# Patient Record
Sex: FEMALE | Race: WHITE | NOT HISPANIC OR LATINO | Employment: OTHER | URBAN - METROPOLITAN AREA
[De-identification: names, ages, dates, MRNs, and addresses within clinical notes are randomized per-mention and may not be internally consistent; named-entity substitution may affect disease eponyms.]

---

## 2019-04-02 ENCOUNTER — ANESTHESIA EVENT (OUTPATIENT)
Dept: SURGERY | Facility: CLINIC | Age: 54
End: 2019-04-02
Payer: COMMERCIAL

## 2019-04-03 RX ORDER — LEFLUNOMIDE 20 MG/1
20 TABLET ORAL EVERY EVENING
COMMUNITY

## 2019-04-03 RX ORDER — CHLORAL HYDRATE 500 MG
1 CAPSULE ORAL DAILY
Status: ON HOLD | COMMUNITY
End: 2021-04-13

## 2019-04-03 RX ORDER — MODAFINIL 200 MG/1
200 TABLET ORAL DAILY PRN
COMMUNITY
End: 2022-11-18

## 2019-04-03 RX ORDER — BUPROPION HYDROCHLORIDE 150 MG/1
150 TABLET ORAL EVERY MORNING
Status: ON HOLD | COMMUNITY
End: 2021-04-13

## 2019-04-03 RX ORDER — DULOXETIN HYDROCHLORIDE 60 MG/1
120 CAPSULE, DELAYED RELEASE ORAL EVERY EVENING
Status: ON HOLD | COMMUNITY
End: 2021-04-13

## 2019-04-03 RX ORDER — METFORMIN HCL 500 MG
500 TABLET, EXTENDED RELEASE 24 HR ORAL
COMMUNITY
End: 2022-11-18

## 2019-04-03 RX ORDER — ALBUTEROL SULFATE 90 UG/1
2 AEROSOL, METERED RESPIRATORY (INHALATION) EVERY 4 HOURS PRN
COMMUNITY

## 2019-04-03 RX ORDER — BUSPIRONE HYDROCHLORIDE 5 MG/1
5 TABLET ORAL DAILY PRN
Status: ON HOLD | COMMUNITY
End: 2021-04-13

## 2019-04-03 RX ORDER — ERGOCALCIFEROL 1.25 MG/1
50000 CAPSULE, LIQUID FILLED ORAL
COMMUNITY

## 2019-04-03 RX ORDER — NYSTATIN 100000 U/G
CREAM TOPICAL 2 TIMES DAILY PRN
Status: ON HOLD | COMMUNITY
End: 2022-11-30

## 2019-04-03 RX ORDER — TOPIRAMATE 50 MG/1
50 TABLET, FILM COATED ORAL 2 TIMES DAILY
COMMUNITY
End: 2021-06-25

## 2019-04-03 RX ORDER — MORPHINE SULFATE 30 MG/1
5 TABLET ORAL 4 TIMES DAILY PRN
Status: ON HOLD | COMMUNITY
End: 2019-05-09

## 2019-04-03 RX ORDER — HYDROXYZINE PAMOATE 25 MG/1
25 CAPSULE ORAL DAILY
Status: ON HOLD | COMMUNITY
End: 2019-04-04

## 2019-04-03 RX ORDER — DILTIAZEM HCL 60 MG
60 TABLET ORAL 3 TIMES DAILY
COMMUNITY

## 2019-04-03 RX ORDER — PHENTERMINE HYDROCHLORIDE 37.5 MG/1
18.75 TABLET ORAL DAILY
COMMUNITY
End: 2021-05-13

## 2019-04-03 RX ORDER — LIDOCAINE 50 MG/G
PATCH TOPICAL EVERY 12 HOURS
Status: ON HOLD | COMMUNITY
End: 2019-04-04

## 2019-04-03 RX ORDER — NYSTATIN 100000 [USP'U]/G
POWDER TOPICAL 4 TIMES DAILY PRN
COMMUNITY

## 2019-04-03 RX ORDER — METHYLPREDNISOLONE 4 MG/1
8 TABLET ORAL DAILY
COMMUNITY
End: 2022-11-18

## 2019-04-03 RX ORDER — GABAPENTIN 300 MG/1
900 CAPSULE ORAL 3 TIMES DAILY
COMMUNITY

## 2019-04-03 RX ORDER — TRAZODONE HYDROCHLORIDE 100 MG/1
100 TABLET ORAL AT BEDTIME
COMMUNITY
End: 2022-11-18

## 2019-04-03 RX ORDER — OXYCODONE HYDROCHLORIDE 15 MG/1
15 TABLET ORAL 3 TIMES DAILY PRN
Status: ON HOLD | COMMUNITY
End: 2019-04-04

## 2019-04-03 RX ORDER — BUPROPION HYDROCHLORIDE 300 MG/1
300 TABLET ORAL EVERY MORNING
COMMUNITY

## 2019-04-03 RX ORDER — VARENICLINE TARTRATE 1 MG/1
1 TABLET, FILM COATED ORAL 2 TIMES DAILY
COMMUNITY
End: 2022-11-18

## 2019-04-03 RX ORDER — CYCLOSPORINE 0.5 MG/ML
1 EMULSION OPHTHALMIC 2 TIMES DAILY
COMMUNITY

## 2019-04-03 RX ORDER — MULTIVITAMIN,THERAPEUTIC
1 TABLET ORAL DAILY
COMMUNITY

## 2019-04-03 RX ORDER — LEVOTHYROXINE SODIUM 75 UG/1
75 TABLET ORAL DAILY
COMMUNITY
End: 2022-11-18

## 2019-04-03 RX ORDER — FLUTICASONE PROPIONATE 50 MCG
2 SPRAY, SUSPENSION (ML) NASAL DAILY
COMMUNITY

## 2019-04-03 RX ORDER — MIRABEGRON 25 MG/1
25 TABLET, FILM COATED, EXTENDED RELEASE ORAL EVERY EVENING
Status: ON HOLD | COMMUNITY
End: 2021-04-13

## 2019-04-03 RX ORDER — CYCLOBENZAPRINE HCL 10 MG
10 TABLET ORAL 3 TIMES DAILY
Status: ON HOLD | COMMUNITY
End: 2021-04-13

## 2019-04-03 RX ORDER — LIDOCAINE 40 MG/G
CREAM TOPICAL DAILY PRN
Status: ON HOLD | COMMUNITY
End: 2021-04-13

## 2019-04-03 SDOH — HEALTH STABILITY: MENTAL HEALTH: HOW OFTEN DO YOU HAVE A DRINK CONTAINING ALCOHOL?: NEVER

## 2019-04-03 NOTE — OR NURSING
An undated MRSA positive culture noted on H&P on 4/3/19. Infectious disease called. ID to notify SD pre-op if negative results found otherwise ID to enter MRSA under infection section and SD pre-op to follow MRSA precautions for patient's surgery on 4/4/19.

## 2019-04-04 ENCOUNTER — ANESTHESIA (OUTPATIENT)
Dept: SURGERY | Facility: CLINIC | Age: 54
End: 2019-04-04
Payer: COMMERCIAL

## 2019-04-04 ENCOUNTER — APPOINTMENT (OUTPATIENT)
Dept: GENERAL RADIOLOGY | Facility: CLINIC | Age: 54
End: 2019-04-04
Attending: ANESTHESIOLOGY
Payer: COMMERCIAL

## 2019-04-04 ENCOUNTER — HOSPITAL ENCOUNTER (OUTPATIENT)
Facility: CLINIC | Age: 54
Discharge: HOME | End: 2019-04-04
Attending: ANESTHESIOLOGY | Admitting: ANESTHESIOLOGY
Payer: COMMERCIAL

## 2019-04-04 VITALS
OXYGEN SATURATION: 94 % | TEMPERATURE: 98.1 F | HEART RATE: 79 BPM | WEIGHT: 287.5 LBS | SYSTOLIC BLOOD PRESSURE: 121 MMHG | DIASTOLIC BLOOD PRESSURE: 81 MMHG | HEIGHT: 63 IN | BODY MASS INDEX: 50.94 KG/M2 | RESPIRATION RATE: 12 BRPM

## 2019-04-04 LAB — GLUCOSE BLDC GLUCOMTR-MCNC: 99 MG/DL (ref 70–99)

## 2019-04-04 PROCEDURE — 71000027 ZZH RECOVERY PHASE 2 EACH 15 MINS: Performed by: ANESTHESIOLOGY

## 2019-04-04 PROCEDURE — C1897 LEAD, NEUROSTIM TEST KIT: HCPCS | Performed by: ANESTHESIOLOGY

## 2019-04-04 PROCEDURE — 71000012 ZZH RECOVERY PHASE 1 LEVEL 1 FIRST HR: Performed by: ANESTHESIOLOGY

## 2019-04-04 PROCEDURE — 37000009 ZZH ANESTHESIA TECHNICAL FEE, EACH ADDTL 15 MIN: Performed by: ANESTHESIOLOGY

## 2019-04-04 PROCEDURE — 37000008 ZZH ANESTHESIA TECHNICAL FEE, 1ST 30 MIN: Performed by: ANESTHESIOLOGY

## 2019-04-04 PROCEDURE — 25000128 H RX IP 250 OP 636: Performed by: ANESTHESIOLOGY

## 2019-04-04 PROCEDURE — 82962 GLUCOSE BLOOD TEST: CPT

## 2019-04-04 PROCEDURE — 27210794 ZZH OR GENERAL SUPPLY STERILE: Performed by: ANESTHESIOLOGY

## 2019-04-04 PROCEDURE — 25800030 ZZH RX IP 258 OP 636: Performed by: NURSE ANESTHETIST, CERTIFIED REGISTERED

## 2019-04-04 PROCEDURE — 27211024 ZZHC OR SUPPLY OTHER OPNP: Performed by: ANESTHESIOLOGY

## 2019-04-04 PROCEDURE — 25000125 ZZHC RX 250: Performed by: NURSE ANESTHETIST, CERTIFIED REGISTERED

## 2019-04-04 PROCEDURE — 25000125 ZZHC RX 250: Performed by: ANESTHESIOLOGY

## 2019-04-04 PROCEDURE — 71000013 ZZH RECOVERY PHASE 1 LEVEL 1 EA ADDTL HR: Performed by: ANESTHESIOLOGY

## 2019-04-04 PROCEDURE — 36000063 ZZH SURGERY LEVEL 4 EA 15 ADDTL MIN: Performed by: ANESTHESIOLOGY

## 2019-04-04 PROCEDURE — 40000277 XR SURGERY CARM FLUORO LESS THAN 5 MIN W STILLS

## 2019-04-04 PROCEDURE — 25000128 H RX IP 250 OP 636: Performed by: NURSE ANESTHETIST, CERTIFIED REGISTERED

## 2019-04-04 PROCEDURE — 40000169 ZZH STATISTIC PRE-PROCEDURE ASSESSMENT I: Performed by: ANESTHESIOLOGY

## 2019-04-04 PROCEDURE — 36000065 ZZH SURGERY LEVEL 4 W FLUORO 1ST 30 MIN: Performed by: ANESTHESIOLOGY

## 2019-04-04 DEVICE — LEAD KIT VECTRIS TRIAL COMPACT SURESCAN 1X8MMX90CM 977D260: Type: IMPLANTABLE DEVICE | Site: SPINE LUMBAR | Status: FUNCTIONAL

## 2019-04-04 RX ORDER — ONDANSETRON 2 MG/ML
4 INJECTION INTRAMUSCULAR; INTRAVENOUS EVERY 30 MIN PRN
Status: DISCONTINUED | OUTPATIENT
Start: 2019-04-04 | End: 2019-04-04 | Stop reason: HOSPADM

## 2019-04-04 RX ORDER — BUPIVACAINE HYDROCHLORIDE 2.5 MG/ML
INJECTION, SOLUTION EPIDURAL; INFILTRATION; INTRACAUDAL
Status: DISCONTINUED
Start: 2019-04-04 | End: 2019-04-04 | Stop reason: HOSPADM

## 2019-04-04 RX ORDER — ONDANSETRON 2 MG/ML
INJECTION INTRAMUSCULAR; INTRAVENOUS PRN
Status: DISCONTINUED | OUTPATIENT
Start: 2019-04-04 | End: 2019-04-04

## 2019-04-04 RX ORDER — FENTANYL CITRATE 50 UG/ML
25-50 INJECTION, SOLUTION INTRAMUSCULAR; INTRAVENOUS
Status: DISCONTINUED | OUTPATIENT
Start: 2019-04-04 | End: 2019-04-04 | Stop reason: HOSPADM

## 2019-04-04 RX ORDER — LABETALOL HYDROCHLORIDE 5 MG/ML
10 INJECTION, SOLUTION INTRAVENOUS
Status: DISCONTINUED | OUTPATIENT
Start: 2019-04-04 | End: 2019-04-04 | Stop reason: HOSPADM

## 2019-04-04 RX ORDER — CLINDAMYCIN PHOSPHATE 900 MG/50ML
900 INJECTION, SOLUTION INTRAVENOUS
Status: COMPLETED | OUTPATIENT
Start: 2019-04-04 | End: 2019-04-04

## 2019-04-04 RX ORDER — DEXTROSE MONOHYDRATE 25 G/50ML
25-50 INJECTION, SOLUTION INTRAVENOUS
Status: DISCONTINUED | OUTPATIENT
Start: 2019-04-04 | End: 2019-04-04 | Stop reason: HOSPADM

## 2019-04-04 RX ORDER — HYDRALAZINE HYDROCHLORIDE 20 MG/ML
2.5-5 INJECTION INTRAMUSCULAR; INTRAVENOUS EVERY 10 MIN PRN
Status: DISCONTINUED | OUTPATIENT
Start: 2019-04-04 | End: 2019-04-04 | Stop reason: HOSPADM

## 2019-04-04 RX ORDER — DEXAMETHASONE SODIUM PHOSPHATE 4 MG/ML
4 INJECTION, SOLUTION INTRA-ARTICULAR; INTRALESIONAL; INTRAMUSCULAR; INTRAVENOUS; SOFT TISSUE EVERY 10 MIN PRN
Status: DISCONTINUED | OUTPATIENT
Start: 2019-04-04 | End: 2019-04-04 | Stop reason: HOSPADM

## 2019-04-04 RX ORDER — FENTANYL CITRATE 50 UG/ML
INJECTION, SOLUTION INTRAMUSCULAR; INTRAVENOUS PRN
Status: DISCONTINUED | OUTPATIENT
Start: 2019-04-04 | End: 2019-04-04

## 2019-04-04 RX ORDER — ONDANSETRON 4 MG/1
4 TABLET, ORALLY DISINTEGRATING ORAL EVERY 30 MIN PRN
Status: DISCONTINUED | OUTPATIENT
Start: 2019-04-04 | End: 2019-04-04 | Stop reason: HOSPADM

## 2019-04-04 RX ORDER — OXYCODONE HYDROCHLORIDE 15 MG/1
15 TABLET, FILM COATED, EXTENDED RELEASE ORAL 3 TIMES DAILY
COMMUNITY
End: 2021-05-13

## 2019-04-04 RX ORDER — KETOROLAC TROMETHAMINE 30 MG/ML
30 INJECTION, SOLUTION INTRAMUSCULAR; INTRAVENOUS EVERY 6 HOURS PRN
Status: DISCONTINUED | OUTPATIENT
Start: 2019-04-04 | End: 2019-04-04 | Stop reason: HOSPADM

## 2019-04-04 RX ORDER — BUPIVACAINE HYDROCHLORIDE 2.5 MG/ML
INJECTION, SOLUTION INFILTRATION; PERINEURAL PRN
Status: DISCONTINUED | OUTPATIENT
Start: 2019-04-04 | End: 2019-04-04 | Stop reason: HOSPADM

## 2019-04-04 RX ORDER — HYDROMORPHONE HYDROCHLORIDE 1 MG/ML
.3-.5 INJECTION, SOLUTION INTRAMUSCULAR; INTRAVENOUS; SUBCUTANEOUS EVERY 10 MIN PRN
Status: DISCONTINUED | OUTPATIENT
Start: 2019-04-04 | End: 2019-04-04 | Stop reason: HOSPADM

## 2019-04-04 RX ORDER — SODIUM CHLORIDE, SODIUM LACTATE, POTASSIUM CHLORIDE, CALCIUM CHLORIDE 600; 310; 30; 20 MG/100ML; MG/100ML; MG/100ML; MG/100ML
INJECTION, SOLUTION INTRAVENOUS CONTINUOUS PRN
Status: DISCONTINUED | OUTPATIENT
Start: 2019-04-04 | End: 2019-04-04

## 2019-04-04 RX ORDER — PROPOFOL 10 MG/ML
INJECTION, EMULSION INTRAVENOUS CONTINUOUS PRN
Status: DISCONTINUED | OUTPATIENT
Start: 2019-04-04 | End: 2019-04-04

## 2019-04-04 RX ORDER — LIDOCAINE 40 MG/G
CREAM TOPICAL
Status: DISCONTINUED | OUTPATIENT
Start: 2019-04-04 | End: 2019-04-04 | Stop reason: HOSPADM

## 2019-04-04 RX ORDER — NALOXONE HYDROCHLORIDE 0.4 MG/ML
.1-.4 INJECTION, SOLUTION INTRAMUSCULAR; INTRAVENOUS; SUBCUTANEOUS
Status: DISCONTINUED | OUTPATIENT
Start: 2019-04-04 | End: 2019-04-04 | Stop reason: HOSPADM

## 2019-04-04 RX ORDER — NICOTINE POLACRILEX 4 MG
15-30 LOZENGE BUCCAL
Status: DISCONTINUED | OUTPATIENT
Start: 2019-04-04 | End: 2019-04-04 | Stop reason: HOSPADM

## 2019-04-04 RX ORDER — DEXAMETHASONE SODIUM PHOSPHATE 4 MG/ML
INJECTION, SOLUTION INTRA-ARTICULAR; INTRALESIONAL; INTRAMUSCULAR; INTRAVENOUS; SOFT TISSUE PRN
Status: DISCONTINUED | OUTPATIENT
Start: 2019-04-04 | End: 2019-04-04

## 2019-04-04 RX ORDER — SODIUM CHLORIDE, SODIUM LACTATE, POTASSIUM CHLORIDE, CALCIUM CHLORIDE 600; 310; 30; 20 MG/100ML; MG/100ML; MG/100ML; MG/100ML
INJECTION, SOLUTION INTRAVENOUS CONTINUOUS
Status: DISCONTINUED | OUTPATIENT
Start: 2019-04-04 | End: 2019-04-04 | Stop reason: HOSPADM

## 2019-04-04 RX ORDER — LIDOCAINE HYDROCHLORIDE 20 MG/ML
INJECTION, SOLUTION INFILTRATION; PERINEURAL PRN
Status: DISCONTINUED | OUTPATIENT
Start: 2019-04-04 | End: 2019-04-04

## 2019-04-04 RX ORDER — MAGNESIUM HYDROXIDE 1200 MG/15ML
LIQUID ORAL PRN
Status: DISCONTINUED | OUTPATIENT
Start: 2019-04-04 | End: 2019-04-04 | Stop reason: HOSPADM

## 2019-04-04 RX ORDER — MEPERIDINE HYDROCHLORIDE 25 MG/ML
12.5 INJECTION INTRAMUSCULAR; INTRAVENOUS; SUBCUTANEOUS
Status: DISCONTINUED | OUTPATIENT
Start: 2019-04-04 | End: 2019-04-04 | Stop reason: HOSPADM

## 2019-04-04 RX ADMIN — DEXMEDETOMIDINE HYDROCHLORIDE 8 MCG: 100 INJECTION, SOLUTION INTRAVENOUS at 07:47

## 2019-04-04 RX ADMIN — FENTANYL CITRATE 50 MCG: 50 INJECTION, SOLUTION INTRAMUSCULAR; INTRAVENOUS at 07:44

## 2019-04-04 RX ADMIN — MIDAZOLAM 2 MG: 1 INJECTION INTRAMUSCULAR; INTRAVENOUS at 07:37

## 2019-04-04 RX ADMIN — SODIUM CHLORIDE, POTASSIUM CHLORIDE, SODIUM LACTATE AND CALCIUM CHLORIDE: 600; 310; 30; 20 INJECTION, SOLUTION INTRAVENOUS at 07:37

## 2019-04-04 RX ADMIN — ONDANSETRON 4 MG: 2 INJECTION INTRAMUSCULAR; INTRAVENOUS at 08:20

## 2019-04-04 RX ADMIN — DEXAMETHASONE SODIUM PHOSPHATE 4 MG: 4 INJECTION, SOLUTION INTRA-ARTICULAR; INTRALESIONAL; INTRAMUSCULAR; INTRAVENOUS; SOFT TISSUE at 07:44

## 2019-04-04 RX ADMIN — PROPOFOL 100 MCG/KG/MIN: 10 INJECTION, EMULSION INTRAVENOUS at 07:44

## 2019-04-04 RX ADMIN — CLINDAMYCIN PHOSPHATE 900 MG: 18 INJECTION, SOLUTION INTRAVENOUS at 07:44

## 2019-04-04 RX ADMIN — LIDOCAINE HYDROCHLORIDE 40 MG: 20 INJECTION, SOLUTION INFILTRATION; PERINEURAL at 07:44

## 2019-04-04 ASSESSMENT — COPD QUESTIONNAIRES: COPD: 1

## 2019-04-04 ASSESSMENT — MIFFLIN-ST. JEOR: SCORE: 1878.22

## 2019-04-04 NOTE — PROGRESS NOTES
Admission medication history interview status for the 4/4/2019  admission is complete. See EPIC admission navigator for prior to admission medications     Medication history source reliability:Good    Medication history interview source(s):Patient    Medication history resources (including written lists, pill bottles, clinic record):None    Primary pharmacy.[facility]    Additional medication history information not noted on PTA med list :None    Time spent in this activity: 45 minutes    Prior to Admission medications    Medication Sig Last Dose Taking? Auth Provider   albuterol (PROAIR HFA/PROVENTIL HFA/VENTOLIN HFA) 108 (90 Base) MCG/ACT inhaler Inhale 2-4 puffs into the lungs every 4 hours as needed for shortness of breath / dyspnea or wheezing more than a week at prn Yes Reported, Patient   Artificial Tear Solution (SOOTHE HYDRATION OP) Apply 1 drop to eye daily 4/3/2019 at Unknown time Yes Reported, Patient   buPROPion (WELLBUTRIN XL) 150 MG 24 hr tablet Take 150 mg by mouth every morning (Takes with 300mg tablet for total dose of 450mg) 4/3/2019 at am Yes Reported, Patient   buPROPion (WELLBUTRIN XL) 300 MG 24 hr tablet Take 300 mg by mouth every morning (Take with 150 mg for a total of 450 mg daily) 4/3/2019 at am Yes Reported, Patient   busPIRone (BUSPAR) 5 MG tablet Take 5 mg by mouth 3 times daily as needed  more than a week at prn Yes Reported, Patient   cyclobenzaprine (FLEXERIL) 10 MG tablet Take 10 mg by mouth 3 times daily  4/4/2019 at 0500 Yes Reported, Patient   cycloSPORINE (RESTASIS) 0.05 % ophthalmic emulsion Place 1 drop into both eyes 2 times daily 4/4/2019 at 0500 Yes Reported, Patient   diltiazem (CARDIZEM) 60 MG tablet Take 60 mg by mouth 3 times daily 4/4/2019 at 0500 Yes Reported, Patient   DULoxetine (CYMBALTA) 60 MG capsule Take 120 mg by mouth every evening (takes 2 x 60mg = 120mg daily) 4/3/2019 at pm Yes Reported, Patient   fish oil-omega-3 fatty acids 1000  MG capsule Take 1 g by mouth daily 3/21/2019 Yes Reported, Patient   fluticasone (FLONASE) 50 MCG/ACT nasal spray Spray 2 sprays into both nostrils daily 4/3/2019 at Unknown time Yes Reported, Patient   Fluticasone-Salmeterol (AIRDUO RESPICLICK 113/14 IN) Inhale 1 puff into the lungs daily  4/3/2019 at Unknown time Yes Reported, Patient   gabapentin (NEURONTIN) 300 MG capsule Take 900 mg by mouth 3 times daily  4/3/2019 at pm Yes Reported, Patient   leflunomide (ARAVA) 20 MG tablet Take 20 mg by mouth daily 4/3/2019 at am Yes Reported, Patient   levothyroxine (SYNTHROID/LEVOTHROID) 75 MCG tablet Take 75 mcg by mouth daily 4/3/2019 at am Yes Reported, Patient   lidocaine (LMX4) 4 % external cream Apply topically daily as needed for mild pain  4/3/2019 at am Yes Reported, Patient   metFORMIN (GLUCOPHAGE-XR) 500 MG 24 hr tablet Take 1,000 mg by mouth daily 4/3/2019 at am Yes Reported, Patient   methylPREDNISolone (MEDROL) 4 MG tablet Take 4 mg by mouth daily 4/3/2019 at am Yes Reported, Patient   mirabegron (MYRBETRIQ) 25 MG 24 hr tablet Take 25 mg by mouth every evening  4/3/2019 at pm Yes Reported, Patient   modafinil (PROVIGIL) 200 MG tablet Take 200 mg by mouth every morning 4/3/2019 at am Yes Reported, Patient   morphine 5 MG solu-tab Take 5 mg by mouth 4 times daily as needed  4/4/2019 at 0500 Yes Reported, Patient   multivitamin, therapeutic (THERA-VIT) TABS tablet Take 1 tablet by mouth daily 4/3/2019 at am Yes Reported, Patient   naloxone (NARCAN) 4 MG/0.1ML nasal spray Spray 4 mg into one nostril alternating nostrils once as needed for opioid reversal every 2-3 minutes until assistance arrives unknown at prn Yes Reported, Patient   nystatin (MYCOSTATIN) 109171 UNIT/GM external cream Apply topically 3 times daily as needed for dry skin more than a week at prn Yes Reported, Patient   nystatin (MYCOSTATIN) 671328 UNIT/GM external powder Apply topically daily as needed Under abdominal wall  more than a week at  prn Yes Reported, Patient   omeprazole (PRILOSEC) 20 MG DR capsule Take 20 mg by mouth 2 times daily  4/3/2019 at pm Yes Reported, Patient   oxyCODONE (OXYCONTIN) 15 MG 12 hr tablet Take 15 mg by mouth 3 times daily 4/4/2019 at 0500 Yes Reported, Patient   phentermine (ADIPEX-P) 37.5 MG tablet Take 0.5 tablets by mouth daily 4/3/2019 at am Yes Reported, Patient   secukinumab (COSENTYX) 150 MG/ML Sensoready pen Inject 300 mg Subcutaneous every 28 days March 2019 Yes Reported, Patient   topiramate (TOPAMAX) 50 MG tablet Take 50 mg by mouth 2 times daily 4/3/2019 at pm Yes Reported, Patient   traZODone (DESYREL) 100 MG tablet Take 100 mg by mouth At Bedtime  4/2/2019 Yes Reported, Patient   varenicline (CHANTIX) 1 MG tablet Take 1 mg by mouth 2 times daily 4/3/2019 at pm Yes Reported, Patient   vitamin D2 (ERGOCALCIFEROL) 34007 units (1250 mcg) capsule Take 50,000 Units by mouth four times a week  4/3/2019 at Unknown time Yes Reported, Patient

## 2019-04-04 NOTE — ANESTHESIA CARE TRANSFER NOTE
Patient: Swathi Gibbons    Procedure(s):  SPINAL CORD STIMULATOR  TRIAL    Diagnosis: POST LAMINECTOMY SYNDROME  Diagnosis Additional Information: No value filed.    Anesthesia Type:   MAC     Note:  Airway :Nasal Cannula  Patient transferred to:PACU  Comments: At end of procedure, spontaneous respirations, patient alert to voice, able to follow commands. Oxygen via nasal cannula at 2 liters per minute to PACU. Oxygen tubing connected to wall O2 in PACU, SpO2, NiBP, and EKG monitors and alarms on and functioning, Hiwot Hugger warmer connected to patient gown, report on patient's clinical status given to PACU RN, RN questions answered.Handoff Report: Identifed the Patient, Identified the Reponsible Provider, Reviewed the pertinent medical history, Discussed the surgical course, Reviewed Intra-OP anesthesia mangement and issues during anesthesia, Set expectations for post-procedure period and Allowed opportunity for questions and acknowledgement of understanding      Vitals: (Last set prior to Anesthesia Care Transfer)    CRNA VITALS  4/4/2019 0802 - 4/4/2019 0836      4/4/2019             Resp Rate (set):  10                Electronically Signed By: JIMMIE Edwards CRNA  April 4, 2019  8:36 AM

## 2019-04-04 NOTE — OR NURSING
p's 02 sats decreased, Dr Fallon aware, pt using incentive spirometer well, pt will use cpap at home. Okay to move to stage 2.

## 2019-04-04 NOTE — DISCHARGE INSTRUCTIONS
Same Day Surgery Discharge Instructions for  Sedation and General Anesthesia       It's not unusual to feel dizzy, light-headed or faint for up to 24 hours after surgery or while taking pain medication.  If you have these symptoms: sit for a few minutes before standing and have someone assist you when you get up to walk or use the bathroom.      You should rest and relax for the next 24 hours. We recommend you make arrangements to have an adult stay with you for at least 24 hours after your discharge.  Avoid hazardous and strenuous activity.      DO NOT DRIVE any vehicle or operate mechanical equipment for 24 hours following the end of your surgery.  Even though you may feel normal, your reactions may be affected by the medication you have received.      Do not drink alcoholic beverages for 24 hours following surgery.       Slowly progress to your regular diet as you feel able. It's not unusual to feel nauseated and/or vomit after receiving anesthesia.  If you develop these symptoms, drink clear liquids (apple juice, ginger ale, broth, 7-up, etc. ) until you feel better.  If your nausea and vomiting persists for 24 hours, please notify your surgeon.        All narcotic pain medications, along with inactivity and anesthesia, can cause constipation. Drinking plenty of liquids and increasing fiber intake will help.      For any questions of a medical nature, call your surgeon.      Do not make important decisions for 24 hours.      If you had general anesthesia, you may have a sore throat for a couple of days related to the breathing tube used during surgery.  You may use Cepacol lozenges to help with this discomfort.  If it worsens or if you develop a fever, contact your surgeon.       If you feel your pain is not well managed with the pain medications prescribed by your surgeon, please contact your surgeon's office to let them know so they can address your concerns.       Spinal Cord Stimulator Trial Discharge  Instructions    After your trial procedure:      Resume light activity as tolerated    Restrict bending, lifting and twisting to reduce the chance of your leads moving.      Resume your regular pain medication regimen    Begin taking your oral antibiotic the day after your trial procedure.  It is important you finish the entire course of medication regimen    No driving with the stimulator on (Nevro brand--exempt)    A small amount of blood visible in the bandage is keily;, if any leaks out of the bandage contact [facility].    Site Care:      DO NOT remove any bandages from the trial.  A RN or your provider will assess your dressing at your mid-trial appointment and will remove the dressing at your lead pull appointment.     NO showering, baths, pools or whirlpools during the trial    Bandage needs to stay dry and intact.    After the bandage is removed, check your insertion sites daily.  Keep the area clean and dry.    Call [facility] during business hours or the on-call provider if you develop any of the following:      Signs of infection such as: fevers, chills, increased pain, drainage (as mentioned above), redness and swelling from your incision sites.    Headaches persisting for more than 48 hours.      Unusual changes in or stopping of sensation in your legs or back.      Uncontrolled pain.    Signs or symptoms of a deep vein thrombosis (DVT): swelling in one or both legs, pain or tenderness in one or both legs, warm skin on your leg, and/or red or discolored skin on your leg.    [facility]: [phone]     After hours provider: [phone]    Emergency Situations go to the Emergency Department or Call 911:      Sudden sever back pain.    Sudden onset of leg weakness and spasms.    Loss of bladder control and/or bowel function.    Signs or symptoms of a pulmonary embolism(PE): sudden coughing--which may bring up blood sharp chest pain, rapid breathing or shortness of  breath, and/or severe lightheadedness.        **Because you had anesthesia today and your history of sleep apnea, it is extremely important that you use your CPAP machine for the next 24 hours while napping or sleeping.**

## 2019-04-04 NOTE — ANESTHESIA POSTPROCEDURE EVALUATION
Patient: Swathi Gibbons    Procedure(s):  SPINAL CORD STIMULATOR  TRIAL    Diagnosis:POST LAMINECTOMY SYNDROME  Diagnosis Additional Information: No value filed.    Anesthesia Type:  MAC    Note:  Anesthesia Post Evaluation    Patient location during evaluation: PACU  Patient participation: Able to fully participate in evaluation  Level of consciousness: awake and alert  Pain management: adequate  Airway patency: patent  Cardiovascular status: acceptable  Respiratory status: acceptable and unassisted  Hydration status: acceptable  PONV: none             Last vitals:  Vitals:    04/04/19 0915 04/04/19 0930 04/04/19 1016   BP: 103/83  121/81   Pulse: 79     Resp: 12  12   Temp: 36.5  C (97.7  F)  36.7  C (98.1  F)   SpO2: 93% 94% 94%         Electronically Signed By: Gabbie Fallon MD  April 4, 2019  10:25 AM

## 2019-04-12 NOTE — OP NOTE
"  Name:    Swathi Gibbons   :    1965  Location:   [facility]  Procedure date: 2019 11:25 AM  Procedure:    [employer] Spinal Cord Stimulator Trial  Indication:  Postlaminectomy syndrome, not elsewhere classified M96.1      Allergies:  Ingredient Reaction (Severity) Medication Name Comment   MOXIFLOXACIN HCL  Avelox    SULFAMETHOXAZOLE  Septra    TRIMETHOPRIM  Septra      Lead lot numbers: [id number] and [id number].    This procedure was performed using MAC sedation due to the patient's anxiety.    Description of Procedure: IV access was obtained via a registered nurse and 1 g ANCEF mixed in 50ml of normal saline was administered through the IV over 10 minutes. During this time, a [employer] representative met with the patient to discuss where the patient's pain was located in relation to where they hoped to receive spinal cord stimulation.     The patient was brought into the procedure room and positioned prone on the fluoroscopy table with four pillows under their abdomen and prepped with Duraprep surgical solution and draped in the usual sterile fashion. The registered nurse initiated a verbal timeout or \"pause for the cause\" stating the patient's name, date of birth, and procedure to be performed. Reginald Pandey MD  then repeated back the procedure and the patient's name in confirmation.     MAC sedation was administered in increments throughout the procedure which resulted in satisfactory relaxation and sedation. Normal Saline ran through the IV over the course of the procedure. The patient was monitored throughout the procedure by a CRNA. Physiologic parameters were observed utilizing pulse oximetery and blood pressure checks every five minutes during the procedure. Monitoring revealed normal oxygen saturation and pulse rate.    The patient's skin was anesthetized at the appropriate level and a 14 gauge Tuohy needle was introduced through the skin on the left side. Fluoroscopy " was used to identify correct placement. The neurostimulator kit manufactured by [employer] was used. The needle was advanced up to the T11-T12 lamina then advanced further through the T11-T12 interlaminar space. The epidural space was penetrated as a loss of resistance was felt with an air filled syringe. The stylet was then removed and a spinal cord stimulator lead with 8 contacts was introduced. The lead was advanced without difficulty under fluoroscopy until the top of the lead was at the top T8 vertebral level with three contacts at T8. This lead was left of midline. A lateral fluoroscopy view confirmed posterior epidural placement.     A second 14 gauge Tuohy needle was introduced through the skin on the right side. Fluoroscopy was used to identify correct placement. The neurostimulator kit manufactured by [employer] was used. The needle was advanced up to the T11-T12 lamina and then advanced further through the T11-T12 interlaminar space. The epidural space was penetrated as a loss of resistance was felt with an air filled syringe. The stylet was then removed and a spinal cord stimulator lead with 8 contacts was introduced. The lead was advanced without difficulty under fluoroscopy until the top of the lead was at the top T8 vertebral level with three contacts at T8. This lead was right of midline. A lateral fluoroscopy view confirmed posterior epidural placement.     The needles were removed and the leads were secured to the skin with steri strips. The insertion sites were then covered with a Tegaderm dressing. The leads were connected to the external pulse generator.     The patient was brought back to the recovery area where they were monitored by a registered nurse. Physiologic parameters were observed utilizing pulse oximetery and blood pressure checks every ten minutes in recovery. Monitoring revealed normal oxygen saturation and pulse rate. The patient tolerated the procedure well and no immediate  complications were encountered. Further reprogramming was performed in the recovery area. The patient was shown how to use the neurostimulator controls and will follow up in our office in two days for re-evaluation. An antibiotic was prescribed as a prophylaxis and the patient will take this for the next 10 days.       Reginald Pandey MD   04/05/2019 11:25 AM

## 2019-05-08 RX ORDER — NICOTINE POLACRILEX 4 MG
15-30 LOZENGE BUCCAL
Status: CANCELLED | OUTPATIENT
Start: 2019-05-08

## 2019-05-08 RX ORDER — DEXTROSE MONOHYDRATE 25 G/50ML
25-50 INJECTION, SOLUTION INTRAVENOUS
Status: CANCELLED | OUTPATIENT
Start: 2019-05-08

## 2019-05-08 RX ORDER — CLINDAMYCIN PHOSPHATE 900 MG/50ML
900 INJECTION, SOLUTION INTRAVENOUS
Status: CANCELLED | OUTPATIENT
Start: 2019-05-08

## 2019-05-08 RX ORDER — LIDOCAINE 40 MG/G
CREAM TOPICAL
Status: CANCELLED | OUTPATIENT
Start: 2019-05-08

## 2019-05-09 ENCOUNTER — ANESTHESIA EVENT (OUTPATIENT)
Dept: SURGERY | Facility: CLINIC | Age: 54
End: 2019-05-09
Payer: COMMERCIAL

## 2019-05-09 ENCOUNTER — ANESTHESIA (OUTPATIENT)
Dept: SURGERY | Facility: CLINIC | Age: 54
End: 2019-05-09
Payer: COMMERCIAL

## 2019-05-09 ENCOUNTER — APPOINTMENT (OUTPATIENT)
Dept: GENERAL RADIOLOGY | Facility: CLINIC | Age: 54
End: 2019-05-09
Attending: ANESTHESIOLOGY
Payer: COMMERCIAL

## 2019-05-09 ENCOUNTER — HOSPITAL ENCOUNTER (OUTPATIENT)
Facility: CLINIC | Age: 54
Discharge: HOME | End: 2019-05-09
Attending: ANESTHESIOLOGY | Admitting: ANESTHESIOLOGY
Payer: COMMERCIAL

## 2019-05-09 VITALS
DIASTOLIC BLOOD PRESSURE: 82 MMHG | RESPIRATION RATE: 16 BRPM | HEART RATE: 84 BPM | TEMPERATURE: 97.5 F | WEIGHT: 278.6 LBS | SYSTOLIC BLOOD PRESSURE: 127 MMHG | BODY MASS INDEX: 49.36 KG/M2 | HEIGHT: 63 IN | OXYGEN SATURATION: 95 %

## 2019-05-09 PROCEDURE — 71000014 ZZH RECOVERY PHASE 1 LEVEL 2 FIRST HR: Performed by: ANESTHESIOLOGY

## 2019-05-09 PROCEDURE — C1787 PATIENT PROGR, NEUROSTIM: HCPCS | Performed by: ANESTHESIOLOGY

## 2019-05-09 PROCEDURE — 25800030 ZZH RX IP 258 OP 636: Performed by: NURSE ANESTHETIST, CERTIFIED REGISTERED

## 2019-05-09 PROCEDURE — 25000128 H RX IP 250 OP 636: Performed by: NURSE ANESTHETIST, CERTIFIED REGISTERED

## 2019-05-09 PROCEDURE — 36000065 ZZH SURGERY LEVEL 4 W FLUORO 1ST 30 MIN: Performed by: ANESTHESIOLOGY

## 2019-05-09 PROCEDURE — C1778 LEAD, NEUROSTIMULATOR: HCPCS | Performed by: ANESTHESIOLOGY

## 2019-05-09 PROCEDURE — 40000279 XR SURGERY CARM FLUORO GREATER THAN 5 MIN W STILLS

## 2019-05-09 PROCEDURE — 71000015 ZZH RECOVERY PHASE 1 LEVEL 2 EA ADDTL HR: Performed by: ANESTHESIOLOGY

## 2019-05-09 PROCEDURE — 36000063 ZZH SURGERY LEVEL 4 EA 15 ADDTL MIN: Performed by: ANESTHESIOLOGY

## 2019-05-09 PROCEDURE — 71000027 ZZH RECOVERY PHASE 2 EACH 15 MINS: Performed by: ANESTHESIOLOGY

## 2019-05-09 PROCEDURE — 25800030 ZZH RX IP 258 OP 636: Performed by: ANESTHESIOLOGY

## 2019-05-09 PROCEDURE — 25000132 ZZH RX MED GY IP 250 OP 250 PS 637: Performed by: ANESTHESIOLOGY

## 2019-05-09 PROCEDURE — 40000170 ZZH STATISTIC PRE-PROCEDURE ASSESSMENT II: Performed by: ANESTHESIOLOGY

## 2019-05-09 PROCEDURE — 37000008 ZZH ANESTHESIA TECHNICAL FEE, 1ST 30 MIN: Performed by: ANESTHESIOLOGY

## 2019-05-09 PROCEDURE — C1820 GENERATOR NEURO RECHG BAT SY: HCPCS | Performed by: ANESTHESIOLOGY

## 2019-05-09 PROCEDURE — 27210794 ZZH OR GENERAL SUPPLY STERILE: Performed by: ANESTHESIOLOGY

## 2019-05-09 PROCEDURE — 37000009 ZZH ANESTHESIA TECHNICAL FEE, EACH ADDTL 15 MIN: Performed by: ANESTHESIOLOGY

## 2019-05-09 PROCEDURE — 25000125 ZZHC RX 250: Performed by: NURSE ANESTHETIST, CERTIFIED REGISTERED

## 2019-05-09 PROCEDURE — 25000125 ZZHC RX 250: Performed by: ANESTHESIOLOGY

## 2019-05-09 DEVICE — IMP LEAD KIT VECTRIS SCS COMPACT SURESCAN 1X8MMX60CM 977A260: Type: IMPLANTABLE DEVICE | Site: SPINE THORACIC | Status: FUNCTIONAL

## 2019-05-09 DEVICE — GENERATOR NEUROSTIM INTELLIS MRI SURESCAN 97715: Type: IMPLANTABLE DEVICE | Site: SPINE THORACIC | Status: FUNCTIONAL

## 2019-05-09 RX ORDER — KETOROLAC TROMETHAMINE 30 MG/ML
30 INJECTION, SOLUTION INTRAMUSCULAR; INTRAVENOUS EVERY 6 HOURS PRN
Status: DISCONTINUED | OUTPATIENT
Start: 2019-05-09 | End: 2019-05-09 | Stop reason: HOSPADM

## 2019-05-09 RX ORDER — PROPOFOL 10 MG/ML
INJECTION, EMULSION INTRAVENOUS CONTINUOUS PRN
Status: DISCONTINUED | OUTPATIENT
Start: 2019-05-09 | End: 2019-05-09

## 2019-05-09 RX ORDER — LIDOCAINE HYDROCHLORIDE 20 MG/ML
INJECTION, SOLUTION INFILTRATION; PERINEURAL PRN
Status: DISCONTINUED | OUTPATIENT
Start: 2019-05-09 | End: 2019-05-09

## 2019-05-09 RX ORDER — MORPHINE SULFATE 15 MG/1
15-30 TABLET ORAL 2 TIMES DAILY PRN
COMMUNITY
End: 2021-06-25

## 2019-05-09 RX ORDER — ONDANSETRON 2 MG/ML
INJECTION INTRAMUSCULAR; INTRAVENOUS PRN
Status: DISCONTINUED | OUTPATIENT
Start: 2019-05-09 | End: 2019-05-09

## 2019-05-09 RX ORDER — MAGNESIUM HYDROXIDE 1200 MG/15ML
LIQUID ORAL PRN
Status: DISCONTINUED | OUTPATIENT
Start: 2019-05-09 | End: 2019-05-09 | Stop reason: HOSPADM

## 2019-05-09 RX ORDER — DEXAMETHASONE SODIUM PHOSPHATE 4 MG/ML
4 INJECTION, SOLUTION INTRA-ARTICULAR; INTRALESIONAL; INTRAMUSCULAR; INTRAVENOUS; SOFT TISSUE EVERY 10 MIN PRN
Status: DISCONTINUED | OUTPATIENT
Start: 2019-05-09 | End: 2019-05-09 | Stop reason: HOSPADM

## 2019-05-09 RX ORDER — HYDRALAZINE HYDROCHLORIDE 20 MG/ML
2.5-5 INJECTION INTRAMUSCULAR; INTRAVENOUS EVERY 10 MIN PRN
Status: DISCONTINUED | OUTPATIENT
Start: 2019-05-09 | End: 2019-05-09 | Stop reason: HOSPADM

## 2019-05-09 RX ORDER — FENTANYL CITRATE 50 UG/ML
INJECTION, SOLUTION INTRAMUSCULAR; INTRAVENOUS PRN
Status: DISCONTINUED | OUTPATIENT
Start: 2019-05-09 | End: 2019-05-09

## 2019-05-09 RX ORDER — MEPERIDINE HYDROCHLORIDE 25 MG/ML
12.5 INJECTION INTRAMUSCULAR; INTRAVENOUS; SUBCUTANEOUS
Status: DISCONTINUED | OUTPATIENT
Start: 2019-05-09 | End: 2019-05-09 | Stop reason: HOSPADM

## 2019-05-09 RX ORDER — HYDROMORPHONE HYDROCHLORIDE 1 MG/ML
.3-.5 INJECTION, SOLUTION INTRAMUSCULAR; INTRAVENOUS; SUBCUTANEOUS EVERY 10 MIN PRN
Status: DISCONTINUED | OUTPATIENT
Start: 2019-05-09 | End: 2019-05-09 | Stop reason: HOSPADM

## 2019-05-09 RX ORDER — KETAMINE HYDROCHLORIDE 10 MG/ML
INJECTION, SOLUTION INTRAMUSCULAR; INTRAVENOUS PRN
Status: DISCONTINUED | OUTPATIENT
Start: 2019-05-09 | End: 2019-05-09

## 2019-05-09 RX ORDER — SODIUM CHLORIDE, SODIUM LACTATE, POTASSIUM CHLORIDE, CALCIUM CHLORIDE 600; 310; 30; 20 MG/100ML; MG/100ML; MG/100ML; MG/100ML
INJECTION, SOLUTION INTRAVENOUS CONTINUOUS
Status: DISCONTINUED | OUTPATIENT
Start: 2019-05-09 | End: 2019-05-09 | Stop reason: HOSPADM

## 2019-05-09 RX ORDER — CLINDAMYCIN PHOSPHATE 900 MG/50ML
INJECTION, SOLUTION INTRAVENOUS PRN
Status: DISCONTINUED | OUTPATIENT
Start: 2019-05-09 | End: 2019-05-09

## 2019-05-09 RX ORDER — NALOXONE HYDROCHLORIDE 0.4 MG/ML
.1-.4 INJECTION, SOLUTION INTRAMUSCULAR; INTRAVENOUS; SUBCUTANEOUS
Status: DISCONTINUED | OUTPATIENT
Start: 2019-05-09 | End: 2019-05-09 | Stop reason: HOSPADM

## 2019-05-09 RX ORDER — ONDANSETRON 2 MG/ML
4 INJECTION INTRAMUSCULAR; INTRAVENOUS EVERY 30 MIN PRN
Status: DISCONTINUED | OUTPATIENT
Start: 2019-05-09 | End: 2019-05-09 | Stop reason: HOSPADM

## 2019-05-09 RX ORDER — LIDOCAINE 4 G/G
2 PATCH TOPICAL EVERY 12 HOURS PRN
COMMUNITY
End: 2022-11-18

## 2019-05-09 RX ORDER — MORPHINE SULFATE 15 MG/1
15 TABLET ORAL ONCE
Status: COMPLETED | OUTPATIENT
Start: 2019-05-09 | End: 2019-05-09

## 2019-05-09 RX ORDER — LABETALOL 20 MG/4 ML (5 MG/ML) INTRAVENOUS SYRINGE
10
Status: DISCONTINUED | OUTPATIENT
Start: 2019-05-09 | End: 2019-05-09 | Stop reason: HOSPADM

## 2019-05-09 RX ORDER — ONDANSETRON 4 MG/1
4 TABLET, ORALLY DISINTEGRATING ORAL EVERY 30 MIN PRN
Status: DISCONTINUED | OUTPATIENT
Start: 2019-05-09 | End: 2019-05-09 | Stop reason: HOSPADM

## 2019-05-09 RX ORDER — FENTANYL CITRATE 50 UG/ML
25-50 INJECTION, SOLUTION INTRAMUSCULAR; INTRAVENOUS
Status: DISCONTINUED | OUTPATIENT
Start: 2019-05-09 | End: 2019-05-09 | Stop reason: HOSPADM

## 2019-05-09 RX ADMIN — DEXMEDETOMIDINE HYDROCHLORIDE 4 MCG: 100 INJECTION, SOLUTION INTRAVENOUS at 10:03

## 2019-05-09 RX ADMIN — SODIUM CHLORIDE, POTASSIUM CHLORIDE, SODIUM LACTATE AND CALCIUM CHLORIDE: 600; 310; 30; 20 INJECTION, SOLUTION INTRAVENOUS at 09:16

## 2019-05-09 RX ADMIN — Medication 10 MG: at 09:51

## 2019-05-09 RX ADMIN — MORPHINE SULFATE 15 MG: 15 TABLET ORAL at 12:25

## 2019-05-09 RX ADMIN — SODIUM CHLORIDE, POTASSIUM CHLORIDE, SODIUM LACTATE AND CALCIUM CHLORIDE: 600; 310; 30; 20 INJECTION, SOLUTION INTRAVENOUS at 10:44

## 2019-05-09 RX ADMIN — CLINDAMYCIN PHOSPHATE 900 MG: 18 INJECTION, SOLUTION INTRAVENOUS at 09:22

## 2019-05-09 RX ADMIN — DEXMEDETOMIDINE HYDROCHLORIDE 8 MCG: 100 INJECTION, SOLUTION INTRAVENOUS at 09:33

## 2019-05-09 RX ADMIN — ONDANSETRON 4 MG: 2 INJECTION INTRAMUSCULAR; INTRAVENOUS at 10:03

## 2019-05-09 RX ADMIN — Medication 10 MG: at 10:22

## 2019-05-09 RX ADMIN — PROPOFOL 100 MCG/KG/MIN: 10 INJECTION, EMULSION INTRAVENOUS at 09:21

## 2019-05-09 RX ADMIN — FENTANYL CITRATE 50 MCG: 50 INJECTION, SOLUTION INTRAMUSCULAR; INTRAVENOUS at 09:22

## 2019-05-09 RX ADMIN — MIDAZOLAM 2 MG: 1 INJECTION INTRAMUSCULAR; INTRAVENOUS at 09:22

## 2019-05-09 RX ADMIN — LIDOCAINE HYDROCHLORIDE 40 MG: 20 INJECTION, SOLUTION INFILTRATION; PERINEURAL at 09:22

## 2019-05-09 RX ADMIN — FENTANYL CITRATE 50 MCG: 50 INJECTION, SOLUTION INTRAMUSCULAR; INTRAVENOUS at 10:19

## 2019-05-09 ASSESSMENT — MIFFLIN-ST. JEOR: SCORE: 1837.85

## 2019-05-09 ASSESSMENT — LIFESTYLE VARIABLES: TOBACCO_USE: 1

## 2019-05-09 NOTE — DISCHARGE INSTRUCTIONS
Same Day Surgery Discharge Instructions for  Sedation and General Anesthesia       It's not unusual to feel dizzy, light-headed or faint for up to 24 hours after surgery or while taking pain medication.  If you have these symptoms: sit for a few minutes before standing and have someone assist you when you get up to walk or use the bathroom.      You should rest and relax for the next 24 hours. We recommend you make arrangements to have an adult stay with you for at least 24 hours after your discharge.  Avoid hazardous and strenuous activity.      DO NOT DRIVE any vehicle or operate mechanical equipment for 24 hours following the end of your surgery.  Even though you may feel normal, your reactions may be affected by the medication you have received.      Do not drink alcoholic beverages for 24 hours following surgery.       Slowly progress to your regular diet as you feel able. It's not unusual to feel nauseated and/or vomit after receiving anesthesia.  If you develop these symptoms, drink clear liquids (apple juice, ginger ale, broth, 7-up, etc. ) until you feel better.  If your nausea and vomiting persists for 24 hours, please notify your surgeon.        All narcotic pain medications, along with inactivity and anesthesia, can cause constipation. Drinking plenty of liquids and increasing fiber intake will help.      For any questions of a medical nature, call your surgeon.      Do not make important decisions for 24 hours.      If you had general anesthesia, you may have a sore throat for a couple of days related to the breathing tube used during surgery.  You may use Cepacol lozenges to help with this discomfort.  If it worsens or if you develop a fever, contact your surgeon.       If you feel your pain is not well managed with the pain medications prescribed by your surgeon, please contact your surgeon's office to let them know so they can address your concerns.       Spinal Cord Stimulator Implant Discharge  Instruction      After Surgery:     Resume light activity as tolerated    Restrict bending, lifting and twisting to reduce the chance of your leads moving for 6-8 weeks    Resume your regular pain medication regimen    Begin taking your oral antibiotic the day after your surgery.  It is important you finish the entire course of medication until gone.    Driving is not recommended while your stimulator is turned on    A small amount of blood visible on the bandages is normal, if any leaks out of the bandage contact [facility]    Site Care:    DO NOT remove any bandages from the surgery.  A RN or your provider will remove the bandages at your follow-up appointment at [facility].    Your incisions have been closed with dissolvable stitches.  There are steri-strips covering your incision under your bandages.  Do not remove these, they will fall off on their own in 5-7 days.    After the bandages are removed, check your incision sites daily.  Keep sites clean and dry.    During the first few days, you may notice some swelling or discoloration around the incision sites, which is normal.  Some fluid (yellow to light red to orange) may ooze or leak from the incisions. However, if the fluid is foul smelling, thick, or does not decrease in amount, call [facility].    DO NOT shower until your bandages have been removed.    NO soaking your incisions for 2 weeks.  This includes baths, whirlpools, swimming pools, and hot tubs.    Call [facility] during business hours or the on-call call provider after hours if you develop any of the following:    Signs of infection such as: fevers, chills, increased pain, drainage (as mentioned above), redness, and swelling from your incision sites.    Headache persisting for more than 48 hours    Unusual changes in or stopping of sensation in your legs or back.     Uncontrolled pain.     Signs or symptoms of a deep vein thrombosis (DVT) :  swelling in one orbrboth legs, pain or tenderness in one or both legs, warm skin on your leg, and/or red or discolored skin on your leg    [facility]  [phone]    After hours on-call provider     Emergency Situations go to the Emergency Department or Call 911:    Sudden severe back pain    Sudden onset of leg weakness and spasms    Loss of bladder control and/or bowel function    Signs or symptoms or a pulmonary embolism (PE): sudden coughing --which may bring up blood, sharp chest pain, rapid breathing or shortness of breath, and/or severe light headedness         **If you have questions or concerns about your procedure,  call Dr. Pandey at [phone]**           Follow up in clinic on 5/16 at 1:20 PM

## 2019-05-09 NOTE — ANESTHESIA CARE TRANSFER NOTE
Patient: Swathi Gibbons    Procedure(s):  INSERTION, SPINAL CORD STIMULATOR, DORSAL COLUMN ([employer])    Diagnosis: POSTLAMINECTOMY SYNDROME  Diagnosis Additional Information: No value filed.    Anesthesia Type:   MAC     Note:  Airway :Nasal Cannula  Patient transferred to:PACU  Handoff Report: Identifed the Patient, Identified the Reponsible Provider, Reviewed the pertinent medical history, Discussed the surgical course, Reviewed Intra-OP anesthesia mangement and issues during anesthesia, Set expectations for post-procedure period and Allowed opportunity for questions and acknowledgement of understanding      Vitals: (Last set prior to Anesthesia Care Transfer)    CRNA VITALS  5/9/2019 1025 - 5/9/2019 1102      5/9/2019             Resp Rate (set):  10                Electronically Signed By: JIMMIE Jean-Baptiste CRNA  May 9, 2019  11:02 AM

## 2019-05-09 NOTE — OR NURSING
Printed and verbal instructions were given to the patient and assigned care taker.  Patient and caretaker verbalized understanding discharge instructions and has no further question. No knowledge deficit noted. Patient's belongings were returned to the patient and care taker. Patient was transferred to a wheel chair and was accompanied by volunteer service to the hospital entrance door to be discharged to patient's home. Per patient, [employer] to see patient during her next clinic visit.

## 2019-05-09 NOTE — ANESTHESIA POSTPROCEDURE EVALUATION
Patient: Swathi Gibbons    Procedure(s):  INSERTION, SPINAL CORD STIMULATOR, DORSAL COLUMN ([employer])    Diagnosis:POSTLAMINECTOMY SYNDROME  Diagnosis Additional Information: No value filed.    Anesthesia Type:  MAC    Note:  Anesthesia Post Evaluation    Patient location during evaluation: PACU  Patient participation: Able to fully participate in evaluation  Level of consciousness: awake and alert  Pain management: adequate  Airway patency: patent  Cardiovascular status: acceptable  Respiratory status: acceptable and unassisted  Hydration status: acceptable  PONV: none             Last vitals:  Vitals:    05/09/19 1156 05/09/19 1200 05/09/19 1215   BP: 122/80 117/87 118/85   Pulse:   87   Resp:  17 10   Temp:      SpO2:  94% 92%         Electronically Signed By: Gabbie Fallon MD  May 9, 2019  12:54 PM

## 2019-05-09 NOTE — PROGRESS NOTES
Admission medication history interview status for the 5/9/2019  admission is complete. See EPIC admission navigator for prior to admission medications     Medication history source reliability:Moderate    Medication history interview source(s):Patient    Medication history resources (including written lists, pill bottles, clinic record):CLINIC RECORD    Primary pharmacy.[facility]    Additional medication history information not noted on PTA med list :None    Time spent in this activity: 70 MINUTES    Prior to Admission medications    Medication Sig Last Dose Taking? Auth Provider   albuterol (PROAIR HFA/PROVENTIL HFA/VENTOLIN HFA) 108 (90 Base) MCG/ACT inhaler Inhale 2 puffs into the lungs 3 times daily as needed for shortness of breath / dyspnea or wheezing (VENTOLIN) MORE THAN A MONTH at PRN Yes Reported, Patient   Artificial Tear Solution (SOOTHE HYDRATION OP) Apply 1 drop to eye 4 times daily as needed  5/9/2019 at 0610 Yes Reported, Patient   buPROPion (WELLBUTRIN XL) 150 MG 24 hr tablet Take 150 mg by mouth every morning (Takes with 300mg tablet for total dose of 450mg) 5/9/2019 at 0610 Yes Reported, Patient   buPROPion (WELLBUTRIN XL) 300 MG 24 hr tablet Take 300 mg by mouth every morning (Take with 150 mg for a total of 450 mg daily) 5/9/2019 at 0610 Yes Reported, Patient   busPIRone (BUSPAR) 5 MG tablet Take 5 mg by mouth daily as needed  5/6/2019 at PRN Yes Reported, Patient   cyclobenzaprine (FLEXERIL) 10 MG tablet Take 10 mg by mouth 3 times daily  5/8/2019 at PM Yes Reported, Patient   cycloSPORINE (RESTASIS) 0.05 % ophthalmic emulsion Place 1 drop into both eyes 2 times daily 5/9/2019 at 0610 Yes Reported, Patient   diltiazem (CARDIZEM) 60 MG tablet Take 60 mg by mouth 3 times daily 5/9/2019 at 0610 Yes Reported, Patient   DULoxetine (CYMBALTA) 60 MG capsule Take 120 mg by mouth every evening (takes 2 x 60mg = 120mg daily) 5/8/2019 at PM Yes Reported, Patient   fish oil-omega-3 fatty acids 1000 MG  capsule Take 1 g by mouth daily MORE THAN 2 WEEK at AM Yes Reported, Patient   fluticasone (FLONASE) 50 MCG/ACT nasal spray Spray 1 spray into both nostrils 2 times daily  5/8/2019 at PM Yes Reported, Patient   Fluticasone-Salmeterol (AIRDUO RESPICLICK 113/14 IN) Inhale 1 puff into the lungs daily  5/9/2019 at 0610 Yes Reported, Patient   gabapentin (NEURONTIN) 300 MG capsule Take 900 mg by mouth 3 times daily (300MG X 3 = 900MG) 5/9/2019 at 0610 Yes Reported, Patient   leflunomide (ARAVA) 20 MG tablet Take 20 mg by mouth every evening  5/8/2019 at PM Yes Reported, Patient   levothyroxine (SYNTHROID/LEVOTHROID) 75 MCG tablet Take 75 mcg by mouth daily 5/9/2019 at 0610 Yes Reported, Patient   Lidocaine (LIDOCARE) 4 % Patch Place 2 patches onto the skin every 12 hours as needed for moderate pain 5/7/2019 at PRN Yes Reported, Patient   lidocaine (LMX4) 4 % external cream Apply topically daily as needed for mild pain  5/7/2019 at PRN Yes Reported, Patient   metFORMIN (GLUCOPHAGE-XR) 500 MG 24 hr tablet Take 1,000 mg by mouth daily (500MG X 2 = 1,000MG) 5/8/2019 at AM Yes Reported, Patient   methylPREDNISolone (MEDROL) 4 MG tablet Take 4 mg by mouth daily 5/9/2019 at 0610 Yes Reported, Patient   mirabegron (MYRBETRIQ) 25 MG 24 hr tablet Take 25 mg by mouth every evening  5/8/2019 at PM Yes Reported, Patient   modafinil (PROVIGIL) 200 MG tablet Take 200 mg by mouth every morning 5/8/2019 at AM Yes Reported, Patient   morphine (MSIR) 15 MG IR tablet Take 15 mg by mouth 3 times daily as needed for severe pain 5/8/2019 at NOON Yes Reported, Patient   multivitamin, therapeutic (THERA-VIT) TABS tablet Take 1 tablet by mouth daily 5/8/2019 at AM Yes Reported, Patient   naloxone (NARCAN) 4 MG/0.1ML nasal spray Spray 4 mg into one nostril alternating nostrils once as needed for opioid reversal every 2-3 minutes until assistance arrives ON HAND Yes Reported, Patient   nystatin (MYCOSTATIN) 285174 UNIT/GM external cream Apply  topically 2 times daily as needed for dry skin  MORE THAN A MONTH at PRN Yes Reported, Patient   nystatin (MYCOSTATIN) 501801 UNIT/GM external powder Apply topically 2 times daily as needed Under abdominal wall  MORE THAN A WEEK at PRN Yes Reported, Patient   omeprazole (PRILOSEC) 20 MG DR capsule Take 20 mg by mouth 2 times daily  5/8/2019 at PM Yes Reported, Patient   oxyCODONE (OXYCONTIN) 15 MG 12 hr tablet Take 15 mg by mouth 3 times daily 5/9/2019 at 0610 Yes Reported, Patient   phentermine (ADIPEX-P) 37.5 MG tablet Take 18.75 mg by mouth daily (37.5MG X 0.5 = 18.75MG) 5/8/2019 at AM Yes Reported, Patient   secukinumab (COSENTYX) 150 MG/ML Sensoready pen Inject 300 mg Subcutaneous every 28 days 6 WEEKS AGO Yes Reported, Patient   topiramate (TOPAMAX) 50 MG tablet Take 50 mg by mouth 2 times daily 5/9/2019 at 0610 Yes Reported, Patient   traZODone (DESYREL) 100 MG tablet Take 100 mg by mouth At Bedtime  5/8/2019 at PM Yes Reported, Patient   varenicline (CHANTIX) 1 MG tablet Take 1 mg by mouth 2 times daily 5/8/2019 at PM Yes Reported, Patient   vitamin D2 (ERGOCALCIFEROL) 00767 units (1250 mcg) capsule Take 50,000 Units by mouth four times a week  5/8/2019 at PM Yes Reported, Patient   Xylitol 500 MG DISK Take 1 Dose by mouth every evening as needed 5/8/2019 at PM Yes Reported, Patient

## 2019-05-09 NOTE — ANESTHESIA PREPROCEDURE EVALUATION
Anesthesia Pre-Procedure Evaluation    Patient: Swathi Gibbons   MRN: [id number] : 1965          Preoperative Diagnosis: POSTLAMINECTOMY SYNDROME    Procedure(s):  INSERTION, SPINAL CORD STIMULATOR, DORSAL COLUMN ([employer])    Past Medical History:   Diagnosis Date     Alcohol use disorder, severe, in sustained remission (H)      Anxiety and depression      Arthritis     PSORIATIC     Chronic rhinitis      Chronic rhinitis      COPD (chronic obstructive pulmonary disease) (H)      Diabetes (H)     Type II     Esophageal dysmotility      EtOH dependence (H)      Gastroesophageal reflux disease      Hypertension      Hypothyroidism      Latent tuberculosis      MRSA (methicillin resistant staph aureus) culture positive      Nodular goiter      NABOR (obstructive sleep apnea)     C-PAP     Other chronic pain     back pain     Psoriasis      Secondary hyperparathyroidism (H)      Tracheostomy in place (H)      Uncomplicated asthma      Vitamin D deficiency      Past Surgical History:   Procedure Laterality Date     BACK SURGERY      L4-5 Decompression and Fusion     BREAST SURGERY      Right breast biopsy, benign     ENT SURGERY      Tonsillectomy     EYE SURGERY      Lasik     GI SURGERY      Bladder Suspension     GYN SURGERY       Section     GYN SURGERY      Tubal Ligation     INSERT STIMULATOR DORSAL COLUMN TRIAL N/A 2019    Procedure: SPINAL CORD STIMULATOR  TRIAL;  Surgeon: Reginald Pandey MD;  Location:  OR     ORTHOPEDIC SURGERY      ORIF Ankle Left 2017     ORTHOPEDIC SURGERY      Bunionectomy [facility].       Anesthesia Evaluation     . Pt has had prior anesthetic.     No history of anesthetic complications          ROS/MED HX    ENT/Pulmonary:     (+)sleep apnea, tobacco use, Past use asthma Treatment: Inhaler daily and Inhaled steroids,  uses CPAP , . Other pulmonary disease latent TB.    Neurologic:  - neg neurologic ROS     Cardiovascular:     (+) Dyslipidemia, hypertension----. : . .  ". :. .       METS/Exercise Tolerance:     Hematologic:         Musculoskeletal:   (+) arthritis,  -       GI/Hepatic:     (+) GERD Asymptomatic on medication, Other GI/Hepatic PEPTIC ULCER      Renal/Genitourinary:  - ROS Renal section negative       Endo:     (+) type II DM Not using insulin thyroid problem hypothyroidism, Chronic steroid usage for Arthritis Obesity, .      Psychiatric:     (+) psychiatric history anxiety and depression      Infectious Disease:         Malignancy:         Other:    (+) H/O Chronic Pain,H/O chronic opiod use ,                         Physical Exam  Normal systems: cardiovascular and pulmonary    Airway   Mallampati: III  TM distance: >3 FB  Neck ROM: full    Dental   (+) other    Cardiovascular       Pulmonary             Lab Results   Component Value Date    WBC 6.5 09/14/2007    HGB 15.2 09/14/2007    HCT 43.6 09/14/2007     09/14/2007     (H) 09/14/2007    POTASSIUM 3.7 09/14/2007    CHLORIDE 105 09/14/2007    CO2 27 09/14/2007    BUN 8 09/14/2007    CR 0.61 09/14/2007    GLC 96 09/16/2007    EVELIA 9.0 09/14/2007    PHOS 3.5 09/14/2007    MAG 2.1 09/14/2007    ALBUMIN 4.4 09/14/2007    PROTTOTAL 7.8 09/14/2007    ALT 11 09/14/2007    AST 26 09/14/2007    GGT 30 09/14/2007    ALKPHOS 121 09/14/2007    BILITOTAL 0.4 09/14/2007    LIPASE 50 09/15/2007    TSH 2.73 09/14/2007       Preop Vitals  BP Readings from Last 3 Encounters:   04/04/19 121/81    Pulse Readings from Last 3 Encounters:   04/04/19 79      Resp Readings from Last 3 Encounters:   04/04/19 12    SpO2 Readings from Last 3 Encounters:   04/04/19 94%      Temp Readings from Last 1 Encounters:   04/04/19 36.7  C (98.1  F) (Temporal)    Ht Readings from Last 1 Encounters:   04/04/19 1.6 m (5' 3\")      Wt Readings from Last 1 Encounters:   04/04/19 130.4 kg (287 lb 8 oz)    Estimated body mass index is 50.93 kg/m  as calculated from the following:    Height as of 4/4/19: 1.6 m (5' 3\").    Weight as of 4/4/19: " 130.4 kg (287 lb 8 oz).       Anesthesia Plan      History & Physical Review  History and physical reviewed and following examination; no interval change.    ASA Status:  3 .    NPO Status:  > 8 hours    Plan for MAC Reason for MAC:  Deep or markedly invasive procedure (G8)  PONV prophylaxis:  Ondansetron (or other 5HT-3)       Postoperative Care  Postoperative pain management:  Multi-modal analgesia.      Consents  Anesthetic plan, risks, benefits and alternatives discussed with:  Patient..                 Gabbie Fallon MD

## 2019-05-29 NOTE — OP NOTE
Name:    Swathi Gibbons   :    1965  [facility]:   [facility]  Procedure date: 2019 09:30 AM  Procedure:    Medtronic Lumbar Spinal Cord Stimulator Implant  Indication:  Postlaminectomy syndrome, not elsewhere classified M96.1    Allergies:  Ingredient Reaction (Severity) Medication Name Comment   MOXIFLOXACIN HCL  Avelox    SULFAMETHOXAZOLE  Septra    TRIMETHOPRIM  Septra      This procedure was performed using MAC sedation due to the patient's anxiety.    Anesthesia: MAC and Local.    Implants: All implants were Medtronic products. The lead lot numbers were [id number] and [id number]. The implantable pulse generator was lot [id number] and serial number [id number].    Description of Procedure:  After discussing potential risks and benefits, the patient did wish to proceed and signed a written consent form. The pocket site was marked in the preoperative area. An antibiotic prescription was given to the patient to use for the next 10 days for prophylaxis. The patient was brought into the operating room and positioned prone on the operating table taking care relieve all pressure points and place extremities in a comfortable position. MAC anesthesia was induced. The operative field was prepped and draped in normal sterile fashion. The skin was anesthetized with Xylocaine at the appropriate level as identified fluoroscopically.  A paramedian incision was then made in the mid back. A 14 gauge Tuohy needle was then introduced under fluoroscopy. The left T12-L1 epidural space was entered as a loss of resistance was felt with an air filled syringe. The Eight contact compact spinal cord stimulator lead was advanced to the mid T8 vertebral level. This lead was left of midline.  A second Eight contact compact spinal cord stimulator lead was placed through the right T12-L1 epidural space and advanced to the mid T7 vertebral level. This lead was right of midline. Lateral and AP fluoroscopy views were  obtained and confirmed appropriate posterior epidural placement.      The anchors were placed and tightened around the leads using and then to the fascia using 2.0 silk sutures. A superficial gluteal pocket was developed on the right side.  A subcutaneous tunnel was made and the cables were pulled through to the gluteal pocket. The cable ends were connected to the IPG and all connections were secured to torque. An impedance check was performed and all impedances were within normal limits. The IPG and excess cable were then placed into the gluteal pocket with the excess cable coiled behind the IPG. The wounds were then irrigated profusely with saline solution and 1g of Vancomycin powder was dispersed between the two incision sites. The incisions were closed in layers using absorbable sutures. Steri-strips were used as well as sutures. Final X-ray revealed no significant changes in lead positions.      The patient was awakened, positioned supine and delivered to the recovery  room. Discharge post-op care instructions were reviewed with the patient, and the patient verbalized understanding and was provided with a written copy. IV was discontinued with catheter intact and patient was then discharged.     Reginald Pandey MD  05/09/2019 09:30 AM

## 2021-01-31 NOTE — ANESTHESIA PREPROCEDURE EVALUATION
Anesthesia Pre-Procedure Evaluation    Patient: Swathi Gibbons   MRN: [id number] : 1965          Preoperative Diagnosis: POST LAMINECTOMY SYNDROME    Procedure(s):  SPINAL CORD STIMULATOR  TRIAL ([employer]IC)^    Past Medical History:   Diagnosis Date     Alcohol use disorder, severe, in sustained remission (H)      Anxiety and depression      Arthritis      Chronic rhinitis      Chronic rhinitis      COPD (chronic obstructive pulmonary disease) (H)      Diabetes (H)     Type II     Esophageal dysmotility      EtOH dependence (H)      Gastroesophageal reflux disease      Hypertension      Hypothyroidism      Latent tuberculosis      MRSA (methicillin resistant staph aureus) culture positive      Nodular goiter      NABOR (obstructive sleep apnea)      Other chronic pain     back pain     Psoriasis      Secondary hyperparathyroidism (H)      Tracheostomy in place (H)      Uncomplicated asthma      Vitamin D deficiency      Past Surgical History:   Procedure Laterality Date     BACK SURGERY      L4-5 Decompression and Fusion     BREAST SURGERY      Right breast biopsy, benign     ENT SURGERY      Tonsillectomy     EYE SURGERY      Lasik     GI SURGERY      Bladder Suspension     GYN SURGERY       Section     GYN SURGERY      Tubal Ligation     ORTHOPEDIC SURGERY      ORIF Ankle Left 2017     ORTHOPEDIC SURGERY      Bunionectomy [facility].       Anesthesia Evaluation     . Pt has had prior anesthetic.            ROS/MED HX    ENT/Pulmonary:     (+)sleep apnea, COPD, uses CPAP , . Other pulmonary disease LATENT TB.    Neurologic:       Cardiovascular:     (+) Dyslipidemia, hypertension----. : . . . :. .       METS/Exercise Tolerance:     Hematologic:         Musculoskeletal:         GI/Hepatic:     (+) GERD Asymptomatic on medication, Other GI/Hepatic Peptic ulcer ds      Renal/Genitourinary:         Endo:     (+) type II DM Not using insulin thyroid problem hypothyroidism, Obesity, .      Psychiatric:     (+)  "psychiatric history anxiety and depression      Infectious Disease: Comment: LATENT TB  (+) TB,       Malignancy:         Other: Comment: H/O ALCOHOL ABUSE - IN REMISSION   (+) H/O Chronic Pain,H/O chronic opiod use ,                         Physical Exam  Normal systems: cardiovascular and pulmonary    Airway   Mallampati: III  TM distance: >3 FB  Neck ROM: full    Dental   (+) caps    Cardiovascular       Pulmonary             Lab Results   Component Value Date    WBC 6.5 09/14/2007    HGB 15.2 09/14/2007    HCT 43.6 09/14/2007     09/14/2007     (H) 09/14/2007    POTASSIUM 3.7 09/14/2007    CHLORIDE 105 09/14/2007    CO2 27 09/14/2007    BUN 8 09/14/2007    CR 0.61 09/14/2007    GLC 96 09/16/2007    EVELIA 9.0 09/14/2007    PHOS 3.5 09/14/2007    MAG 2.1 09/14/2007    ALBUMIN 4.4 09/14/2007    PROTTOTAL 7.8 09/14/2007    ALT 11 09/14/2007    AST 26 09/14/2007    GGT 30 09/14/2007    ALKPHOS 121 09/14/2007    BILITOTAL 0.4 09/14/2007    LIPASE 50 09/15/2007    TSH 2.73 09/14/2007       Preop Vitals  BP Readings from Last 3 Encounters:   04/04/19 144/75    Pulse Readings from Last 3 Encounters:   04/04/19 98      Resp Readings from Last 3 Encounters:   04/04/19 16    SpO2 Readings from Last 3 Encounters:   04/04/19 97%      Temp Readings from Last 1 Encounters:   04/04/19 36.2  C (97.2  F) (Temporal)    Ht Readings from Last 1 Encounters:   04/04/19 1.6 m (5' 3\")      Wt Readings from Last 1 Encounters:   04/04/19 130.4 kg (287 lb 8 oz)    Estimated body mass index is 50.93 kg/m  as calculated from the following:    Height as of this encounter: 1.6 m (5' 3\").    Weight as of this encounter: 130.4 kg (287 lb 8 oz).       Anesthesia Plan      History & Physical Review  History and physical reviewed and following examination; no interval change.    ASA Status:  3 .    NPO Status:  > 8 hours    Plan for MAC Reason for MAC:  Deep or markedly invasive procedure (G8)  PONV prophylaxis:  Ondansetron (or other " 5HT-3) and Dexamethasone or Solumedrol       Postoperative Care  Postoperative pain management:  Multi-modal analgesia.      Consents  Anesthetic plan, risks, benefits and alternatives discussed with:  Patient..                 Gabbie Fallon MD   Self

## 2021-02-24 ENCOUNTER — AMBULATORY - HEALTHEAST (OUTPATIENT)
Dept: SURGERY | Facility: CLINIC | Age: 56
End: 2021-02-24

## 2021-02-24 DIAGNOSIS — Z11.59 ENCOUNTER FOR SCREENING FOR OTHER VIRAL DISEASES: ICD-10-CM

## 2021-03-11 ASSESSMENT — MIFFLIN-ST. JEOR: SCORE: 1689.05

## 2021-03-14 ENCOUNTER — AMBULATORY - HEALTHEAST (OUTPATIENT)
Dept: FAMILY MEDICINE | Facility: CLINIC | Age: 56
End: 2021-03-14

## 2021-03-14 DIAGNOSIS — Z11.59 ENCOUNTER FOR SCREENING FOR OTHER VIRAL DISEASES: ICD-10-CM

## 2021-03-15 ENCOUNTER — ANESTHESIA - HEALTHEAST (OUTPATIENT)
Dept: SURGERY | Facility: CLINIC | Age: 56
End: 2021-03-15

## 2021-03-15 LAB
SARS-COV-2 PCR COMMENT: NORMAL
SARS-COV-2 RNA SPEC QL NAA+PROBE: NEGATIVE
SARS-COV-2 VIRUS SPECIMEN SOURCE: NORMAL

## 2021-03-16 ENCOUNTER — COMMUNICATION - HEALTHEAST (OUTPATIENT)
Dept: SCHEDULING | Facility: CLINIC | Age: 56
End: 2021-03-16

## 2021-03-16 ENCOUNTER — SURGERY - HEALTHEAST (OUTPATIENT)
Dept: SURGERY | Facility: CLINIC | Age: 56
End: 2021-03-16

## 2021-03-16 ASSESSMENT — MIFFLIN-ST. JEOR: SCORE: 1692.68

## 2021-03-17 ASSESSMENT — MIFFLIN-ST. JEOR: SCORE: 1692.68

## 2021-04-11 ENCOUNTER — HEALTH MAINTENANCE LETTER (OUTPATIENT)
Age: 56
End: 2021-04-11

## 2021-04-12 ENCOUNTER — TRANSFERRED RECORDS (OUTPATIENT)
Dept: HEALTH INFORMATION MANAGEMENT | Facility: CLINIC | Age: 56
End: 2021-04-12

## 2021-04-13 ENCOUNTER — APPOINTMENT (OUTPATIENT)
Dept: GENERAL RADIOLOGY | Facility: CLINIC | Age: 56
DRG: 660 | End: 2021-04-13
Attending: UROLOGY
Payer: MEDICARE

## 2021-04-13 ENCOUNTER — ANESTHESIA (OUTPATIENT)
Dept: SURGERY | Facility: CLINIC | Age: 56
DRG: 660 | End: 2021-04-13
Payer: MEDICARE

## 2021-04-13 ENCOUNTER — HOSPITAL ENCOUNTER (INPATIENT)
Facility: CLINIC | Age: 56
LOS: 2 days | Discharge: HOME HEALTH | DRG: 660 | End: 2021-04-15
Attending: UROLOGY | Admitting: UROLOGY
Payer: MEDICARE

## 2021-04-13 ENCOUNTER — ANESTHESIA EVENT (OUTPATIENT)
Dept: SURGERY | Facility: CLINIC | Age: 56
DRG: 660 | End: 2021-04-13
Payer: MEDICARE

## 2021-04-13 DIAGNOSIS — N39.0 URINARY TRACT INFECTION DUE TO PROTEUS: ICD-10-CM

## 2021-04-13 DIAGNOSIS — N32.89 BLADDER SPASMS: ICD-10-CM

## 2021-04-13 DIAGNOSIS — N20.1 LEFT URETERAL STONE: ICD-10-CM

## 2021-04-13 DIAGNOSIS — Z22.322 MRSA (METHICILLIN RESISTANT STAPH AUREUS) CULTURE POSITIVE: ICD-10-CM

## 2021-04-13 DIAGNOSIS — N20.0 NEPHROLITHIASIS: ICD-10-CM

## 2021-04-13 DIAGNOSIS — B96.4 URINARY TRACT INFECTION DUE TO PROTEUS: ICD-10-CM

## 2021-04-13 DIAGNOSIS — R78.81 BACTEREMIA DUE TO GRAM-NEGATIVE BACTERIA: ICD-10-CM

## 2021-04-13 DIAGNOSIS — N20.0 NEPHROLITHIASIS: Primary | ICD-10-CM

## 2021-04-13 LAB
ANION GAP SERPL CALCULATED.3IONS-SCNC: 10 MMOL/L (ref 3–14)
BACTERIA SPEC CULT: NORMAL
BUN SERPL-MCNC: 19 MG/DL (ref 7–30)
CALCIUM SERPL-MCNC: 7.9 MG/DL (ref 8.5–10.1)
CHLORIDE SERPL-SCNC: 106 MMOL/L (ref 94–109)
CO2 SERPL-SCNC: 22 MMOL/L (ref 20–32)
CREAT SERPL-MCNC: 1 MG/DL (ref 0.52–1.04)
CREAT SERPL-MCNC: 1.01 MG/DL (ref 0.52–1.04)
ERYTHROCYTE [DISTWIDTH] IN BLOOD BY AUTOMATED COUNT: 16.2 % (ref 10–15)
GFR SERPL CREATININE-BSD FRML MDRD: 62 ML/MIN/{1.73_M2}
GFR SERPL CREATININE-BSD FRML MDRD: 63 ML/MIN/{1.73_M2}
GLUCOSE BLDC GLUCOMTR-MCNC: 115 MG/DL (ref 70–99)
GLUCOSE BLDC GLUCOMTR-MCNC: 126 MG/DL (ref 70–99)
GLUCOSE BLDC GLUCOMTR-MCNC: 127 MG/DL (ref 70–99)
GLUCOSE BLDC GLUCOMTR-MCNC: 133 MG/DL (ref 70–99)
GLUCOSE BLDC GLUCOMTR-MCNC: 166 MG/DL (ref 70–99)
GLUCOSE SERPL-MCNC: 128 MG/DL (ref 70–99)
HBA1C MFR BLD: 5.6 % (ref 0–5.6)
HCT VFR BLD AUTO: 35.7 % (ref 35–47)
HGB BLD-MCNC: 11.2 G/DL (ref 11.7–15.7)
LABORATORY COMMENT REPORT: NORMAL
LACTATE BLD-SCNC: 1.7 MMOL/L (ref 0.7–2)
MCH RBC QN AUTO: 28.2 PG (ref 26.5–33)
MCHC RBC AUTO-ENTMCNC: 31.4 G/DL (ref 31.5–36.5)
MCV RBC AUTO: 90 FL (ref 78–100)
PLATELET # BLD AUTO: 167 10E9/L (ref 150–450)
POTASSIUM SERPL-SCNC: 4 MMOL/L (ref 3.4–5.3)
RBC # BLD AUTO: 3.97 10E12/L (ref 3.8–5.2)
SARS-COV-2 RNA RESP QL NAA+PROBE: NEGATIVE
SODIUM SERPL-SCNC: 138 MMOL/L (ref 133–144)
SPECIMEN SOURCE: NORMAL
SPECIMEN SOURCE: NORMAL
WBC # BLD AUTO: 8.1 10E9/L (ref 4–11)

## 2021-04-13 PROCEDURE — 999N000040 HC STATISTIC CONSULT NO CHARGE VASC ACCESS

## 2021-04-13 PROCEDURE — 999N001017 HC STATISTIC GLUCOSE BY METER IP

## 2021-04-13 PROCEDURE — 258N000003 HC RX IP 258 OP 636: Performed by: NURSE ANESTHETIST, CERTIFIED REGISTERED

## 2021-04-13 PROCEDURE — 258N000003 HC RX IP 258 OP 636: Performed by: INTERNAL MEDICINE

## 2021-04-13 PROCEDURE — 80048 BASIC METABOLIC PNL TOTAL CA: CPT | Performed by: STUDENT IN AN ORGANIZED HEALTH CARE EDUCATION/TRAINING PROGRAM

## 2021-04-13 PROCEDURE — 0T778DZ DILATION OF LEFT URETER WITH INTRALUMINAL DEVICE, VIA NATURAL OR ARTIFICIAL OPENING ENDOSCOPIC: ICD-10-PCS | Performed by: UROLOGY

## 2021-04-13 PROCEDURE — 87040 BLOOD CULTURE FOR BACTERIA: CPT | Performed by: INTERNAL MEDICINE

## 2021-04-13 PROCEDURE — 83605 ASSAY OF LACTIC ACID: CPT | Performed by: STUDENT IN AN ORGANIZED HEALTH CARE EDUCATION/TRAINING PROGRAM

## 2021-04-13 PROCEDURE — 710N000011 HC RECOVERY PHASE 1, LEVEL 3, PER MIN: Performed by: UROLOGY

## 2021-04-13 PROCEDURE — P9041 ALBUMIN (HUMAN),5%, 50ML: HCPCS | Performed by: NURSE ANESTHETIST, CERTIFIED REGISTERED

## 2021-04-13 PROCEDURE — C1769 GUIDE WIRE: HCPCS | Performed by: UROLOGY

## 2021-04-13 PROCEDURE — 250N000011 HC RX IP 250 OP 636: Performed by: NURSE ANESTHETIST, CERTIFIED REGISTERED

## 2021-04-13 PROCEDURE — 36415 COLL VENOUS BLD VENIPUNCTURE: CPT | Performed by: UROLOGY

## 2021-04-13 PROCEDURE — 87040 BLOOD CULTURE FOR BACTERIA: CPT | Performed by: STUDENT IN AN ORGANIZED HEALTH CARE EDUCATION/TRAINING PROGRAM

## 2021-04-13 PROCEDURE — 272N000001 HC OR GENERAL SUPPLY STERILE: Performed by: UROLOGY

## 2021-04-13 PROCEDURE — 999N000141 HC STATISTIC PRE-PROCEDURE NURSING ASSESSMENT: Performed by: UROLOGY

## 2021-04-13 PROCEDURE — 83036 HEMOGLOBIN GLYCOSYLATED A1C: CPT | Performed by: STUDENT IN AN ORGANIZED HEALTH CARE EDUCATION/TRAINING PROGRAM

## 2021-04-13 PROCEDURE — 999N000180 XR SURGERY CARM FLUORO LESS THAN 5 MIN: Mod: TC

## 2021-04-13 PROCEDURE — 120N000002 HC R&B MED SURG/OB UMMC

## 2021-04-13 PROCEDURE — 360N000082 HC SURGERY LEVEL 2 W/ FLUORO, PER MIN: Performed by: UROLOGY

## 2021-04-13 PROCEDURE — 36415 COLL VENOUS BLD VENIPUNCTURE: CPT | Performed by: STUDENT IN AN ORGANIZED HEALTH CARE EDUCATION/TRAINING PROGRAM

## 2021-04-13 PROCEDURE — 250N000011 HC RX IP 250 OP 636: Performed by: UROLOGY

## 2021-04-13 PROCEDURE — 250N000013 HC RX MED GY IP 250 OP 250 PS 637: Performed by: PHYSICIAN ASSISTANT

## 2021-04-13 PROCEDURE — 99207 PR CONSULT E&M CHANGED TO INITIAL LEVEL: CPT | Performed by: INTERNAL MEDICINE

## 2021-04-13 PROCEDURE — 82565 ASSAY OF CREATININE: CPT | Performed by: UROLOGY

## 2021-04-13 PROCEDURE — 250N000009 HC RX 250: Performed by: NURSE ANESTHETIST, CERTIFIED REGISTERED

## 2021-04-13 PROCEDURE — 250N000012 HC RX MED GY IP 250 OP 636 PS 637: Performed by: PHYSICIAN ASSISTANT

## 2021-04-13 PROCEDURE — 85027 COMPLETE CBC AUTOMATED: CPT | Performed by: STUDENT IN AN ORGANIZED HEALTH CARE EDUCATION/TRAINING PROGRAM

## 2021-04-13 PROCEDURE — 999N000127 HC STATISTIC PERIPHERAL IV START W US GUIDANCE

## 2021-04-13 PROCEDURE — 250N000013 HC RX MED GY IP 250 OP 250 PS 637: Performed by: INTERNAL MEDICINE

## 2021-04-13 PROCEDURE — 99207 PR NO BILLABLE SERVICE THIS VISIT: CPT | Performed by: INTERNAL MEDICINE

## 2021-04-13 PROCEDURE — C2617 STENT, NON-COR, TEM W/O DEL: HCPCS | Performed by: UROLOGY

## 2021-04-13 PROCEDURE — 87635 SARS-COV-2 COVID-19 AMP PRB: CPT | Performed by: STUDENT IN AN ORGANIZED HEALTH CARE EDUCATION/TRAINING PROGRAM

## 2021-04-13 PROCEDURE — 370N000017 HC ANESTHESIA TECHNICAL FEE, PER MIN: Performed by: UROLOGY

## 2021-04-13 PROCEDURE — 258N000003 HC RX IP 258 OP 636: Performed by: STUDENT IN AN ORGANIZED HEALTH CARE EDUCATION/TRAINING PROGRAM

## 2021-04-13 PROCEDURE — 36415 COLL VENOUS BLD VENIPUNCTURE: CPT | Performed by: INTERNAL MEDICINE

## 2021-04-13 PROCEDURE — 250N000011 HC RX IP 250 OP 636: Performed by: STUDENT IN AN ORGANIZED HEALTH CARE EDUCATION/TRAINING PROGRAM

## 2021-04-13 PROCEDURE — 250N000013 HC RX MED GY IP 250 OP 250 PS 637: Performed by: STUDENT IN AN ORGANIZED HEALTH CARE EDUCATION/TRAINING PROGRAM

## 2021-04-13 PROCEDURE — 99222 1ST HOSP IP/OBS MODERATE 55: CPT | Performed by: INTERNAL MEDICINE

## 2021-04-13 DEVICE — STENT URETERAL PERCUFLEX PLUS 6FRX26CM M0061752630
Type: IMPLANTABLE DEVICE | Site: URETER | Status: NON-FUNCTIONAL
Removed: 2021-05-04

## 2021-04-13 RX ORDER — TOLTERODINE TARTRATE 2 MG/1
2 TABLET, EXTENDED RELEASE ORAL EVERY 12 HOURS PRN
Status: DISCONTINUED | OUTPATIENT
Start: 2021-04-13 | End: 2021-04-15 | Stop reason: HOSPADM

## 2021-04-13 RX ORDER — TRAZODONE HYDROCHLORIDE 100 MG/1
100 TABLET ORAL AT BEDTIME
Status: DISCONTINUED | OUTPATIENT
Start: 2021-04-13 | End: 2021-04-15 | Stop reason: HOSPADM

## 2021-04-13 RX ORDER — KETOROLAC TROMETHAMINE 30 MG/ML
30 INJECTION, SOLUTION INTRAMUSCULAR; INTRAVENOUS EVERY 6 HOURS PRN
Status: DISCONTINUED | OUTPATIENT
Start: 2021-04-13 | End: 2021-04-15 | Stop reason: HOSPADM

## 2021-04-13 RX ORDER — FLUTICASONE PROPIONATE 50 MCG
1 SPRAY, SUSPENSION (ML) NASAL 2 TIMES DAILY
Status: DISCONTINUED | OUTPATIENT
Start: 2021-04-13 | End: 2021-04-13

## 2021-04-13 RX ORDER — HYDROMORPHONE HCL IN WATER/PF 6 MG/30 ML
0.2 PATIENT CONTROLLED ANALGESIA SYRINGE INTRAVENOUS
Status: DISCONTINUED | OUTPATIENT
Start: 2021-04-13 | End: 2021-04-15 | Stop reason: HOSPADM

## 2021-04-13 RX ORDER — CYCLOBENZAPRINE HCL 5 MG
10 TABLET ORAL 3 TIMES DAILY
Status: DISCONTINUED | OUTPATIENT
Start: 2021-04-13 | End: 2021-04-13

## 2021-04-13 RX ORDER — LEFLUNOMIDE 20 MG/1
20 TABLET ORAL EVERY EVENING
Status: DISCONTINUED | OUTPATIENT
Start: 2021-04-13 | End: 2021-04-13

## 2021-04-13 RX ORDER — ALBUTEROL SULFATE 90 UG/1
2 AEROSOL, METERED RESPIRATORY (INHALATION) 3 TIMES DAILY PRN
Status: DISCONTINUED | OUTPATIENT
Start: 2021-04-13 | End: 2021-04-15 | Stop reason: HOSPADM

## 2021-04-13 RX ORDER — MIRABEGRON 25 MG/1
25 TABLET, FILM COATED, EXTENDED RELEASE ORAL EVERY EVENING
Status: DISCONTINUED | OUTPATIENT
Start: 2021-04-13 | End: 2021-04-14

## 2021-04-13 RX ORDER — FLUTICASONE PROPIONATE AND SALMETEROL 113; 14 UG/1; UG/1
1 POWDER, METERED RESPIRATORY (INHALATION) DAILY
Status: DISCONTINUED | OUTPATIENT
Start: 2021-04-13 | End: 2021-04-13

## 2021-04-13 RX ORDER — CYCLOBENZAPRINE HCL 10 MG
10 TABLET ORAL 3 TIMES DAILY
Status: DISCONTINUED | OUTPATIENT
Start: 2021-04-13 | End: 2021-04-13

## 2021-04-13 RX ORDER — SODIUM CHLORIDE 9 MG/ML
INJECTION, SOLUTION INTRAVENOUS CONTINUOUS
Status: DISCONTINUED | OUTPATIENT
Start: 2021-04-13 | End: 2021-04-13

## 2021-04-13 RX ORDER — AMOXICILLIN 250 MG
1 CAPSULE ORAL 2 TIMES DAILY
Status: DISCONTINUED | OUTPATIENT
Start: 2021-04-13 | End: 2021-04-15 | Stop reason: HOSPADM

## 2021-04-13 RX ORDER — PHENYLEPHRINE HCL IN 0.9% NACL 50MG/250ML
0.5-6 PLASTIC BAG, INJECTION (ML) INTRAVENOUS CONTINUOUS
Status: DISCONTINUED | OUTPATIENT
Start: 2021-04-13 | End: 2021-04-13

## 2021-04-13 RX ORDER — VARENICLINE TARTRATE 1 MG/1
1 TABLET, FILM COATED ORAL 2 TIMES DAILY
Status: DISCONTINUED | OUTPATIENT
Start: 2021-04-13 | End: 2021-04-13

## 2021-04-13 RX ORDER — FENTANYL 12.5 UG/1
12 PATCH TRANSDERMAL
Status: DISCONTINUED | OUTPATIENT
Start: 2021-04-13 | End: 2021-04-15 | Stop reason: HOSPADM

## 2021-04-13 RX ORDER — ONDANSETRON 2 MG/ML
4 INJECTION INTRAMUSCULAR; INTRAVENOUS EVERY 30 MIN PRN
Status: CANCELLED | OUTPATIENT
Start: 2021-04-13

## 2021-04-13 RX ORDER — ACETAMINOPHEN 325 MG/1
650 TABLET ORAL EVERY 4 HOURS PRN
Status: DISCONTINUED | OUTPATIENT
Start: 2021-04-13 | End: 2021-04-15 | Stop reason: HOSPADM

## 2021-04-13 RX ORDER — CEFAZOLIN SODIUM 1 G/50ML
2000 SOLUTION INTRAVENOUS
Status: CANCELLED | OUTPATIENT
Start: 2021-04-13

## 2021-04-13 RX ORDER — IMIQUIMOD 12.5 MG/.25G
CREAM TOPICAL
Status: ON HOLD | COMMUNITY
End: 2022-11-30

## 2021-04-13 RX ORDER — FENTANYL 25 UG/1
25 PATCH TRANSDERMAL
Status: DISCONTINUED | OUTPATIENT
Start: 2021-04-13 | End: 2021-04-15 | Stop reason: HOSPADM

## 2021-04-13 RX ORDER — PRAVASTATIN SODIUM 20 MG
80 TABLET ORAL AT BEDTIME
Status: DISCONTINUED | OUTPATIENT
Start: 2021-04-13 | End: 2021-04-15 | Stop reason: HOSPADM

## 2021-04-13 RX ORDER — BUSPIRONE HYDROCHLORIDE 5 MG/1
5 TABLET ORAL DAILY PRN
Status: DISCONTINUED | OUTPATIENT
Start: 2021-04-13 | End: 2021-04-13

## 2021-04-13 RX ORDER — LABETALOL HYDROCHLORIDE 5 MG/ML
10 INJECTION, SOLUTION INTRAVENOUS
Status: CANCELLED | OUTPATIENT
Start: 2021-04-13

## 2021-04-13 RX ORDER — CYCLOSPORINE 0.5 MG/ML
1 EMULSION OPHTHALMIC 2 TIMES DAILY
Status: DISCONTINUED | OUTPATIENT
Start: 2021-04-13 | End: 2021-04-13

## 2021-04-13 RX ORDER — LEVOTHYROXINE SODIUM 75 UG/1
75 TABLET ORAL DAILY
Status: DISCONTINUED | OUTPATIENT
Start: 2021-04-13 | End: 2021-04-15 | Stop reason: HOSPADM

## 2021-04-13 RX ORDER — OXYCODONE HYDROCHLORIDE 15 MG/1
15 TABLET, FILM COATED, EXTENDED RELEASE ORAL 3 TIMES DAILY
Status: DISCONTINUED | OUTPATIENT
Start: 2021-04-13 | End: 2021-04-13

## 2021-04-13 RX ORDER — GABAPENTIN 300 MG/1
900 CAPSULE ORAL 3 TIMES DAILY
Status: DISCONTINUED | OUTPATIENT
Start: 2021-04-13 | End: 2021-04-13

## 2021-04-13 RX ORDER — VENLAFAXINE HYDROCHLORIDE 150 MG/1
150 CAPSULE, EXTENDED RELEASE ORAL
Status: DISCONTINUED | OUTPATIENT
Start: 2021-04-14 | End: 2021-04-15 | Stop reason: HOSPADM

## 2021-04-13 RX ORDER — VANCOMYCIN HYDROCHLORIDE 1 G/200ML
1000 INJECTION, SOLUTION INTRAVENOUS
Status: COMPLETED | OUTPATIENT
Start: 2021-04-13 | End: 2021-04-13

## 2021-04-13 RX ORDER — PRAVASTATIN SODIUM 20 MG
80 TABLET ORAL AT BEDTIME
Status: DISCONTINUED | OUTPATIENT
Start: 2021-04-13 | End: 2021-04-13

## 2021-04-13 RX ORDER — LURASIDONE HYDROCHLORIDE 80 MG/1
80 TABLET, FILM COATED ORAL AT BEDTIME
Status: DISCONTINUED | OUTPATIENT
Start: 2021-04-13 | End: 2021-04-13

## 2021-04-13 RX ORDER — SUMATRIPTAN 50 MG/1
50 TABLET, FILM COATED ORAL ONCE
Status: COMPLETED | OUTPATIENT
Start: 2021-04-13 | End: 2021-04-13

## 2021-04-13 RX ORDER — LORAZEPAM 0.5 MG/1
0.5 TABLET ORAL EVERY 4 HOURS PRN
Status: DISCONTINUED | OUTPATIENT
Start: 2021-04-13 | End: 2021-04-15 | Stop reason: HOSPADM

## 2021-04-13 RX ORDER — POLYETHYLENE GLYCOL 3350 17 G/17G
17 POWDER, FOR SOLUTION ORAL DAILY
Status: DISCONTINUED | OUTPATIENT
Start: 2021-04-13 | End: 2021-04-13

## 2021-04-13 RX ORDER — HYDROXYZINE HYDROCHLORIDE 25 MG/1
25 TABLET, FILM COATED ORAL EVERY 4 HOURS PRN
COMMUNITY
End: 2021-05-13

## 2021-04-13 RX ORDER — PROPOFOL 10 MG/ML
INJECTION, EMULSION INTRAVENOUS CONTINUOUS PRN
Status: DISCONTINUED | OUTPATIENT
Start: 2021-04-13 | End: 2021-04-13

## 2021-04-13 RX ORDER — POLYVINYL ALCOHOL 14 MG/ML
1 SOLUTION/ DROPS OPHTHALMIC 3 TIMES DAILY PRN
COMMUNITY

## 2021-04-13 RX ORDER — MIRABEGRON 25 MG/1
25 TABLET, FILM COATED, EXTENDED RELEASE ORAL EVERY EVENING
Status: DISCONTINUED | OUTPATIENT
Start: 2021-04-13 | End: 2021-04-13

## 2021-04-13 RX ORDER — DILTIAZEM HCL 60 MG
60 TABLET ORAL 3 TIMES DAILY
Status: DISCONTINUED | OUTPATIENT
Start: 2021-04-13 | End: 2021-04-13

## 2021-04-13 RX ORDER — NICOTINE POLACRILEX 4 MG
15-30 LOZENGE BUCCAL
Status: DISCONTINUED | OUTPATIENT
Start: 2021-04-13 | End: 2021-04-13

## 2021-04-13 RX ORDER — DULOXETIN HYDROCHLORIDE 60 MG/1
120 CAPSULE, DELAYED RELEASE ORAL EVERY EVENING
Status: DISCONTINUED | OUTPATIENT
Start: 2021-04-13 | End: 2021-04-13

## 2021-04-13 RX ORDER — LIDOCAINE 4 G/G
2 PATCH TOPICAL EVERY 12 HOURS PRN
Status: DISCONTINUED | OUTPATIENT
Start: 2021-04-13 | End: 2021-04-15 | Stop reason: HOSPADM

## 2021-04-13 RX ORDER — FENTANYL 12.5 UG/1
1 PATCH TRANSDERMAL
COMMUNITY
End: 2022-11-21

## 2021-04-13 RX ORDER — LEVOTHYROXINE SODIUM 75 UG/1
75 TABLET ORAL DAILY
Status: DISCONTINUED | OUTPATIENT
Start: 2021-04-13 | End: 2021-04-13

## 2021-04-13 RX ORDER — METHYLPREDNISOLONE 8 MG/1
8 TABLET ORAL DAILY
Status: DISCONTINUED | OUTPATIENT
Start: 2021-04-13 | End: 2021-04-15 | Stop reason: HOSPADM

## 2021-04-13 RX ORDER — LIDOCAINE 4 G/G
2 PATCH TOPICAL EVERY 12 HOURS PRN
Status: DISCONTINUED | OUTPATIENT
Start: 2021-04-13 | End: 2021-04-13

## 2021-04-13 RX ORDER — CLINDAMYCIN PHOSPHATE 900 MG/50ML
900 INJECTION, SOLUTION INTRAVENOUS
Status: CANCELLED | OUTPATIENT
Start: 2021-04-13

## 2021-04-13 RX ORDER — BUPROPION HYDROCHLORIDE 300 MG/1
300 TABLET ORAL EVERY MORNING
Status: DISCONTINUED | OUTPATIENT
Start: 2021-04-13 | End: 2021-04-15 | Stop reason: HOSPADM

## 2021-04-13 RX ORDER — METHYLPREDNISOLONE 8 MG/1
8 TABLET ORAL DAILY
Status: DISCONTINUED | OUTPATIENT
Start: 2021-04-13 | End: 2021-04-13

## 2021-04-13 RX ORDER — FENTANYL 25 UG/1
1 PATCH TRANSDERMAL
COMMUNITY
End: 2022-11-21

## 2021-04-13 RX ORDER — HYDROMORPHONE HYDROCHLORIDE 1 MG/ML
.3-.5 INJECTION, SOLUTION INTRAMUSCULAR; INTRAVENOUS; SUBCUTANEOUS EVERY 5 MIN PRN
Status: CANCELLED | OUTPATIENT
Start: 2021-04-13

## 2021-04-13 RX ORDER — LURASIDONE HYDROCHLORIDE 20 MG/1
20 TABLET, FILM COATED ORAL AT BEDTIME
Status: DISCONTINUED | OUTPATIENT
Start: 2021-04-13 | End: 2021-04-15 | Stop reason: HOSPADM

## 2021-04-13 RX ORDER — ONDANSETRON 2 MG/ML
INJECTION INTRAMUSCULAR; INTRAVENOUS PRN
Status: DISCONTINUED | OUTPATIENT
Start: 2021-04-13 | End: 2021-04-13

## 2021-04-13 RX ORDER — ALBUTEROL SULFATE 90 UG/1
2 AEROSOL, METERED RESPIRATORY (INHALATION) 3 TIMES DAILY PRN
Status: DISCONTINUED | OUTPATIENT
Start: 2021-04-13 | End: 2021-04-13

## 2021-04-13 RX ORDER — METHYLPREDNISOLONE 4 MG/1
4 TABLET ORAL DAILY
Status: DISCONTINUED | OUTPATIENT
Start: 2021-04-13 | End: 2021-04-13

## 2021-04-13 RX ORDER — PROPOFOL 10 MG/ML
INJECTION, EMULSION INTRAVENOUS PRN
Status: DISCONTINUED | OUTPATIENT
Start: 2021-04-13 | End: 2021-04-13

## 2021-04-13 RX ORDER — CHLORAL HYDRATE 500 MG
2 CAPSULE ORAL 2 TIMES DAILY
COMMUNITY

## 2021-04-13 RX ORDER — CALCITONIN SALMON 200 [IU]/.09ML
1 SPRAY, METERED NASAL DAILY
COMMUNITY

## 2021-04-13 RX ORDER — DEXTROSE MONOHYDRATE 25 G/50ML
25-50 INJECTION, SOLUTION INTRAVENOUS
Status: DISCONTINUED | OUTPATIENT
Start: 2021-04-13 | End: 2021-04-13

## 2021-04-13 RX ORDER — AMOXICILLIN 250 MG
1-2 CAPSULE ORAL 2 TIMES DAILY
COMMUNITY
End: 2022-11-18

## 2021-04-13 RX ORDER — NICOTINE POLACRILEX 4 MG
15-30 LOZENGE BUCCAL
Status: DISCONTINUED | OUTPATIENT
Start: 2021-04-13 | End: 2021-04-15 | Stop reason: HOSPADM

## 2021-04-13 RX ORDER — MORPHINE SULFATE 15 MG/1
15 TABLET ORAL 3 TIMES DAILY PRN
Status: DISCONTINUED | OUTPATIENT
Start: 2021-04-13 | End: 2021-04-15 | Stop reason: HOSPADM

## 2021-04-13 RX ORDER — FENTANYL CITRATE 50 UG/ML
25-50 INJECTION, SOLUTION INTRAMUSCULAR; INTRAVENOUS
Status: CANCELLED | OUTPATIENT
Start: 2021-04-13

## 2021-04-13 RX ORDER — ALBUMIN HUMAN 5 %
INTRAVENOUS SOLUTION INTRAVENOUS PRN
Status: DISCONTINUED | OUTPATIENT
Start: 2021-04-13 | End: 2021-04-13

## 2021-04-13 RX ORDER — LEFLUNOMIDE 20 MG/1
20 TABLET ORAL DAILY
Status: DISCONTINUED | OUTPATIENT
Start: 2021-04-13 | End: 2021-04-15 | Stop reason: HOSPADM

## 2021-04-13 RX ORDER — SODIUM CHLORIDE, SODIUM LACTATE, POTASSIUM CHLORIDE, CALCIUM CHLORIDE 600; 310; 30; 20 MG/100ML; MG/100ML; MG/100ML; MG/100ML
INJECTION, SOLUTION INTRAVENOUS CONTINUOUS PRN
Status: DISCONTINUED | OUTPATIENT
Start: 2021-04-13 | End: 2021-04-13

## 2021-04-13 RX ORDER — SODIUM CHLORIDE, SODIUM LACTATE, POTASSIUM CHLORIDE, CALCIUM CHLORIDE 600; 310; 30; 20 MG/100ML; MG/100ML; MG/100ML; MG/100ML
INJECTION, SOLUTION INTRAVENOUS CONTINUOUS
Status: CANCELLED | OUTPATIENT
Start: 2021-04-13

## 2021-04-13 RX ORDER — AMOXICILLIN 250 MG
2 CAPSULE ORAL 2 TIMES DAILY
Status: DISCONTINUED | OUTPATIENT
Start: 2021-04-13 | End: 2021-04-15 | Stop reason: HOSPADM

## 2021-04-13 RX ORDER — AMOXICILLIN 250 MG
2 CAPSULE ORAL 2 TIMES DAILY
Status: DISCONTINUED | OUTPATIENT
Start: 2021-04-13 | End: 2021-04-13

## 2021-04-13 RX ORDER — MODAFINIL 200 MG/1
200 TABLET ORAL EVERY MORNING
Status: DISCONTINUED | OUTPATIENT
Start: 2021-04-13 | End: 2021-04-15 | Stop reason: HOSPADM

## 2021-04-13 RX ORDER — TOPIRAMATE 25 MG/1
50 TABLET, FILM COATED ORAL 2 TIMES DAILY
Status: DISCONTINUED | OUTPATIENT
Start: 2021-04-13 | End: 2021-04-15 | Stop reason: HOSPADM

## 2021-04-13 RX ORDER — TRAZODONE HYDROCHLORIDE 100 MG/1
100 TABLET ORAL AT BEDTIME
Status: DISCONTINUED | OUTPATIENT
Start: 2021-04-13 | End: 2021-04-13

## 2021-04-13 RX ORDER — ACETAMINOPHEN 325 MG/1
325-650 TABLET ORAL EVERY 4 HOURS PRN
Status: ON HOLD | COMMUNITY
End: 2022-12-01

## 2021-04-13 RX ORDER — ONDANSETRON 4 MG/1
4 TABLET, ORALLY DISINTEGRATING ORAL EVERY 6 HOURS PRN
Status: DISCONTINUED | OUTPATIENT
Start: 2021-04-13 | End: 2021-04-15 | Stop reason: HOSPADM

## 2021-04-13 RX ORDER — CYCLOSPORINE 0.5 MG/ML
1 EMULSION OPHTHALMIC 2 TIMES DAILY
Status: DISCONTINUED | OUTPATIENT
Start: 2021-04-13 | End: 2021-04-15 | Stop reason: HOSPADM

## 2021-04-13 RX ORDER — VENLAFAXINE HYDROCHLORIDE 150 MG/1
150 CAPSULE, EXTENDED RELEASE ORAL DAILY
Status: DISCONTINUED | OUTPATIENT
Start: 2021-04-13 | End: 2021-04-13

## 2021-04-13 RX ORDER — FENTANYL 25 UG/1
25 PATCH TRANSDERMAL
Status: DISCONTINUED | OUTPATIENT
Start: 2021-04-13 | End: 2021-04-13

## 2021-04-13 RX ORDER — FENTANYL 12.5 UG/1
12 PATCH TRANSDERMAL
Status: DISCONTINUED | OUTPATIENT
Start: 2021-04-13 | End: 2021-04-13

## 2021-04-13 RX ORDER — LIDOCAINE HYDROCHLORIDE 20 MG/ML
INJECTION, SOLUTION INFILTRATION; PERINEURAL PRN
Status: DISCONTINUED | OUTPATIENT
Start: 2021-04-13 | End: 2021-04-13

## 2021-04-13 RX ORDER — ONDANSETRON 4 MG/1
4 TABLET, ORALLY DISINTEGRATING ORAL EVERY 30 MIN PRN
Status: CANCELLED | OUTPATIENT
Start: 2021-04-13

## 2021-04-13 RX ORDER — FUROSEMIDE 20 MG
20 TABLET ORAL EVERY OTHER DAY
Status: DISCONTINUED | OUTPATIENT
Start: 2021-04-13 | End: 2021-04-13

## 2021-04-13 RX ORDER — VARENICLINE TARTRATE 1 MG/1
1 TABLET, FILM COATED ORAL 2 TIMES DAILY WITH MEALS
Status: DISCONTINUED | OUTPATIENT
Start: 2021-04-13 | End: 2021-04-15 | Stop reason: HOSPADM

## 2021-04-13 RX ORDER — ONDANSETRON 2 MG/ML
4 INJECTION INTRAMUSCULAR; INTRAVENOUS EVERY 6 HOURS PRN
Status: DISCONTINUED | OUTPATIENT
Start: 2021-04-13 | End: 2021-04-15 | Stop reason: HOSPADM

## 2021-04-13 RX ORDER — FERROUS SULFATE 325(65) MG
325 TABLET ORAL
COMMUNITY

## 2021-04-13 RX ORDER — TOPIRAMATE 25 MG/1
50 TABLET, FILM COATED ORAL 2 TIMES DAILY
Status: DISCONTINUED | OUTPATIENT
Start: 2021-04-13 | End: 2021-04-13

## 2021-04-13 RX ORDER — DICLOFENAC SODIUM 3% / HYALURONIC ACID SODIUM SALT 2% / NIACINAMIDE 4% 3 G/100G
1-4 GEL TOPICAL 4 TIMES DAILY PRN
COMMUNITY
End: 2021-05-13

## 2021-04-13 RX ORDER — FLUCONAZOLE 200 MG/1
200 TABLET ORAL DAILY PRN
Status: ON HOLD | COMMUNITY
End: 2022-11-30

## 2021-04-13 RX ORDER — ONDANSETRON 2 MG/ML
4 INJECTION INTRAMUSCULAR; INTRAVENOUS EVERY 6 HOURS PRN
Status: DISCONTINUED | OUTPATIENT
Start: 2021-04-13 | End: 2021-04-13

## 2021-04-13 RX ORDER — BUPROPION HYDROCHLORIDE 150 MG/1
150 TABLET ORAL EVERY MORNING
Status: DISCONTINUED | OUTPATIENT
Start: 2021-04-13 | End: 2021-04-13

## 2021-04-13 RX ORDER — GABAPENTIN 300 MG/1
900 CAPSULE ORAL 3 TIMES DAILY
Status: DISCONTINUED | OUTPATIENT
Start: 2021-04-13 | End: 2021-04-15 | Stop reason: HOSPADM

## 2021-04-13 RX ORDER — POLYETHYLENE GLYCOL 3350 17 G/17G
1 POWDER, FOR SOLUTION ORAL DAILY PRN
Status: ON HOLD | COMMUNITY
End: 2022-11-30

## 2021-04-13 RX ORDER — KETOROLAC TROMETHAMINE 30 MG/ML
INJECTION, SOLUTION INTRAMUSCULAR; INTRAVENOUS PRN
Status: DISCONTINUED | OUTPATIENT
Start: 2021-04-13 | End: 2021-04-13

## 2021-04-13 RX ORDER — LIDOCAINE/PRILOCAINE 2.5 %-2.5%
CREAM (GRAM) TOPICAL PRN
COMMUNITY
End: 2022-11-18

## 2021-04-13 RX ORDER — HYDROXYZINE HYDROCHLORIDE 25 MG/1
25 TABLET, FILM COATED ORAL EVERY 4 HOURS PRN
Status: DISCONTINUED | OUTPATIENT
Start: 2021-04-13 | End: 2021-04-15 | Stop reason: HOSPADM

## 2021-04-13 RX ORDER — ONDANSETRON 4 MG/1
4 TABLET, ORALLY DISINTEGRATING ORAL EVERY 6 HOURS PRN
Status: DISCONTINUED | OUTPATIENT
Start: 2021-04-13 | End: 2021-04-13

## 2021-04-13 RX ORDER — ATROPA BELLADONNA AND OPIUM 16.2; 3 MG/1; MG/1
30 SUPPOSITORY RECTAL
Status: DISCONTINUED | OUTPATIENT
Start: 2021-04-13 | End: 2021-04-15 | Stop reason: HOSPADM

## 2021-04-13 RX ORDER — ATROPA BELLADONNA AND OPIUM 16.2; 3 MG/1; MG/1
30 SUPPOSITORY RECTAL
Status: DISCONTINUED | OUTPATIENT
Start: 2021-04-13 | End: 2021-04-13

## 2021-04-13 RX ORDER — DOXYCYCLINE HYCLATE 50 MG/1
50 CAPSULE ORAL EVERY EVENING
COMMUNITY
End: 2022-11-18

## 2021-04-13 RX ORDER — DEXTROSE MONOHYDRATE 25 G/50ML
25-50 INJECTION, SOLUTION INTRAVENOUS
Status: DISCONTINUED | OUTPATIENT
Start: 2021-04-13 | End: 2021-04-15 | Stop reason: HOSPADM

## 2021-04-13 RX ORDER — PHENTERMINE HYDROCHLORIDE 37.5 MG/1
18.75 TABLET ORAL DAILY
Status: DISCONTINUED | OUTPATIENT
Start: 2021-04-13 | End: 2021-04-13

## 2021-04-13 RX ORDER — MORPHINE SULFATE 15 MG/1
15 TABLET ORAL 3 TIMES DAILY PRN
Status: DISCONTINUED | OUTPATIENT
Start: 2021-04-13 | End: 2021-04-13

## 2021-04-13 RX ORDER — CLINDAMYCIN PHOSPHATE 900 MG/50ML
900 INJECTION, SOLUTION INTRAVENOUS SEE ADMIN INSTRUCTIONS
Status: CANCELLED | OUTPATIENT
Start: 2021-04-13

## 2021-04-13 RX ORDER — MODAFINIL 200 MG/1
200 TABLET ORAL EVERY MORNING
Status: DISCONTINUED | OUTPATIENT
Start: 2021-04-13 | End: 2021-04-13

## 2021-04-13 RX ORDER — PRAVASTATIN SODIUM 80 MG/1
80 TABLET ORAL AT BEDTIME
COMMUNITY

## 2021-04-13 RX ORDER — TAMSULOSIN HYDROCHLORIDE 0.4 MG/1
0.4 CAPSULE ORAL DAILY
Status: DISCONTINUED | OUTPATIENT
Start: 2021-04-13 | End: 2021-04-13

## 2021-04-13 RX ORDER — VENLAFAXINE HYDROCHLORIDE 150 MG/1
150 CAPSULE, EXTENDED RELEASE ORAL DAILY
COMMUNITY

## 2021-04-13 RX ORDER — AMOXICILLIN 250 MG
1 CAPSULE ORAL 2 TIMES DAILY
Status: DISCONTINUED | OUTPATIENT
Start: 2021-04-13 | End: 2021-04-13

## 2021-04-13 RX ORDER — POLYETHYLENE GLYCOL 3350 17 G/17G
17 POWDER, FOR SOLUTION ORAL DAILY
Status: DISCONTINUED | OUTPATIENT
Start: 2021-04-13 | End: 2021-04-15 | Stop reason: HOSPADM

## 2021-04-13 RX ORDER — FENTANYL CITRATE 50 UG/ML
INJECTION, SOLUTION INTRAMUSCULAR; INTRAVENOUS PRN
Status: DISCONTINUED | OUTPATIENT
Start: 2021-04-13 | End: 2021-04-13

## 2021-04-13 RX ORDER — FUROSEMIDE 20 MG
20 TABLET ORAL EVERY OTHER DAY
COMMUNITY
End: 2021-05-13

## 2021-04-13 RX ORDER — OXYCODONE HYDROCHLORIDE 5 MG/1
5-10 TABLET ORAL
Status: DISCONTINUED | OUTPATIENT
Start: 2021-04-13 | End: 2021-04-13

## 2021-04-13 RX ORDER — BUPROPION HYDROCHLORIDE 300 MG/1
300 TABLET ORAL EVERY MORNING
Status: DISCONTINUED | OUTPATIENT
Start: 2021-04-13 | End: 2021-04-13

## 2021-04-13 RX ORDER — LURASIDONE HYDROCHLORIDE 20 MG/1
20 TABLET, FILM COATED ORAL AT BEDTIME
COMMUNITY

## 2021-04-13 RX ORDER — FLUTICASONE PROPIONATE 50 MCG
1 SPRAY, SUSPENSION (ML) NASAL 2 TIMES DAILY
Status: DISCONTINUED | OUTPATIENT
Start: 2021-04-13 | End: 2021-04-15 | Stop reason: HOSPADM

## 2021-04-13 RX ORDER — ACETAMINOPHEN 325 MG/1
650 TABLET ORAL EVERY 4 HOURS PRN
Status: DISCONTINUED | OUTPATIENT
Start: 2021-04-13 | End: 2021-04-13

## 2021-04-13 RX ORDER — DILTIAZEM HCL 60 MG
60 TABLET ORAL 3 TIMES DAILY
Status: DISCONTINUED | OUTPATIENT
Start: 2021-04-13 | End: 2021-04-15 | Stop reason: HOSPADM

## 2021-04-13 RX ADMIN — DILTIAZEM HYDROCHLORIDE 60 MG: 60 TABLET, FILM COATED ORAL at 22:01

## 2021-04-13 RX ADMIN — TRAZODONE HYDROCHLORIDE 100 MG: 100 TABLET ORAL at 22:02

## 2021-04-13 RX ADMIN — PHENYLEPHRINE HYDROCHLORIDE 100 MCG: 10 INJECTION INTRAVENOUS at 04:43

## 2021-04-13 RX ADMIN — PROPOFOL 100 MCG/KG/MIN: 10 INJECTION, EMULSION INTRAVENOUS at 04:31

## 2021-04-13 RX ADMIN — Medication 125 ML: at 05:06

## 2021-04-13 RX ADMIN — KETOROLAC TROMETHAMINE 30 MG: 30 INJECTION, SOLUTION INTRAMUSCULAR; INTRAVENOUS at 16:39

## 2021-04-13 RX ADMIN — SODIUM CHLORIDE: 9 INJECTION, SOLUTION INTRAVENOUS at 03:29

## 2021-04-13 RX ADMIN — CYCLOSPORINE 1 DROP: 0.5 EMULSION OPHTHALMIC at 09:42

## 2021-04-13 RX ADMIN — SODIUM CHLORIDE 1000 ML: 9 INJECTION, SOLUTION INTRAVENOUS at 19:59

## 2021-04-13 RX ADMIN — OMEPRAZOLE 20 MG: 20 CAPSULE, DELAYED RELEASE ORAL at 09:18

## 2021-04-13 RX ADMIN — SUMATRIPTAN SUCCINATE 50 MG: 50 TABLET ORAL at 14:31

## 2021-04-13 RX ADMIN — BUPROPION HYDROCHLORIDE 300 MG: 300 TABLET, FILM COATED, EXTENDED RELEASE ORAL at 09:22

## 2021-04-13 RX ADMIN — DOCUSATE SODIUM 50MG AND SENNOSIDES 8.6MG 2 TABLET: 8.6; 5 TABLET, FILM COATED ORAL at 22:01

## 2021-04-13 RX ADMIN — DOCUSATE SODIUM AND SENNOSIDES 1 TABLET: 8.6; 5 TABLET ORAL at 09:14

## 2021-04-13 RX ADMIN — GABAPENTIN 900 MG: 300 CAPSULE ORAL at 22:01

## 2021-04-13 RX ADMIN — PHENYLEPHRINE HYDROCHLORIDE 0.2 MCG/KG/MIN: 10 INJECTION INTRAVENOUS at 04:43

## 2021-04-13 RX ADMIN — PHENYLEPHRINE HYDROCHLORIDE 150 MCG: 10 INJECTION INTRAVENOUS at 04:46

## 2021-04-13 RX ADMIN — PROPOFOL 50 MG: 10 INJECTION, EMULSION INTRAVENOUS at 04:31

## 2021-04-13 RX ADMIN — FENTANYL 1 PATCH: 12 PATCH TRANSDERMAL at 12:23

## 2021-04-13 RX ADMIN — METHYLPREDNISOLONE 8 MG: 8 TABLET ORAL at 12:44

## 2021-04-13 RX ADMIN — OMEPRAZOLE 20 MG: 20 CAPSULE, DELAYED RELEASE ORAL at 16:42

## 2021-04-13 RX ADMIN — LEFLUNOMIDE 20 MG: 20 TABLET ORAL at 12:45

## 2021-04-13 RX ADMIN — LURASIDONE HYDROCHLORIDE 20 MG: 20 TABLET, FILM COATED ORAL at 22:02

## 2021-04-13 RX ADMIN — PHENYLEPHRINE HYDROCHLORIDE 100 MCG: 10 INJECTION INTRAVENOUS at 04:57

## 2021-04-13 RX ADMIN — LIDOCAINE HYDROCHLORIDE 100 MG: 20 INJECTION, SOLUTION INFILTRATION; PERINEURAL at 04:31

## 2021-04-13 RX ADMIN — MORPHINE SULFATE 15 MG: 15 TABLET ORAL at 09:11

## 2021-04-13 RX ADMIN — CEFEPIME HYDROCHLORIDE 2 G: 2 INJECTION, POWDER, FOR SOLUTION INTRAVENOUS at 23:15

## 2021-04-13 RX ADMIN — CYCLOBENZAPRINE 10 MG: 10 TABLET, FILM COATED ORAL at 09:14

## 2021-04-13 RX ADMIN — DILTIAZEM HYDROCHLORIDE 60 MG: 60 TABLET, FILM COATED ORAL at 13:27

## 2021-04-13 RX ADMIN — FLUTICASONE PROPIONATE 1 SPRAY: 50 SPRAY, METERED NASAL at 12:27

## 2021-04-13 RX ADMIN — VARENICLINE TARTRATE 1 MG: 1 TABLET, FILM COATED ORAL at 22:00

## 2021-04-13 RX ADMIN — ONDANSETRON 4 MG: 2 INJECTION INTRAMUSCULAR; INTRAVENOUS at 04:52

## 2021-04-13 RX ADMIN — PHENYLEPHRINE HYDROCHLORIDE 150 MCG: 10 INJECTION INTRAVENOUS at 05:02

## 2021-04-13 RX ADMIN — GABAPENTIN 900 MG: 300 CAPSULE ORAL at 13:27

## 2021-04-13 RX ADMIN — VANCOMYCIN HYDROCHLORIDE 1 G: 1 INJECTION, SOLUTION INTRAVENOUS at 04:48

## 2021-04-13 RX ADMIN — FENTANYL 1 PATCH: 25 PATCH TRANSDERMAL at 12:23

## 2021-04-13 RX ADMIN — TOPIRAMATE 50 MG: 25 TABLET, FILM COATED ORAL at 12:27

## 2021-04-13 RX ADMIN — MIDAZOLAM 2 MG: 1 INJECTION INTRAMUSCULAR; INTRAVENOUS at 04:21

## 2021-04-13 RX ADMIN — GABAPENTIN 900 MG: 300 CAPSULE ORAL at 09:18

## 2021-04-13 RX ADMIN — MODAFINIL 200 MG: 200 TABLET ORAL at 09:38

## 2021-04-13 RX ADMIN — ACETAMINOPHEN 650 MG: 325 TABLET, FILM COATED ORAL at 03:24

## 2021-04-13 RX ADMIN — HYDROXYZINE HYDROCHLORIDE 25 MG: 25 TABLET, FILM COATED ORAL at 13:27

## 2021-04-13 RX ADMIN — MIRABEGRON 25 MG: 25 TABLET, FILM COATED, EXTENDED RELEASE ORAL at 22:00

## 2021-04-13 RX ADMIN — TOPIRAMATE 50 MG: 25 TABLET, FILM COATED ORAL at 22:01

## 2021-04-13 RX ADMIN — OXYCODONE HYDROCHLORIDE 10 MG: 5 TABLET ORAL at 03:24

## 2021-04-13 RX ADMIN — CYCLOSPORINE 1 DROP: 0.5 EMULSION OPHTHALMIC at 20:06

## 2021-04-13 RX ADMIN — KETOROLAC TROMETHAMINE 15 MG: 30 INJECTION, SOLUTION INTRAMUSCULAR at 04:55

## 2021-04-13 RX ADMIN — SODIUM CHLORIDE, POTASSIUM CHLORIDE, SODIUM LACTATE AND CALCIUM CHLORIDE: 600; 310; 30; 20 INJECTION, SOLUTION INTRAVENOUS at 04:24

## 2021-04-13 RX ADMIN — LEVOTHYROXINE SODIUM 75 MCG: 75 TABLET ORAL at 09:19

## 2021-04-13 RX ADMIN — ACETAMINOPHEN 650 MG: 325 TABLET, FILM COATED ORAL at 08:04

## 2021-04-13 RX ADMIN — ACETAMINOPHEN 650 MG: 325 TABLET, FILM COATED ORAL at 13:32

## 2021-04-13 RX ADMIN — FENTANYL CITRATE 25 MCG: 50 INJECTION, SOLUTION INTRAMUSCULAR; INTRAVENOUS at 04:43

## 2021-04-13 RX ADMIN — PHENYLEPHRINE HYDROCHLORIDE 100 MCG: 10 INJECTION INTRAVENOUS at 05:01

## 2021-04-13 RX ADMIN — CEFEPIME HYDROCHLORIDE 2 G: 2 INJECTION, POWDER, FOR SOLUTION INTRAVENOUS at 16:35

## 2021-04-13 RX ADMIN — PHENYLEPHRINE HYDROCHLORIDE 100 MCG: 10 INJECTION INTRAVENOUS at 04:55

## 2021-04-13 RX ADMIN — PRAVASTATIN SODIUM 80 MG: 20 TABLET ORAL at 22:02

## 2021-04-13 ASSESSMENT — LIFESTYLE VARIABLES: TOBACCO_USE: 1

## 2021-04-13 ASSESSMENT — COPD QUESTIONNAIRES: COPD: 1

## 2021-04-13 NOTE — PROGRESS NOTES
"Pt noted crying with pain described as \"throbbing, aching headache\" MD Johnson called and rounded on the pt. Imitrex ordered for headache.     1843 pt clammy, lethargic, VSS, , moon lighter called, and notified. Bolus fluid , and  Blood culture ordered.    "

## 2021-04-13 NOTE — ANESTHESIA PREPROCEDURE EVALUATION
Anesthesia Pre-Procedure Evaluation    Patient: Swathi Gibbons   MRN: [id number] : 1965        Preoperative Diagnosis: Left ureteral stone [N20.1]   Procedure : Procedure(s):  CYSTOSCOPY, WITH RETROGRADE PYELOGRAM AND URETERAL STENT INSERTION     Past Medical History:   Diagnosis Date     Alcohol use disorder, severe, in sustained remission (H)      Anxiety and depression      Arthritis     PSORIATIC     Chronic rhinitis      Chronic rhinitis      COPD (chronic obstructive pulmonary disease) (H)      Diabetes (H)     Type II     Esophageal dysmotility      EtOH dependence (H)      Gastroesophageal reflux disease      Hypertension      Hypothyroidism      Latent tuberculosis      MRSA (methicillin resistant staph aureus) culture positive      Nodular goiter      NABOR (obstructive sleep apnea)     C-PAP     Other chronic pain     back pain     Psoriasis      Secondary hyperparathyroidism (H)      Tracheostomy in place (H)      Uncomplicated asthma      Vitamin D deficiency       Past Surgical History:   Procedure Laterality Date     BACK SURGERY      L4-5 Decompression and Fusion     BREAST SURGERY      Right breast biopsy, benign     ENT SURGERY      Tonsillectomy     EYE SURGERY      Lasik     GI SURGERY      Bladder Suspension     GYN SURGERY       Section     GYN SURGERY      Tubal Ligation     INSERT STIMULATOR DORSAL COLUMN N/A 2019    Procedure: INSERTION, SPINAL CORD STIMULATOR, DORSAL COLUMN ([employer]);  Surgeon: Reginald Pandey MD;  Location:  OR     INSERT STIMULATOR DORSAL COLUMN TRIAL N/A 2019    Procedure: SPINAL CORD STIMULATOR  TRIAL;  Surgeon: Reginald Pandey MD;  Location:  OR     ORTHOPEDIC SURGERY      ORIF Ankle Left      ORTHOPEDIC SURGERY      Bunionectomy [facility].      Allergies   Allergen Reactions     Avelox [Moxifloxacin]      Cefaclor      Sulfa Drugs       Social History     Tobacco Use     Smoking status: Former Smoker     Packs/day: 0.50     Types:  Cigarettes     Quit date: 2/5/2018     Years since quitting: 3.1     Smokeless tobacco: Never Used   Substance Use Topics     Alcohol use: No     Frequency: Never      Wt Readings from Last 1 Encounters:   05/09/19 126.4 kg (278 lb 9.6 oz)        Anesthesia Evaluation   Pt has had prior anesthetic. Type: General.    No history of anesthetic complications       ROS/MED HX  ENT/Pulmonary:     (+) sleep apnea, tobacco use, Past use, asthma COPD,     Neurologic:       Cardiovascular:     (+) hypertension-----    METS/Exercise Tolerance:     Hematologic:       Musculoskeletal:       GI/Hepatic:     (+) GERD,     Renal/Genitourinary:       Endo:     (+) type II DM, Obesity,     Psychiatric/Substance Use:     (+) psychiatric history anxiety and depression alcohol abuse     Infectious Disease:       Malignancy:       Other:            Physical Exam    Airway        Mallampati: III   TM distance: > 3 FB   Neck ROM: full   Mouth opening: > 3 cm    Respiratory Devices and Support         Dental         B=Bridge, C=Chipped, L=Loose, M=Missing    Cardiovascular   cardiovascular exam normal          Pulmonary   pulmonary exam normal                OUTSIDE LABS:  CBC:   Lab Results   Component Value Date    WBC 6.5 09/14/2007    WBC 3.9 (L) 01/02/2007    HGB 15.2 09/14/2007    HGB 14.0 01/02/2007    HCT 43.6 09/14/2007    HCT 41.0 01/02/2007     09/14/2007     01/02/2007     BMP:   Lab Results   Component Value Date     (H) 09/14/2007     01/02/2007    POTASSIUM 3.7 09/14/2007    POTASSIUM 4.2 01/02/2007    CHLORIDE 105 09/14/2007    CHLORIDE 100 01/02/2007    CO2 27 09/14/2007    CO2 32 01/02/2007    BUN 8 09/14/2007    BUN 9 01/02/2007    CR 0.61 09/14/2007    CR 0.70 01/02/2007    GLC 96 09/16/2007     (H) 09/14/2007     COAGS: No results found for: PTT, INR, FIBR  POC:   Lab Results   Component Value Date    BGM 99 04/04/2019     HEPATIC:   Lab Results   Component Value Date    ALBUMIN 4.4  09/14/2007    PROTTOTAL 7.8 09/14/2007    ALT 11 09/14/2007    AST 26 09/14/2007    GGT 30 09/14/2007    ALKPHOS 121 09/14/2007    BILITOTAL 0.4 09/14/2007     OTHER:   Lab Results   Component Value Date    LACT 1.7 04/13/2021    EVELIA 9.0 09/14/2007    PHOS 3.5 09/14/2007    MAG 2.1 09/14/2007    LIPASE 50 09/15/2007    TSH 2.73 09/14/2007       Anesthesia Plan    ASA Status:  3, emergent    NPO Status:  NPO Appropriate    Anesthesia Type: MAC.   Induction: N/a.   Maintenance: TIVA.        Consents    Anesthesia Plan(s) and associated risks, benefits, and realistic alternatives discussed. Questions answered and patient/representative(s) expressed understanding.     - Discussed with:  Patient      - Extended Intubation/Ventilatory Support Discussed: No.      - Patient is DNR/DNI Status: No    Use of blood products discussed: No .     Postoperative Care    Pain management: IV analgesics, Oral pain medications.   PONV prophylaxis: Ondansetron (or other 5HT-3), Background Propofol Infusion, Dexamethasone or Solumedrol     Comments:                Papa Ramos MD

## 2021-04-13 NOTE — CONSULTS
"[facility]  Consult Note - Hospitalist Service     Date of Admission:  4/13/2021  Consult Requested by: Urology service  Reason for Consult: Medical co-management    Assessment & Plan   Swathi Gibbons is a 55 year old female admitted on 4/13/2021. She has a history of psoriatic arthritis, asthma, hx of alcohol abuse, NABOR, GERD, hypothyroidism, depression, anxiety, depression, anxiety, DMII, and chronic pain who was admitted to [facility] due to obstruction L ureteral stones, infected UA, and fever s/p urgent L ureteral stent placement. IM asked to follow for medical co-management.     # Obstructing L ureteral stone s/p urgent L ureteral stent placement  # UTI  Patient presented with 2 days of hematuria and abdominal pain. CT revealed two L ureteral stones with hydronephrosis. UA nitrite and LE positive. WBC 8.2. CRP 14.4, procal 1.99. Tmax 101. S/p L ureteral stent placement 4/13 by Urology without complication.   - Urology primary  - Continue Gentamycin/Vancomycin per primary team  - Byrd drain in place  - Pain management as below    Chronic pain with chronic opioid use.  Pt has long hx of chronic pain 2/2 multiple spinal surgeries (L4-L% fusion and L3-L4 TLIF and fusion extension), s/p L shoulder replacement 3/5/21, and RA. Has active spinal cor stimulator placed 05/2019. Follows with [facility]. Currently complains of \"pain all over\" concerning for opioid withdrawal.   - Continue Fentanyl 25 mcg and 12 mcg patches every 3 days  - Continue Gabapentin 900 mg TID  - Continue Morphine 15 mg TID prn  - Avoid escalation of narcotics  - No opioids on discharge  - Follow up with pain clinic for ongoing management and refills    Psoriatic arthritis. On chronic steroids- Methylprednisolone 4 mg daily, Cosentyx 300 mg every 28 days and leflunomide 20 mg daily. Follows with Rheumatology at [facility], last visit 3/6/2020.  - Continue Medrol 4 mg daily  - " "Continue Leflunomide 20 mg daily  - Follow up with Rheumatology outpatient as previously directed    DMII. Most recent Hgb A1C 5.6 on 4/13/21. Managed PTA on metformin 1,000 mg daily.  - Hold metformin while inpatient  - MSSI  - Moderate consistent carbohydrate diet    Hypothyroidism. Continue Levothyroxine 75 mcg daily.     Depression  Anxiety  Managed PTA on bupropion 300 mg qam, venlafaxine 150 mg daily, latuda 20 mg at bedtime, and topiramate 50 mg BID  - Continue PTA regimen    Esophageal dysmotility. Continue Diltiazem 60 mg TID    Asthma  NABOR  Insomnia  - CPAP at night  - Continue Fluticasone, Advair  - Continue modafinil 200 mg qam       The patient's care was discussed with the Attending Physician, Dr. Guerrero.    Sunshine Pabon PA-C  [facility]  Contact information available via [facility] Paging/Directory    ______________________________________________________________________    Chief Complaint   Pain    History is obtained from the patient and chart    History of Present Illness   Swathi Gibbons is a 55 year old female with a complicated medical history including psoriatic arthritis, asthma, hx of alcohol abuse, NABOR, GERD, hypothyroidism, depression, anxiety, DMII, and chronic pain who was admitted to [facility] due to obstruction L ureteral stones, infected UA, and fever s/p urgent L ureteral stent placement.   Pt states that a few days prior to admission she developed abdominal pain. She feels that pain was somewhat muted due to the narcotics that she is already on, but pain did not improve so she presented to the ED. She denies any fevers at home, nausea or vomiting. She does note that she was having urinary frequency, but denies dysuria.   She endorses chronic pain 2/2 multiple spinal surgeries, a recent shoulder surgery and rheumatoid arthritis. She follows with [facility]. She currently complains of \"pain everywhere\" and is " "requesting that her fentanyl patches be placed. When asked about her abdominal pain she states that this has improved since her initial presentation. She rates her abdominal pain as 2/10.   She currently denies any chest pain, shortness of breath or difficulty breathing. She denies fever or chills. She endorses generalized achy pain and discomfort, as well as headache and \"brain fog\". She feels her headache is secondary to withdrawal from being off the fentanyl patches.     Review of Systems   The 10 point Review of Systems is negative other than noted in the HPI or here.     Past Medical History    I have reviewed this patient's medical history and updated it with pertinent information if needed.   Past Medical History:   Diagnosis Date     Alcohol use disorder, severe, in sustained remission (H)      Anxiety and depression      Arthritis     PSORIATIC     Chronic rhinitis      Chronic rhinitis      COPD (chronic obstructive pulmonary disease) (H)      Diabetes (H)     Type II     Esophageal dysmotility      EtOH dependence (H)      Gastroesophageal reflux disease      Hypertension      Hypothyroidism      Latent tuberculosis      MRSA (methicillin resistant staph aureus) culture positive      Nodular goiter      NABOR (obstructive sleep apnea)     C-PAP     Other chronic pain     back pain     Psoriasis      Secondary hyperparathyroidism (H)      Tracheostomy in place (H)      Uncomplicated asthma      Vitamin D deficiency        Past Surgical History   I have reviewed this patient's surgical history and updated it with pertinent information if needed.  Past Surgical History:   Procedure Laterality Date     BACK SURGERY      L4-5 Decompression and Fusion     BREAST SURGERY      Right breast biopsy, benign     ENT SURGERY      Tonsillectomy     EYE SURGERY      Lasik     GI SURGERY      Bladder Suspension     GYN SURGERY       Section     GYN SURGERY      Tubal Ligation     INSERT STIMULATOR DORSAL COLUMN N/A " 5/9/2019    Procedure: INSERTION, SPINAL CORD STIMULATOR, DORSAL COLUMN (MEDTRONIC);  Surgeon: Reginald Pandey MD;  Location:  OR     INSERT STIMULATOR DORSAL COLUMN TRIAL N/A 4/4/2019    Procedure: SPINAL CORD STIMULATOR  TRIAL;  Surgeon: Reginald Pandey MD;  Location:  OR     ORTHOPEDIC SURGERY      ORIF Ankle Left 2017     ORTHOPEDIC SURGERY      Bunionectomy [place].       Social History   I have reviewed this patient's social history and updated it with pertinent information if needed.  Social History     Tobacco Use     Smoking status: Former Smoker     Packs/day: 0.50     Types: Cigarettes     Quit date: 2/5/2018     Years since quitting: 3.1     Smokeless tobacco: Never Used   Substance Use Topics     Alcohol use: No     Frequency: Never     Drug use: No           Medications   I have reviewed this patient's current medications with the assistance of Pharmacy.     Allergies   Allergies   Allergen Reactions     Avelox [Moxifloxacin]      Cefaclor      Sulfa Drugs        Physical Exam   Vital Signs: Temp: 98.9  F (37.2  C) Temp src: Oral BP: (!) 87/46 Pulse: 98   Resp: 21 SpO2: 93 % O2 Device: Nasal cannula Oxygen Delivery: 2 LPM  Weight: 246 lbs 0 oz    General Appearance: Oriented x 3, drowsy, uncomfortable appearing  Respiratory: Lungs CTAB, no wheezing or crackles  Cardiovascular: RRR  GI: Abdomen soft, rotund, mildly tender to palpation in suprapubic region  Skin: warm and dry to touch, no rashes or excoriations  Musculoskeletal: no joint tenderness or swelling  Neurologic: moves all extremities, no focal deficits  Psychiatric: mood appropriate    Data   Results for orders placed or performed during the hospital encounter of 04/13/21 (from the past 24 hour(s))   Asymptomatic SARS-CoV-2 COVID-19 Virus (Coronavirus) by PCR    Specimen: Nasopharyngeal   Result Value Ref Range    SARS-CoV-2 Virus Specimen Source Nasopharyngeal     SARS-CoV-2 PCR Result NEGATIVE     SARS-CoV-2 PCR Comment (Note)     Lactic acid whole blood   Result Value Ref Range    Lactic Acid 1.7 0.7 - 2.0 mmol/L   XR Surgery HARMEET L/T 5 Min Fluoro    Narrative    This exam was marked as non-reportable because it will not be read by a   radiologist or a [place] non-radiologist provider.         Glucose by meter   Result Value Ref Range    Glucose 126 (H) 70 - 99 mg/dL   Hemoglobin A1c   Result Value Ref Range    Hemoglobin A1C 5.6 0 - 5.6 %   Glucose by meter   Result Value Ref Range    Glucose 127 (H) 70 - 99 mg/dL

## 2021-04-13 NOTE — OR NURSING
Pt is an emergent case, pt had low BP in pre-op. Dr Ramos aware. Pt taken to OR. Not all assessments preformed in pre-op. Verbal handoff at bedside with OR Rn and CRNA prior to pt going into OR.

## 2021-04-13 NOTE — PHARMACY-ADMISSION MEDICATION HISTORY
Medication history was completed at [facility]. Please see care everywhere for the original document. The following is the copy from it.     Sol Banegas, [facility] - 04/12/2021 3:56 PM CDT  Formatting of this note is different from the original.  Pharmacy Note - Admission Medication History  Pertinent Provider Information:   ______________________________________________________________________  Prior To Admission (PTA) med list completed and updated in EMR.   PTA Med List   Medication Sig Note Last Dose     acetaminophen (TYLENOL) 325 MG tablet Take 2 tablets (650 mg total) by mouth every 4 (four) hours as needed for pain (mild pain). prn     albuterol (PROAIR HFA;PROVENTIL HFA;VENTOLIN HFA) 90 mcg/actuation inhaler Inhale 2 puffs every 4 (four) hours as needed for wheezing. prn     aspirin 81 MG EC tablet Take 1 tablet (81 mg total) by mouth 2 (two) times a day. 4/10/2021     buPROPion (WELLBUTRIN XL) 300 MG 24 hr tablet Take 300 mg by mouth daily. 4/10/2021     calcitonin, salmon, (MIACALCIN) 200 unit/actuation nasal spray Apply 1 spray into alternating nostrils daily. 4/10/2021     cycloSPORINE (RESTASIS) 0.05 % ophthalmic emulsion Administer 1 drop to both eyes 2 (two) times a day. 4/10/2021     diclofenac-hyaluronate-niacin 3-2-4 % Gel Apply 1-4 g topically 4 (four) times a day as needed. prn     diltiazem (CARDIZEM) 60 MG tablet Take 60 mg by mouth 3 (three) times a day. 4/10/2021     doxycycline (MONODOX) 50 MG capsule Take 50 mg by mouth every evening. 4/10/2021     ergocalciferol (ERGOCALCIFEROL) 1,250 mcg (50,000 unit) capsule Take 50,000 Units by mouth 4 (four) times a week. Past Week at Unknown time     fentaNYL (DURAGESIC) 12 mcg/hr Place 1 patch on the skin every third day. removed in ED     fentaNYL (DURAGESIC) 25 mcg/hr Place 1 patch on the skin every third day. removed in ED     ferrous sulfate 325 (65 FE) MG tablet Take 1 tablet by mouth daily with breakfast. 4/10/2021     fluconazole (DIFLUCAN)  150 MG tablet Take 150 mg by mouth as needed. prn     fluticasone propion-salmeteroL (ADVAIR DISKUS) 100-50 mcg/dose DISKUS Inhale 1 puff 2 (two) times a day. 4/10/2021     fluticasone propionate (FLONASE) 50 mcg/actuation nasal spray Apply 2 sprays into each nostril daily. 4/10/2021     furosemide (LASIX) 20 MG tablet Take 20 mg by mouth daily. 4/12/2021: Prescribed every other day, pt stated she is using every day but has not had in 2 days 4/10/2021     gabapentin (NEURONTIN) 300 MG capsule Take 900 mg by mouth 3 (three) times a day. 4/10/2021     hydrOXYzine HCL (ATARAX) 25 MG tablet Take 1 tablet (25 mg total) by mouth every 4 (four) hours as needed for anxiety (pain medication enhancer for moderate to severe pain as needed). prn     imiquimod (ALDARA) 5 % cream Apply 1 application topically at bedtime as needed (warts). prn     leflunomide (ARAVA) 20 MG tablet Take 20 mg by mouth daily. 4/10/2021     levothyroxine (SYNTHROID, LEVOTHROID) 75 MCG tablet Take 75 mcg by mouth daily. 4/10/2021     lidocaine 4 % patch Place 1 patch on the skin daily as needed for pain. Remove and discard patch with 12 hours or as directed by MD. prn     lidocaine-prilocaine (EMLA) cream Apply topically as needed. prn     lurasidone (LATUDA) 20 mg Tab tablet Take 20 mg by mouth at bedtime. 4/10/2021     metFORMIN (GLUCOPHAGE-XR) 500 MG 24 hr tablet Take 1,000 mg by mouth daily with breakfast. 4/10/2021     methylPREDNISolone (MEDROL) 4 MG tablet Take 8 mg by mouth daily. 4/10/2021     modafiniL (PROVIGIL) 200 MG tablet Take 200 mg by mouth daily as needed (weakness / sleepiness). prn     morphine (MSIR) 15 MG tablet Take 15-30 mg by mouth 2 (two) times a day as needed for pain. prn     multivitamin (ONE A DAY) per tablet Take 1 tablet by mouth daily. 4/10/2021     naloxone (NARCAN) 4 mg/actuation nasal spray by Intranasal route as needed for opioid reversal. 1 spray (4 mg dose) into one nostril for opioid reversal. Call 911. May  repeat if no response in 3 minutes. prn     nystatin (MYCOSTATIN) cream Apply 1 application topically 2 (two) times a day as needed. prn     nystatin (MYCOSTATIN) powder Apply 1 application topically 4 (four) times a day as needed. prn     OLIVE LEAF EXTRACT ORAL Take 500 mg by mouth daily. prn     omega-3 acid ethyl esters (LOVAZA) 1 gram capsule Take 1 g by mouth 2 (two) times a day. prn     omeprazole (PRILOSEC) 20 MG capsule Take 20 mg by mouth 2 (two) times a day before meals. 4/10/2021     polyethylene glycol (MIRALAX) 17 gram packet Take 1 packet (17 g total) by mouth daily. 4/10/2021     polyvinyl alcohol (LIQUIFILM TEARS) 1.4 % ophthalmic solution Administer 1 drop to both eyes 3 (three) times a day as needed for dry eyes. prn     pravastatin (PRAVACHOL) 80 MG tablet Take 80 mg by mouth at bedtime. 4/10/2021     secukinumab (COSENTYX, 2 SYRINGES, SUBQ) Inject 300 mg under the skin every 28 days. Past Month at Unknown time     senna-docusate (PERICOLACE) 8.6-50 mg tablet Take 1-2 tablets by mouth 2 (two) times a day. Take while on oral narcotics to prevent or treat constipation. 4/10/2021     topiramate (TOPAMAX) 50 MG tablet Take 50 mg by mouth 2 (two) times a day. 4/10/2021     traZODone (DESYREL) 100 MG tablet Take 100 mg by mouth bedtime. 4/10/2021     varenicline (CHANTIX) 1 mg tablet Take 1 mg by mouth 2 (two) times a day. 4/10/2021     venlafaxine (EFFEXOR XR) 150 MG 24 hr capsule Take 150 mg by mouth daily. 4/10/2021     xylitoL 550 mg MaBT 1 tablet by Mucous Membrane route daily as needed. prn     Information source(s): Patient and CareEverywhere/SureScripts  Method of interview communication: in-person  Patient was asked about OTC/herbal products specifically. PTA med list reflects this.  Based on the pharmacist s assessment, the PTA med list information appears reliable  Allergies were reviewed, assessed, and updated with the patient.   Patient did not bring any medications to the hospital and  can't retrieve from home. No multi-dose medications are available for use during hospital stay.   Thank you for the opportunity to participate in the care of this patient.    Sol Banegas RPh 4/12/2021 3:56 PM        Electronically signed by Sol Banegas RPh at 04/12/2021 3:56 PM CDT

## 2021-04-13 NOTE — H&P
Urology Consult History and Physical    Name: Swathi Gibbons    MRN: [id number]   YOB: 1965               Chief Complaint:   Infected obstructed stone  History is obtained from patient, chart review          History of Present Illness:   Swathi Gibbons is a 55 year old female with PMHx psoriatic arthritis (on chronic daily steroids), asthma, history of alcohol abuse, NABOR, GERD, hypothyroidism, depression, anxiety, peptic ulcer, T2DM (not on insulin), chronic pain (managed with 2 fentanyl patches) who presented to outside ED today with 2 days of hematuria and abdominal pain. CT revealed two left ureteral stones with hydronephrosis, UA + nitrites and leuk esterase. Patient was also febrile and therefore urgently transferred for urologic intervention.    History is somewhat limited due to patient distress. She has never had a stone before. She started feeling abdominal pain 2 days ago and presented to ED 4/12 after no improvement. She denies symptomatic fever or chills, but was febrile to 101 at outside ED. She denies nausea or vomiting. She is having bloody urine but unable to describe further. She additionally complains of urinary frequency and urgency incontinence.    Per chart review, she recently had left shoulder surgery 3/16 and was treated as an outpatient for UTI 3/31 with keflex (and diflucan as yeast infection prophylaxis per NP's documentation.) Patient reports that this treatment helped. Of note, she reports anaphylactic allergy to moxifloxacin, hives with sulfa and cefaclor.         Past Medical History:     Past Medical History:   Diagnosis Date     Alcohol use disorder, severe, in sustained remission (H)      Anxiety and depression      Arthritis     PSORIATIC     Chronic rhinitis      Chronic rhinitis      COPD (chronic obstructive pulmonary disease) (H)      Diabetes (H)     Type II     Esophageal dysmotility      EtOH dependence (H)      Gastroesophageal reflux disease       Hypertension      Hypothyroidism      Latent tuberculosis      MRSA (methicillin resistant staph aureus) culture positive      Nodular goiter      NABOR (obstructive sleep apnea)     C-PAP     Other chronic pain     back pain     Psoriasis      Secondary hyperparathyroidism (H)      Tracheostomy in place (H)      Uncomplicated asthma      Vitamin D deficiency           Past Surgical History:     Past Surgical History:   Procedure Laterality Date     BACK SURGERY      L4-5 Decompression and Fusion     BREAST SURGERY      Right breast biopsy, benign     ENT SURGERY      Tonsillectomy     EYE SURGERY      Lasik     GI SURGERY      Bladder Suspension     GYN SURGERY       Section     GYN SURGERY      Tubal Ligation     INSERT STIMULATOR DORSAL COLUMN N/A 2019    Procedure: INSERTION, SPINAL CORD STIMULATOR, DORSAL COLUMN ([employer]TRONIC);  Surgeon: Reginald Pandey MD;  Location:  OR     INSERT STIMULATOR DORSAL COLUMN TRIAL N/A 2019    Procedure: SPINAL CORD STIMULATOR  TRIAL;  Surgeon: Reginald Pandey MD;  Location:  OR     ORTHOPEDIC SURGERY      ORIF Ankle Left 2017     ORTHOPEDIC SURGERY      Bunionectomy [place].          Social History:     Social History     Tobacco Use     Smoking status: Former Smoker     Packs/day: 0.50     Types: Cigarettes     Quit date: 2018     Years since quitting: 3.1     Smokeless tobacco: Never Used   Substance Use Topics     Alcohol use: No     Frequency: Never          Family History:   No family history on file.         Allergies:     Allergies   Allergen Reactions     Avelox [Moxifloxacin]      Cefaclor      Sulfa Drugs      Patient reports anaphylactic allergy to moxifloxacin. Hives in response to cefaclor and sulfa       Medications:     No current facility-administered medications for this encounter.      Current Outpatient Medications   Medication Sig     albuterol (PROAIR HFA/PROVENTIL HFA/VENTOLIN HFA) 108 (90 Base) MCG/ACT inhaler Inhale 2 puffs into the  lungs 3 times daily as needed for shortness of breath / dyspnea or wheezing (VENTOLIN)     Artificial Tear Solution (SOOTHE HYDRATION OP) Apply 1 drop to eye 4 times daily as needed      buPROPion (WELLBUTRIN XL) 150 MG 24 hr tablet Take 150 mg by mouth every morning (Takes with 300mg tablet for total dose of 450mg)     buPROPion (WELLBUTRIN XL) 300 MG 24 hr tablet Take 300 mg by mouth every morning (Take with 150 mg for a total of 450 mg daily)     busPIRone (BUSPAR) 5 MG tablet Take 5 mg by mouth daily as needed      cyclobenzaprine (FLEXERIL) 10 MG tablet Take 10 mg by mouth 3 times daily      cycloSPORINE (RESTASIS) 0.05 % ophthalmic emulsion Place 1 drop into both eyes 2 times daily     diltiazem (CARDIZEM) 60 MG tablet Take 60 mg by mouth 3 times daily     DULoxetine (CYMBALTA) 60 MG capsule Take 120 mg by mouth every evening (takes 2 x 60mg = 120mg daily)     fish oil-omega-3 fatty acids 1000 MG capsule Take 1 g by mouth daily     fluticasone (FLONASE) 50 MCG/ACT nasal spray Spray 1 spray into both nostrils 2 times daily      Fluticasone-Salmeterol (AIRDUO RESPICLICK 113/14 IN) Inhale 1 puff into the lungs daily      gabapentin (NEURONTIN) 300 MG capsule Take 900 mg by mouth 3 times daily (300MG X 3 = 900MG)     leflunomide (ARAVA) 20 MG tablet Take 20 mg by mouth every evening      levothyroxine (SYNTHROID/LEVOTHROID) 75 MCG tablet Take 75 mcg by mouth daily     Lidocaine (LIDOCARE) 4 % Patch Place 2 patches onto the skin every 12 hours as needed for moderate pain     lidocaine (LMX4) 4 % external cream Apply topically daily as needed for mild pain      metFORMIN (GLUCOPHAGE-XR) 500 MG 24 hr tablet Take 1,000 mg by mouth daily (500MG X 2 = 1,000MG)     methylPREDNISolone (MEDROL) 4 MG tablet Take 4 mg by mouth daily     mirabegron (MYRBETRIQ) 25 MG 24 hr tablet Take 25 mg by mouth every evening      modafinil (PROVIGIL) 200 MG tablet Take 200 mg by mouth every morning     morphine (MSIR) 15 MG IR tablet Take  15 mg by mouth 3 times daily as needed for severe pain     multivitamin, therapeutic (THERA-VIT) TABS tablet Take 1 tablet by mouth daily     naloxone (NARCAN) 4 MG/0.1ML nasal spray Spray 4 mg into one nostril alternating nostrils once as needed for opioid reversal every 2-3 minutes until assistance arrives     nystatin (MYCOSTATIN) 908533 UNIT/GM external cream Apply topically 2 times daily as needed for dry skin      nystatin (MYCOSTATIN) 086787 UNIT/GM external powder Apply topically 2 times daily as needed Under abdominal wall      omeprazole (PRILOSEC) 20 MG DR capsule Take 20 mg by mouth 2 times daily      oxyCODONE (OXYCONTIN) 15 MG 12 hr tablet Take 15 mg by mouth 3 times daily     phentermine (ADIPEX-P) 37.5 MG tablet Take 18.75 mg by mouth daily (37.5MG X 0.5 = 18.75MG)     secukinumab (COSENTYX) 150 MG/ML Sensoready pen Inject 300 mg Subcutaneous every 28 days     topiramate (TOPAMAX) 50 MG tablet Take 50 mg by mouth 2 times daily     traZODone (DESYREL) 100 MG tablet Take 100 mg by mouth At Bedtime      varenicline (CHANTIX) 1 MG tablet Take 1 mg by mouth 2 times daily     vitamin D2 (ERGOCALCIFEROL) 14095 units (1250 mcg) capsule Take 50,000 Units by mouth four times a week      Xylitol 500 MG DISK Take 1 Dose by mouth every evening as needed            Review of Systems:    ROS: See HPI for pertinent details.  Remainder of 10-point ROS negative.         Physical Exam:   Vitals: BP 91/44   Pulse 115   Temp 101.6  F (38.7  C) (Oral)   Resp 18   SpO2 97%   BMI= There is no height or weight on file to calculate BMI.  GEN:  AOx3. Appears uncomfortable, in pain.  HEENT:  Sclerae anicteric.  Conjunctivae pink.  Moist mucous membranes  NECK:  Supple.  No lymphadenopathy.  CV:  tachycardic  LUNGS: Non-labored breathing, tachypneic  BACK: + L CVA  ABD:  Soft.  NT.  ND.  No rebound or guarding.  No surgical scars. No masses.    EXT:  Warm, well perfused. SKIN:  Warm.  Dry.  No rashes.  NEURO:  CN grossly  "intact.           Data:   All laboratory data reviewed:    Outside ED labs:  UA + nitrite, LE, bacteria, >182 RBCs and WBCs, 0 squams  Lactate 0.9  Procal 1.99  CRP 14.4  WBC 8.2, 79% neutrophils  Hg 10.8  Cr 0.95    \"EXAM: CT ABDOMEN PELVIS WO ORAL W IV CONTRAST  LOCATION: [facility]  DATE/TIME: 4/12/2021 5:25 PM    INDICATION: UTI, fever.  COMPARISON: CT lumbar spine performed 03/30/2021.  TECHNIQUE: CT scan of the abdomen and pelvis was performed following injection of IV contrast. Multiplanar reformats were obtained. Dose reduction techniques were used.  CONTRAST: Iopamidol (Isovue-370) 100mL    FINDINGS:   LOWER CHEST: Normal.    HEPATOBILIARY: Normal.    PANCREAS: Normal.    SPLEEN: Normal.    ADRENAL GLANDS: Normal.    KIDNEYS/BLADDER: 5.5 x 3 x 6 mm stone seen in the left distal ureter. 5 x 2 x 5.5 mm left mid ureteral stone resulting in moderate left-sided hydroureteronephrosis. Nonobstructing left renal stone measuring up to 13 mm with an additional 5 mm   nonobstructing left inferior renal pole stone. 4.8 and 3.3 cm simple left renal cyst, no further follow-up required. Too small to characterize right renal cyst. No right-sided hydroureteronephrosis. Decompressed bladder with a small amount of air seen in  it, correlate to exclude cystitis.    BOWEL: Moderate volume retained feces. No obstruction, colitis, diverticulitis, or appendicitis.    LYMPH NODES: Normal.    VASCULATURE: Mild atherosclerotic vascular disease.    PELVIC ORGANS: Normal.    MUSCULOSKELETAL: Posterior fusion and laminectomy of L3-L4. Intrathecal pump device.    IMPRESSION:   1. 5.5 x 3 x 6 mm left distal ureteral stone with a 5 x 2 x 5.5 mm left mid ureteral stone resulting in moderate left-sided hydroureteronephrosis.  2. Bladder wall thickening with a small amount of air and a small amount of left periureteral stranding, correlate to exclude cystitis and ascending urinary tract infection.  3. Nonobstructing " "left renal stones.  4. Simple left renal cysts, no further follow-up required.  5. Diverticulosis. No diverticulitis, colitis, obstruction, or appendicitis.  6. Mild atherosclerotic vascular disease.  \"         Impression and Plan:   Impression / Plan:   Swathi Gibbons is a 55 year old female with complex medical history who presents with obstructing L ureteral stones, infected UA, and fever.    We covered the natural history of kidney stones, the risk of progression to symptomatic pain/infection, and the possibility of renal failure/kidney damage.  We discussed that given her infected urine, fever, tachycardia, treatment is urgent in her case. Given infection we would not treat the stone. We discussed options for drainage including PNT vs. Ureteral stent.  We covered surgical risks which include but are not limited to heart attack, stroke, blood clot in the legs or lungs, death, injury to surrounding organs and tissues, low risk of bleeding. Additional procedures may be necessary in the perioperative period.    Plan  - informed consent obtained for urgent L ureteral stent placement tonight  - NPO for procedure, mIVF 100/h  - continue gentamicin, +vanco for OR  - follow [facility] urine culture  - appreciate medicine consult for comanagement of this complex patient  - ISS  - home meds reordered, holding steroids   - dispo: anticipate 1-2 days pending pain control, resolution of infectious symptoms     Discussed with Dr. Dallas Hartman MD  PGY2  [id number]         "

## 2021-04-13 NOTE — PLAN OF CARE
"  VS: /64   Pulse 100   Temp 98  F (36.7  C) (Oral)   Resp 21   Ht 1.6 m (5' 3\")   Wt 111.6 kg (246 lb)   SpO2 94%   BMI 43.58 kg/m      O2: 2 LPM of O2   Output: Byrd draining edith urine   Last BM: Unknown, pt states \"a couple of days ago\"   Activity: SBA   Skin: Bruising throughout   Pain: Managed w/ Fentanyl patch. Complained of a migraine and was tearful. MD notified and Imitrex ordered   CMS: Intact   Dressing: N/A   Diet: Regular, diabetic. No appetite during shift   LDA: R PIVs saline locked   Equipment: Personal belongings, capno, call light within reach   Plan: Continue to monitor    Additional Info:       "

## 2021-04-13 NOTE — PROGRESS NOTES
Antimicrobial Stewardship Team Note    Antimicrobial Stewardship Program - A joint venture between [facility] and  Physicians to optimize antibiotic management.  NOT a formal consult - Restricted Antimicrobial Review     Patient: Swathi Gibbons  MRN: [id number]  Allergies: Avelox [moxifloxacin], Cefaclor, and Sulfa drugs    Brief Summary: Swathi Gibbons is a 55 year old female with PMHx psoriatic arthritis (on chronic daily steroids), asthma, NABOR, history of alcohol abuse, peptic ulcer, GERD, hypothyroidism, depression, anxiety, T2DM, and chronic pain (managed with 2 fentanyl patches). She was transferred from OSH on 4/13/2021 for urologic intervention.    History of Present Illness: Patient presented to outside ED on 4/12 with 2 days of urinary frequency and urgency, hematuria and abdominal pain. Denied symptomatic fever or chills, but was febrile to 101 at outside ED. Denied nausea or vomiting. WBC 8.2, UA positive (>182 WBC, +LE & nitrite, mod bacteria) Ucx pending. OSH CT abdomen/pelvis showed 5.5 x 3 x 6 mm left distal ureteral stone with a 5 x 2 x 5.5 mm left mid ureteral stone resulting in moderate left-sided hydroureteronephrosis. Bladder wall thickening with a small amount of air and a small amount of left periureteral stranding, nonobstructing left renal stones and simple left renal cysts. Patient was transferred to [facility] for urological intervention.     On presentation, patient was febrile (tmax 102.1 F), tachycardic, hypotensive, and on 3L O2 via NC. She was emergently taken to OR for left ureteral stent and ramsey catheter placement. Given gentamicin and vancomycin perioperatively and continued on both given allergy to cefaclor and Moxifloxacin.  Per chart review, patient reports anaphylactic allergy to Moxifloxacin and hives with sulfa and cefaclor. She has tolerated  cephalexin (4/2021) and cefazolin (4/2019). Unable to complete allergy assessment today after multiple attempts to  reach patient on her hospital bed phone.   Called microbiology lab at OSH, blood culture is positive with gram-negative rods, pending identification and susceptibility. Urine culture >100,000 colonies/mL of GNR, pending identification and susceptibility.         Active Anti-infective Medications   (From admission, onward)                 Start     Stop    04/13/21 1700  gentamicin (GARAMYCIN) 40 mg/mL IM  5 mg/kg (Adjusted),   Intravenous,   EVERY 24 HOURS     Urinary Tract Infection        --    04/13/21 1600  vancomycin (VANCOCIN) injection  1,500 mg,   Intravenous,   EVERY 12 HOURS     Urinary Tract Infection        --                  Assessment: Pyelonephritis with ureteral stone  Patient presented with sepsis from urinary source found to have pyelonephritis with ureteral stone s/p stent placement. Patient is currently on gentamicin and vancomycin for UTI given history of allergic reaction to cefaclor (hives) and moxifloxacin (anaphylaxis). However, patient does tolerate cephalexin and cefazolin, first generation cephalosporins with cross reactivity to cefaclor. Less likely patient will react to a 3rd or 4th generation cephalosporins such as ceftriaxone or cefepime given no cross-reactivity. Blood and urine cultures from OSH are positive with gram-negative rods. Per discussion with microbiology technician at OSH, the GNRs on Ucx do not look like E. coli and concerning for Morganella or Proteus, ampC producing organisms. Recommend stopping gentamicin and initiated cefepime to cover GNRs from blood and urine culture. MRSA is rarely associated with UTI and patient does not have history of E. Faecalis UTI to warrant continuation of vancomycin. Recommend stopping vancomycin.   Follow up with blood and urine culture from [facility] to further target antimicrobial therapy. Repeat blood culture to ensure clearance.    Recommendations:  1. Stop Vancomycin and gentamicin   2. Start cefepime 2g every 8 hours,  pending OSH blood and urine culture result   3. Obtain blood culture from 2 separate sites as able     Addendum 04/13/21 1:27 PM  If patient's clinical status worsens, consider changing cefepime to meropenem pending blood and urine culture results from OSH.    Pharmacy took the following actions: Called/paged provider, Electronic note created.    Discussed with ID Staff:  MD Tiesha Sharp, PharmD, BCIDP  Pager: [phone]      Vital Signs/Clinical Features:  Vitals         04/11 0700  -  04/12 0659 04/12 0700  -  04/13 0659 04/13 0700  -  04/13 1259   Most Recent    Temp ( F)   98.8 -  102.1    98.9 -  99.1     98.9 (37.2)    Pulse   99 -  116    98 -  102     99    Resp   12 -  18    21 -  22     21    BP   72/37 -  105/76    87/46 -  101/68     101/45    SpO2 (%)   92 -  99    91 -  93     93            Labs  Estimated Creatinine Clearance: 76.4 mL/min (based on SCr of 1 mg/dL).  Recent Labs   Lab Test 04/13/21  1213   CR 1.00                   Recent Labs   Lab Test 04/13/21  0339   LACT 1.7             Culture Results:  7-Day Micro Results       ** No results found for the last 168 hours. **                                    Imaging: Xr Surgery Shanthi L/t 5 Min Fluoro    Result Date: 4/13/2021  This exam was marked as non-reportable because it will not be read by a radiologist or a [place] non-radiologist provider.

## 2021-04-13 NOTE — ANESTHESIA POSTPROCEDURE EVALUATION
Patient: Swathi Gibbons    Procedure(s):  CYSTOSCOPY, WITH URETERAL STENT INSERTION    Diagnosis:Left ureteral stone [N20.1]  Diagnosis Additional Information: No value filed.    Anesthesia Type:  No value filed.    Note:  Disposition: Admission   Postop Pain Control: Uneventful            Sign Out: Well controlled pain   PONV: No   Neuro/Psych: Uneventful            Sign Out: Acceptable/Baseline neuro status   Airway/Respiratory: Uneventful            Sign Out: Acceptable/Baseline resp. status   CV/Hemodynamics: Uneventful            Sign Out: Acceptable CV status   Other NRE: NONE   DID A NON-ROUTINE EVENT OCCUR? No         Last vitals:  Vitals:    04/13/21 0545 04/13/21 0600 04/13/21 0615   BP: 95/62 98/69 105/76   Pulse: 100 99 101   Resp: 18 12 15   Temp: 37.1  C (98.8  F)  37.3  C (99.1  F)   SpO2: 93% 93% 93%       Last vitals prior to Anesthesia Care Transfer:  CRNA VITALS  4/13/2021 0440 - 4/13/2021 0540      4/13/2021             NIBP:  95/63    Pulse:  99    NIBP Mean:  71    Temp:  37.6  C (99.7  F)    SpO2:  100 %          Electronically Signed By: Papa Ramos MD  April 13, 2021  6:44 AM

## 2021-04-13 NOTE — OR NURSING
PACU to Inpatient Nursing Handoff    Patient Swathi Gibbons is a 55 year old female who speaks English.   Procedure Procedure(s):  CYSTOSCOPY, WITH URETERAL STENT INSERTION   Surgeon(s) Primary: Luis Antonio Haynes MD  Resident - Assisting: Naty Hartman MD     Allergies   Allergen Reactions     Avelox [Moxifloxacin]      Cefaclor      Sulfa Drugs        Isolation  [unfilled]     Past Medical History   has a past medical history of Alcohol use disorder, severe, in sustained remission (H), Anxiety and depression, Arthritis, Chronic rhinitis, Chronic rhinitis, COPD (chronic obstructive pulmonary disease) (H), Diabetes (H), Esophageal dysmotility, EtOH dependence (H), Gastroesophageal reflux disease, Hypertension, Hypothyroidism, Latent tuberculosis, MRSA (methicillin resistant staph aureus) culture positive, Nodular goiter, NABOR (obstructive sleep apnea), Other chronic pain, Psoriasis, Secondary hyperparathyroidism (H), Tracheostomy in place (H), Uncomplicated asthma, and Vitamin D deficiency.    Anesthesia Choice   Dermatome Level     Preop Meds Not applicable   Nerve block Not applicable   Intraop Meds OR medications are as follows:     Medication Rate/Dose/Volume Action Route Date Time  Administering User Audit   midazolam  1mg/mL (mg) 2 mg Given Intravenous 04/13/21 0421  Anai Morris APRN CRNA    Total dose as of 04/13/21 0521           2 mg           fentaNYL (SUBLIMAZE) injection (mcg) 25 mcg Given Intravenous 04/13/21 0443  Anai Morris APRN CRNA edited   Total dose as of 04/13/21 0521           25 mcg           lidocaine 2% (mg) 100 mg Given Intravenous 04/13/21 0431  Anai Morris APRN CRNA edited   Total dose as of 04/13/21 0521           100 mg           propofol (DIPRIVAN) injection 10 mg/mL vial (mg) 50 mg Given Intravenous 04/13/21 0431  Anai Morris APRN CRNA    Total dose as of 04/13/21 0521           50 mg           propofol  (DIPRIVAN) injection 10 mg/mL vial (mcg/kg/min) 100 mcg/kg/min New Bag Intravenous 04/13/21 0431  Anai Morris APRN CRNA    Dosing weight:  126 kg  Stopped Intravenous 0503  Anai Morris APRN CRNA    Total dose as of 04/13/21 0521           403.2 mg           ondansetron 2mg/mL (mg) 4 mg Given Intravenous 04/13/21 0452  Anai Morris APRN CRNA edited   Total dose as of 04/13/21 0521           4 mg           ketorolac 30mg/mL (mg) 15 mg Given Intravenous 04/13/21 0455  Anai Morris APRN CRNA edited   Total dose as of 04/13/21 0521           15 mg           phenylephrine (CLARA-SYNEPHRINE) injection (mcg) 100 mcg New Bag Intravenous 04/13/21 0443  Anai Morris APRN CRNA edited   Total dose as of 04/13/21 0521 150 mcg Bolus Intravenous 0446  Anai Morris APRN CRNA    700 mcg 100 mcg Bolus Intravenous 0455  Anai Morris APRN CRNA     100 mcg Bolus Intravenous 0457  Anai Morris APRN CRNA edited    100 mcg Bolus Intravenous 0501  Anai Morris APRN CRNA     150 mcg Bolus Intravenous 0502  Anai Morris APRN CRNA    phenylephrine 0.1 mg/mL infusion (mcg/kg/min) (mcg/kg/min) 0.2 mcg/kg/min New Bag Intravenous 04/13/21 0443  Anai Morris APRN CRNA edited   Dosing weight:  126 kg 0.6 mcg/kg/min Rate Change Intravenous 0450  Anai Morris APRN CRNA    Total dose as of 04/13/21 0521           2.52 mg           vancomycin (VANCOCIN) 1000 mg in dextrose 5% 200 mL PREMIX (g) 1 g (over 60 min) Given Intravenous 04/13/21 0448  Anai Morris APRN CRNA    Total dose as of 04/13/21 0521           1 g           albumin 5 % bolus (mL) 125 mL Given Intravenous 04/13/21 0506  Anai Morris, JIMMIE CRNA edited   Total dose as of 04/13/21 05 Comment: Continue infusing in PACU       125 mL           LR (mL)  New Bag Intravenous 04/13/21 0424  Arturo  JIMMIE Hernandez CRNA edited   Total volume as of 04/13/21 0521 800 mL Anesthesia Volume Adjustment Intravenous 0505  Arturo JIMMIE Hernandez CRNA    800 mL                Local Meds No   Antibiotics vancomycin (Vancocin) - last given at 0448     Pain Patient Currently in Pain: denies   PACU meds  Not applicable   PCA / epidural No   Capnography Respiratory Monitoring (EtCO2): 33 mmHg   Telemetry ECG Rhythm: Sinus rhythm   Inpatient Telemetry Monitor Ordered? No        Labs Glucose Lab Results   Component Value Date    GLC 96 09/16/2007       Hgb Lab Results   Component Value Date    HGB 15.2 09/14/2007       INR No results found for: INR   PACU Imaging Not applicable     Wound/Incision Incision/Surgical Site 04/04/19 Back (Active)   Number of days: 740       Incision/Surgical Site 05/09/19 Right Back (Active)   Number of days: 705       Incision/Surgical Site 05/09/19 Midline Back (Active)   Number of days: 705      CMS        Equipment ice pack ice placed over lower abdomen   Other LDA       IV Access Peripheral IV 04/12/21 Right Upper arm (Active)   Site Assessment [facility] 04/13/21 0515   Line Status Infusing 04/13/21 0515   Phlebitis Scale 0-->no symptoms 04/13/21 0515   Infiltration Scale 0 04/13/21 0515   Infiltration Site Treatment Method  None 04/13/21 0300   If infiltrated, was a Vesicant infusing? No 04/13/21 0300   Number of days: 1       Peripheral IV 04/12/21 Right Lower forearm (Active)   Site Assessment [facility] 04/13/21 0515   Line Status Infusing 04/13/21 0515   Phlebitis Scale 0-->no symptoms 04/13/21 0515   Infiltration Scale 0 04/13/21 0515   Infiltration Site Treatment Method  None 04/13/21 0300   If infiltrated, was a Vesicant infusing? No 04/13/21 0300   Number of days: 1      Blood Products Not applicable EBL 0 mL   Intake/Output    Drains / Byrd Urethral Catheter Latex 16 fr (Active)   Tube Description UTV 04/13/21 0605   Collection Container Standard 04/13/21 0605   Securement Method  Securing device (Describe) 04/13/21 0605   Rationale for Continued Use /GI/GYN Pelvic Procedure;Anesthesia 04/13/21 0605   Urine Output 50 mL 04/13/21 0605   Number of days: 0      Time of void PreOp      PostOp      Diapered? No   Bladder Scan     PO    not started at this time, pt resting     Vitals    B/P: 98/69  T: 98.8  F (37.1  C)    Temp src: Oral  P:  Pulse: 99 (04/13/21 0600)          R: 12  O2:  SpO2: 93 %    O2 Device: Nasal cannula (04/13/21 0600)    Oxygen Delivery: 2 LPM (04/13/21 0530)         Family/support present will call pt's sister Sarah per pt request   Patient belongings     Patient transported on bed and air mat   DC meds/scripts (obs/outpt) Not applicable   Inpatient Pain Meds Released? Yes       Special needs/considerations pt restless in pacu, pt phenylepherine drip stopped at 0539   Tasks needing completion None       Linda Lopez RN  [facility] [id number]

## 2021-04-13 NOTE — PHARMACY-VANCOMYCIN DOSING SERVICE
Pharmacy Vancomycin Initial Note  Date of Service 2021  Patient's  1965  55 year old, female    Indication: Urinary Tract Infection    Current estimated CrCl = Estimated Creatinine Clearance: 76.4 mL/min (based on SCr of 1 mg/dL).    Creatinine for last 3 days  2021: 12:13 PM Creatinine 1.00 mg/dL    Recent Vancomycin Level(s) for last 3 days  No results found for requested labs within last 72 hours.      Vancomycin IV Administrations (past 72 hours)                   vancomycin (VANCOCIN) 1000 mg in dextrose 5% 200 mL PREMIX (g) 1 g Given 21 0448                Nephrotoxins and other renal medications (From now, onward)    Start     Dose/Rate Route Frequency Ordered Stop    21 1700  gentamicin (GARAMYCIN) 380 mg in sodium chloride 0.9 % 50 mL intermittent infusion      5 mg/kg × 76.1 kg (Adjusted)  over 60 Minutes Intravenous EVERY 24 HOURS 21 0820      21 1600  vancomycin (VANCOCIN) 1,500 mg in sodium chloride 0.9 % 250 mL intermittent infusion     Note to Pharmacy: pharmacy to adjust per renal function    1,500 mg  over 90 Minutes Intravenous EVERY 12 HOURS 21 0713      21 1300  furosemide (LASIX) tablet 20 mg      20 mg Oral EVERY OTHER DAY 21 1235            Contrast Orders - past 72 hours (72h ago, onward)    None                Plan:  1.  Start vancomycin  1500 mg IV q12h.   2.  Goal Trough Level: 10-15 mg/L   3.  Pharmacy will check trough levels as appropriate in 1-3 Days.    4. Serum creatinine levels will be ordered a minimum of twice weekly.    5. Inman method utilized to dose vancomycin therapy: Method 2    Estela Call [facility]

## 2021-04-13 NOTE — ANESTHESIA CARE TRANSFER NOTE
Patient: Swathi Gibbons    Procedure(s):  CYSTOSCOPY, WITH URETERAL STENT INSERTION    Diagnosis: Left ureteral stone [N20.1]  Diagnosis Additional Information: No value filed.    Anesthesia Type:   No value filed.     Note:    Oropharynx: oropharynx clear of all foreign objects  Level of Consciousness: drowsy  Oxygen Supplementation: face mask    Independent Airway: airway patency satisfactory and stable  Dentition: dentition unchanged  Vital Signs Stable: post-procedure vital signs reviewed and stable  Report to RN Given: handoff report given  Patient transferred to: PACU  Comments: Regular respirations and patent airway. VSS. IV patent and infusing. Vanco, phenylephrine, and albumin bolus continue infusing. Pt resting comfortably. Report given to RN    Handoff Report: Identifed the Patient, Identified the Reponsible Provider, Reviewed the pertinent medical history, Discussed the surgical course, Reviewed Intra-OP anesthesia mangement and issues during anesthesia, Set expectations for post-procedure period and Allowed opportunity for questions and acknowledgement of understanding      Vitals: (Last set prior to Anesthesia Care Transfer)  CRNA VITALS  4/13/2021 0440 - 4/13/2021 0520      4/13/2021             NIBP:  95/63    Pulse:  99    NIBP Mean:  71    Temp:  37.6  C (99.7  F)    SpO2:  100 %        Electronically Signed By: JIMMIE Thomas CRNA  April 13, 2021  5:21 AM

## 2021-04-13 NOTE — BRIEF OP NOTE
[facility]    Brief Operative Note    Pre-operative diagnosis: Left ureteral stone [N20.1]  Post-operative diagnosis Same as pre-operative diagnosis    Procedure: Procedure(s):  CYSTOSCOPY, WITH RETROGRADE PYELOGRAM AND URETERAL STENT INSERTION  Surgeon: Surgeon(s) and Role:     * Luis Antonio Haynes MD - Primary     * Naty Hartman MD - Resident - Assisting  Anesthesia: Choice   Estimated blood loss: None  Drains: 6 Fr x 26 cm double J ureteral stent  Specimens: * No specimens in log *  Findings:   bloody purulent urine.  Complications: None.  Implants: * No implants in log *    Plan  Transfer back to floor  Continue gent and vanc  Byrd to drainage until 24 h afebrile  Medicine consult

## 2021-04-13 NOTE — PHARMACY-AMINOGLYCOSIDE DOSING SERVICE
Pharmacy Aminoglycoside Initial Note  Date of Service 2021  Patient's  1965  55 year old, female    Weight (Adjusted):  76.1 kg    Indication: Urinary Tract Infection    Current estimated CrCl = Estimated Creatinine Clearance: 76.4 mL/min (based on SCr of 1 mg/dL).    Creatinine for last 3 days  2021: 12:13 PM Creatinine 1.00 mg/dL     Nephrotoxins and other renal medications (From now, onward)    Start     Dose/Rate Route Frequency Ordered Stop    21 1700  gentamicin (GARAMYCIN) 380 mg in sodium chloride 0.9 % 50 mL intermittent infusion      5 mg/kg × 76.1 kg (Adjusted)  over 60 Minutes Intravenous EVERY 24 HOURS 21 0820      21 1600  vancomycin (VANCOCIN) 1,500 mg in sodium chloride 0.9 % 250 mL intermittent infusion     Note to Pharmacy: pharmacy to adjust per renal function    1,500 mg  over 90 Minutes Intravenous EVERY 12 HOURS 21 0713      21 1300  furosemide (LASIX) tablet 20 mg      20 mg Oral EVERY OTHER DAY 21 1235            Contrast Orders - past 72 hours (72h ago, onward)    None          Aminoglycoside Levels - past 2 days  No results found for requested labs within last 48 hours.    Aminoglycosides IV Administrations (past 72 hours)      No aminoglycosides orders with administrations in past 72 hours.                    Plan:  1.  Start Gentamicin 380 mg (5 mg/kg) IV q24h.   2.  Target goals based on extended interval dosing  3.  Goal peak level: 17-24 mg/L  4.  Goal trough level: <0.5mg/L  5.  Pharmacy will continue to follow and check levels as appropriate in 1-3 Days      Estela Call [facility]

## 2021-04-13 NOTE — PROVIDER NOTIFICATION
Pt settled into room and vital signs taken. Temp 102.1 and -120. Pt requiring 3 LPM O2 >90%. Sepsis protocol triggered and MD text paged.

## 2021-04-13 NOTE — OP NOTE
OPERATIVE REPORT    PREOPERATIVE DIAGNOSIS: Left ureteral stones, UTI.    POSTOPERATIVE DIAGNOSIS:  Same    PROCEDURES PERFORMED:   1. Cystourethroscopy with left retrograde pyelography  2. Placement of left ureteral stent.  3. Intraoperative interpretation of fluoroscopic imaging.     STAFF SURGEON: Luis Antonio Haynes MD    RESIDENT: Naty Hartman MD    ANESTHESIA: Choice    ESTIMATED BLOOD LOSS: 0 mL.     DRAINS: 6Fr x 26cm double J left ureteral stent; 16 Fr ramsey catheter    OPERATIVE INDICATIONS:   Swathi Gibbons is a 55 year old female who presented with a left obstructing ureteral stone, UTI, fever, and tachycardia. The patient was counseled on the alternatives, risks, and benefits and elected to proceed with the above stated procedure.    DESCRIPTION OF PROCEDURE:    After informed consent was obtained, the patient was taken to the operating room, and moved to the operating table.  After adequate anesthesia was induced, the patient was repositioned in dorsal lithotomy position and prepped and draped in the usual sterile fashion. A timeout was taken to confirm correct patient, procedure and laterality.     A 22-Portuguese cystoscope was inserted into a well lubricated urethra. The urethra was unremarkable.  The bladder was free of tumors, stones or diverticuli.  The media was bloody and purulent.  Bilateral ureteral orifices were orthotopic.  A Sensor guidewire was advanced into the left renal pelvis with the aid of a 5-Portuguese open ended ureteral catheter. Contrast was still visible in patient's left kidney from previous IV contrast revealing moderate hydronephrosis consistent with CT findings, therefore we elected to not perform a retrograde pyelogramA 6 Fr x 26 cm double J ureteral stent was advanced over the guidewire under fluoroscopic guidance with a good curl in the renal pelvis and bladder.  The stent passed up easily with no appreciable resistance.  The bladder was then drained and a 16Fr ramsey was  placed    The patient tolerated the procedure well.  There were no complications.       PLAN:   - Admit for monitoring  - Transfer back to floor  - Continue gent and vanc  - Byrd to drainage until 24 h afebrile  - Medicine consult  - Follow-up with Dr Haynes for definitive stone management in next 2-3 weeks    I was present and scrubbed for the entire procedure.  Luis Antonio Haynes MD  Urology Staff

## 2021-04-14 ENCOUNTER — COMMUNICATION - HEALTHEAST (OUTPATIENT)
Dept: SCHEDULING | Facility: CLINIC | Age: 56
End: 2021-04-14

## 2021-04-14 LAB
ANION GAP SERPL CALCULATED.3IONS-SCNC: 8 MMOL/L (ref 3–14)
BUN SERPL-MCNC: 19 MG/DL (ref 7–30)
CALCIUM SERPL-MCNC: 8.2 MG/DL (ref 8.5–10.1)
CHLORIDE SERPL-SCNC: 107 MMOL/L (ref 94–109)
CO2 SERPL-SCNC: 26 MMOL/L (ref 20–32)
CREAT SERPL-MCNC: 0.83 MG/DL (ref 0.52–1.04)
ERYTHROCYTE [DISTWIDTH] IN BLOOD BY AUTOMATED COUNT: 16.1 % (ref 10–15)
GFR SERPL CREATININE-BSD FRML MDRD: 79 ML/MIN/{1.73_M2}
GLUCOSE BLDC GLUCOMTR-MCNC: 109 MG/DL (ref 70–99)
GLUCOSE BLDC GLUCOMTR-MCNC: 130 MG/DL (ref 70–99)
GLUCOSE BLDC GLUCOMTR-MCNC: 135 MG/DL (ref 70–99)
GLUCOSE BLDC GLUCOMTR-MCNC: 195 MG/DL (ref 70–99)
GLUCOSE SERPL-MCNC: 108 MG/DL (ref 70–99)
HCT VFR BLD AUTO: 32.6 % (ref 35–47)
HGB BLD-MCNC: 9.8 G/DL (ref 11.7–15.7)
MCH RBC QN AUTO: 28 PG (ref 26.5–33)
MCHC RBC AUTO-ENTMCNC: 30.1 G/DL (ref 31.5–36.5)
MCV RBC AUTO: 93 FL (ref 78–100)
PLATELET # BLD AUTO: 134 10E9/L (ref 150–450)
POTASSIUM SERPL-SCNC: 3.3 MMOL/L (ref 3.4–5.3)
POTASSIUM SERPL-SCNC: 3.9 MMOL/L (ref 3.4–5.3)
POTASSIUM SERPL-SCNC: 4.1 MMOL/L (ref 3.4–5.3)
RBC # BLD AUTO: 3.5 10E12/L (ref 3.8–5.2)
SODIUM SERPL-SCNC: 141 MMOL/L (ref 133–144)
WBC # BLD AUTO: 5.4 10E9/L (ref 4–11)

## 2021-04-14 PROCEDURE — 250N000013 HC RX MED GY IP 250 OP 250 PS 637: Performed by: STUDENT IN AN ORGANIZED HEALTH CARE EDUCATION/TRAINING PROGRAM

## 2021-04-14 PROCEDURE — 250N000013 HC RX MED GY IP 250 OP 250 PS 637: Performed by: INTERNAL MEDICINE

## 2021-04-14 PROCEDURE — 80048 BASIC METABOLIC PNL TOTAL CA: CPT | Performed by: STUDENT IN AN ORGANIZED HEALTH CARE EDUCATION/TRAINING PROGRAM

## 2021-04-14 PROCEDURE — 250N000011 HC RX IP 250 OP 636: Performed by: STUDENT IN AN ORGANIZED HEALTH CARE EDUCATION/TRAINING PROGRAM

## 2021-04-14 PROCEDURE — 99232 SBSQ HOSP IP/OBS MODERATE 35: CPT | Performed by: INTERNAL MEDICINE

## 2021-04-14 PROCEDURE — 84132 ASSAY OF SERUM POTASSIUM: CPT | Performed by: INTERNAL MEDICINE

## 2021-04-14 PROCEDURE — 250N000012 HC RX MED GY IP 250 OP 636 PS 637: Performed by: PHYSICIAN ASSISTANT

## 2021-04-14 PROCEDURE — 250N000013 HC RX MED GY IP 250 OP 250 PS 637: Performed by: PHYSICIAN ASSISTANT

## 2021-04-14 PROCEDURE — 120N000002 HC R&B MED SURG/OB UMMC

## 2021-04-14 PROCEDURE — 999N001017 HC STATISTIC GLUCOSE BY METER IP

## 2021-04-14 PROCEDURE — 85027 COMPLETE CBC AUTOMATED: CPT | Performed by: STUDENT IN AN ORGANIZED HEALTH CARE EDUCATION/TRAINING PROGRAM

## 2021-04-14 PROCEDURE — 36415 COLL VENOUS BLD VENIPUNCTURE: CPT | Performed by: INTERNAL MEDICINE

## 2021-04-14 PROCEDURE — 36415 COLL VENOUS BLD VENIPUNCTURE: CPT | Performed by: STUDENT IN AN ORGANIZED HEALTH CARE EDUCATION/TRAINING PROGRAM

## 2021-04-14 RX ORDER — POTASSIUM CHLORIDE 750 MG/1
40 TABLET, EXTENDED RELEASE ORAL ONCE
Status: COMPLETED | OUTPATIENT
Start: 2021-04-14 | End: 2021-04-14

## 2021-04-14 RX ORDER — NALOXONE HYDROCHLORIDE 0.4 MG/ML
0.2 INJECTION, SOLUTION INTRAMUSCULAR; INTRAVENOUS; SUBCUTANEOUS
Status: DISCONTINUED | OUTPATIENT
Start: 2021-04-14 | End: 2021-04-15 | Stop reason: HOSPADM

## 2021-04-14 RX ORDER — NALOXONE HYDROCHLORIDE 0.4 MG/ML
0.4 INJECTION, SOLUTION INTRAMUSCULAR; INTRAVENOUS; SUBCUTANEOUS
Status: DISCONTINUED | OUTPATIENT
Start: 2021-04-14 | End: 2021-04-15 | Stop reason: HOSPADM

## 2021-04-14 RX ADMIN — DILTIAZEM HYDROCHLORIDE 60 MG: 60 TABLET, FILM COATED ORAL at 14:42

## 2021-04-14 RX ADMIN — TOPIRAMATE 50 MG: 25 TABLET, FILM COATED ORAL at 20:11

## 2021-04-14 RX ADMIN — CYCLOSPORINE 1 DROP: 0.5 EMULSION OPHTHALMIC at 08:39

## 2021-04-14 RX ADMIN — DILTIAZEM HYDROCHLORIDE 60 MG: 60 TABLET, FILM COATED ORAL at 08:38

## 2021-04-14 RX ADMIN — LORAZEPAM 0.5 MG: 0.5 TABLET ORAL at 01:15

## 2021-04-14 RX ADMIN — BUPROPION HYDROCHLORIDE 300 MG: 300 TABLET, FILM COATED, EXTENDED RELEASE ORAL at 08:37

## 2021-04-14 RX ADMIN — GABAPENTIN 900 MG: 300 CAPSULE ORAL at 08:37

## 2021-04-14 RX ADMIN — CEFEPIME HYDROCHLORIDE 2 G: 2 INJECTION, POWDER, FOR SOLUTION INTRAVENOUS at 23:14

## 2021-04-14 RX ADMIN — METHYLPREDNISOLONE 8 MG: 8 TABLET ORAL at 09:30

## 2021-04-14 RX ADMIN — LEVOTHYROXINE SODIUM 75 MCG: 75 TABLET ORAL at 08:37

## 2021-04-14 RX ADMIN — DILTIAZEM HYDROCHLORIDE 60 MG: 60 TABLET, FILM COATED ORAL at 20:10

## 2021-04-14 RX ADMIN — TRAZODONE HYDROCHLORIDE 100 MG: 100 TABLET ORAL at 23:14

## 2021-04-14 RX ADMIN — VARENICLINE TARTRATE 1 MG: 1 TABLET, FILM COATED ORAL at 08:37

## 2021-04-14 RX ADMIN — GABAPENTIN 900 MG: 300 CAPSULE ORAL at 20:10

## 2021-04-14 RX ADMIN — DOCUSATE SODIUM 50MG AND SENNOSIDES 8.6MG 2 TABLET: 8.6; 5 TABLET, FILM COATED ORAL at 08:38

## 2021-04-14 RX ADMIN — TOPIRAMATE 50 MG: 25 TABLET, FILM COATED ORAL at 08:38

## 2021-04-14 RX ADMIN — POLYETHYLENE GLYCOL 3350 17 G: 17 POWDER, FOR SOLUTION ORAL at 08:48

## 2021-04-14 RX ADMIN — MODAFINIL 200 MG: 200 TABLET ORAL at 08:37

## 2021-04-14 RX ADMIN — GABAPENTIN 900 MG: 300 CAPSULE ORAL at 14:42

## 2021-04-14 RX ADMIN — LEFLUNOMIDE 20 MG: 20 TABLET ORAL at 08:38

## 2021-04-14 RX ADMIN — DOCUSATE SODIUM 50MG AND SENNOSIDES 8.6MG 2 TABLET: 8.6; 5 TABLET, FILM COATED ORAL at 20:11

## 2021-04-14 RX ADMIN — OMEPRAZOLE 20 MG: 20 CAPSULE, DELAYED RELEASE ORAL at 15:51

## 2021-04-14 RX ADMIN — LORAZEPAM 0.5 MG: 0.5 TABLET ORAL at 09:40

## 2021-04-14 RX ADMIN — FLUTICASONE PROPIONATE 1 SPRAY: 50 SPRAY, METERED NASAL at 20:11

## 2021-04-14 RX ADMIN — VARENICLINE TARTRATE 1 MG: 1 TABLET, FILM COATED ORAL at 18:10

## 2021-04-14 RX ADMIN — LURASIDONE HYDROCHLORIDE 20 MG: 20 TABLET, FILM COATED ORAL at 23:14

## 2021-04-14 RX ADMIN — POTASSIUM CHLORIDE 40 MEQ: 750 TABLET, EXTENDED RELEASE ORAL at 18:03

## 2021-04-14 RX ADMIN — ACETAMINOPHEN 650 MG: 325 TABLET, FILM COATED ORAL at 08:37

## 2021-04-14 RX ADMIN — FLUTICASONE PROPIONATE 1 SPRAY: 50 SPRAY, METERED NASAL at 08:44

## 2021-04-14 RX ADMIN — KETOROLAC TROMETHAMINE 30 MG: 30 INJECTION, SOLUTION INTRAMUSCULAR; INTRAVENOUS at 01:15

## 2021-04-14 RX ADMIN — ACETAMINOPHEN 650 MG: 325 TABLET, FILM COATED ORAL at 18:17

## 2021-04-14 RX ADMIN — CEFEPIME HYDROCHLORIDE 2 G: 2 INJECTION, POWDER, FOR SOLUTION INTRAVENOUS at 07:07

## 2021-04-14 RX ADMIN — CYCLOSPORINE 1 DROP: 0.5 EMULSION OPHTHALMIC at 20:10

## 2021-04-14 RX ADMIN — VENLAFAXINE HYDROCHLORIDE 150 MG: 150 CAPSULE, EXTENDED RELEASE ORAL at 08:37

## 2021-04-14 RX ADMIN — OMEPRAZOLE 20 MG: 20 CAPSULE, DELAYED RELEASE ORAL at 08:37

## 2021-04-14 RX ADMIN — CEFEPIME HYDROCHLORIDE 2 G: 2 INJECTION, POWDER, FOR SOLUTION INTRAVENOUS at 15:51

## 2021-04-14 RX ADMIN — KETOROLAC TROMETHAMINE 30 MG: 30 INJECTION, SOLUTION INTRAMUSCULAR; INTRAVENOUS at 20:10

## 2021-04-14 RX ADMIN — PRAVASTATIN SODIUM 80 MG: 20 TABLET ORAL at 23:14

## 2021-04-14 RX ADMIN — FLUTICASONE FUROATE AND VILANTEROL TRIFENATATE 1 PUFF: 100; 25 POWDER RESPIRATORY (INHALATION) at 08:46

## 2021-04-14 RX ADMIN — TOLTERODINE TARTRATE 2 MG: 2 TABLET, FILM COATED ORAL at 20:12

## 2021-04-14 NOTE — PROGRESS NOTES
VS: VSS.    O2: Pt has COPD. sats are in the lower 90's but stable. 94% at 1400. Currently on room air, was on 1L this AM.    Output: Pts ramsey catheter was removed at 1400. Urine in ramsey bag prior to removal was edith and pink tinged. Pt is due to void. Per urology, keep a close watch on the bladder levels with bladder scanned. Check bladder scan at 1800 (4 hours after removal)   Last BM: Pt does not recall the exact day of LBM but she is passing gas and has taken stool softeners   Activity: Pt has not been ambulated this shift. Stood at EOB and tolerated well.    Up for meals? In bed   Skin: Pt has various healed incisions. One on L shoulder, lower back. There is a small blanchable redness area on pt buttock. Seemed to improve throughout shift as pt was on their side and decreased pressure.    Pain: C/o headache. Given PRN tylenol with no relief. PRN ativan given as pt appeared restless and tearful. Pt reported relief following ativan administration   CMS: Intact    Dressing: None    Diet: Regular    LDA: PIV is SL between antibiotic use   Equipment: IV pole, personal belongings    Plan: Pt is due to void this evening. Bladder scan at 1800 if pt has not yet voided. Pt needs to be established on antibiotics course prior to discharge    Additional Info:

## 2021-04-14 NOTE — PROGRESS NOTES
Urology Update Note -(8:10am):    Spoke with [employer] ([phone]).  They are not sure why the micro data isn't crossing over to CareEverywhere.     So far:  - One blood culture was drawn yesterday at [facility].  It is preliminarily growing gram negative bacilli.  More information should be available tomorrow (4/15) at 8:30am, so will plan to call [facility] again tomorrow.     - The urine culture is growing Proteus mirabilis that is resistant to nitrofurantoin and tetracycline and susceptible to everything else (ampicillin, cefazolin, cefepime, ceftriaxone, cipro, gent, levaquin, tobramycin, bactrim)     Will discuss with the Internal Medicine service.     ANDRES Silva [facility]

## 2021-04-14 NOTE — PROGRESS NOTES
"Urology  Progress Note  04/14/2021    - Patient states she \"had a good night\"  - Tmax 101.4 at 1400, afebrile since this time.   - Medicine called yesterday evening as patient was \"cold and clammy\" (although afebrile), blood cxs obtained  - Denies pain  - Catheter draining light pink urine  - Anxious to discharge    Exam  /50 (BP Location: Left arm)   Pulse 93   Temp 98.8  F (37.1  C) (Oral)   Resp 16   Ht 1.6 m (5' 3\")   Wt 111.6 kg (246 lb)   SpO2 92%   BMI 43.58 kg/m    No acute distress  Unlabored breathing  Abdomen soft, nontender, nondistended.   No CVA tenderness  Byrd with light pink urine in tubing    I/O's (last 24/since midnight):  UOP 1150/1075    Labs 4/14 (4/13)  WBC 5.4 (8.1)  Hgb 9.8 (11.2)  Cr 0.83 (1.00)    Blood Cx from OSH: GNRs    Assessment/Plan   55 year old y/o female POD#1 s/p left ureteral stent for ureteral stone + sepsis. Doing well. Appreciate medicine team's input given patient's complex past medical history.    Neuro: Hx of chronic opioid use. PTA Buproprion, fentanyl patch, latuda, triptan, topamax, trazodone, effexor  CV: HDS. PTA diltiazem  Pulm: No issues, continue PTA inhalers  GI/FEN: Regular diet, no MIVF, PPI  : continue catheter until afebrile x 24 hours (likely out this afternoon), PTA myrbetriq. Urology will coordinate outpatient stone management in ~3 weeks once infection is completely treated.   Endo: sliding scale insulin, levothyroxine, leflunomide, modafanil  ID: Switched from Vanc/Gent to Cefepime on 4/13. Blood Cx from OSH growing GNRs. Cultures from N without growth. Appreciate recommendation from medicine team regarding oral options for homegoing.   Activity: Ad katina  PPx: SCDs  Dispo: Today vs tomorrow pending antibiotic plan.    Seen and examined. Will discuss with Dr. Haynes.    Olga Manzo MD  PGY-3 Urology  Pager [phone]     Contacting the Urology Team     Please use the following job codes to reach the Urology Team. Note that you must use an in " house phone and that job codes cannot receive text pages.     On weekdays, dial [phone] (or star-star-star [phone] on the new [employer] telephones) then [phone] to reach the Adult Urology resident or PA on call    On weekdays, dial [phone] (or star-star-star [phone] on the new [employer] telephones) then [phone] to reach the Pediatric Urology resident    On weeknights and weekends, dial [phone] (or star-star-star [phone] on the new [employer] telephones) then [phone] to reach the Urology resident on call (for both Adult and Pediatrics)

## 2021-04-14 NOTE — PLAN OF CARE
VS: Vitals are stable   O2: 2L oxygen with CAPNO   Output: Ramsey patent with slight red urine draining goo amount of urine   Last BM: unknown   Activity: SBA   Skin: Healed incision to left shoulder   Pain: C/O headache, pain meds available   CMS: intact   Dressing: none   Diet: regular   LDA: PIV, ramsey   Equipment: IV pole, PCD's, CAPNO,    Plan: Urologic intervention   Additional Info: Pt has been sweaty and clammy and cold at parts of the shift, a bolus was ordered and given. C/o headache saying maybe it was due to caffeine, coke was ordered and good offered. Headache was not completely resolved after coke, but pt slept a little, using her home CPAP. Woke up again with headache, Toradol and Ativan was given. According to patient, headache has resolved.

## 2021-04-14 NOTE — PROGRESS NOTES
I was called as patient is sweaty, and mohamud  I interviewed and evaluated the patient.   Patient also reports having headache (generalized). Reports she has not drank coke for couple of days.   Order for coke has been done.   Her vital signs and blood glucose level are normal  She is on chronic opiates, and she is receiving here  I think she might be becoming bacteremic, I will order blood culture, and 1 liter NS infusion  She is already on Cefepime currently  Continue to monitor    Niraj Johnson MD  [facility]  Contact information available via [facility] Paging/Directory

## 2021-04-14 NOTE — PROGRESS NOTES
"[facility]    Medicine Progress Note - Hospitalist Service       Date of Admission:  4/13/2021  Assessment & Plan       55 year old female admitted on 4/13/2021. She has a history of psoriatic arthritis, asthma, hx of alcohol abuse, NABOR, GERD, hypothyroidism, depression, anxiety, depression, anxiety, DMII, and chronic pain who was admitted to [facility] due to obstruction L ureteral stones, infected UA, and fever s/p urgent L ureteral stent placement. IM asked to follow for medical co-management.      # Obstructing L ureteral stone s/p urgent L ureteral stent placement  # UTI  Patient presented with 2 days of hematuria and abdominal pain. CT revealed two L ureteral stones with hydronephrosis. UA nitrite and LE positive. WBC 8.2. CRP 14.4, procal 1.99. Tmax 101. S/p L ureteral stent placement 4/13 by Urology without complication.   - Urology primary following the patient  - Continue Gentamycin/Vancomycin per primary team  - Byrd drain in place  - Pain management as below     Chronic pain with chronic opioid use.  Pt has long hx of chronic pain 2/2 multiple spinal surgeries (L4-L% fusion and L3-L4 TLIF and fusion extension), s/p L shoulder replacement 3/5/21, and RA. Has active spinal cor stimulator placed 05/2019. Follows with [facility]. Currently complains of \"pain all over\" concerning for opioid withdrawal.   - Continue Fentanyl 25 mcg and 12 mcg patches every 3 days  - Continue Gabapentin 900 mg TID  - Continue Morphine 15 mg TID prn  - Avoid escalation of narcotics  - No opioids on discharge  - Follow up with pain clinic for ongoing management and refills    Migraine   - Headache resolved   - Imitrex as needed      Psoriatic arthritis. On chronic steroids- Methylprednisolone 4 mg daily, Cosentyx 300 mg every 28 days and leflunomide 20 mg daily. [facility], last visit 3/6/2020.  - Continue Medrol 4 mg daily  - " Continue Leflunomide 20 mg daily  - Follow up with Rheumatology outpatient as previously directed     DMII. Most recent Hgb A1C 5.6 on 4/13/21. Managed PTA on metformin 1,000 mg daily.  - Hold metformin while inpatient  - MSSI  - Moderate consistent carbohydrate diet  - BG stable      Hypothyroidism. Continue Levothyroxine 75 mcg daily.      Depression  Anxiety  Managed PTA on bupropion 300 mg qam, venlafaxine 150 mg daily, latuda 20 mg at bedtime, and topiramate 50 mg BID  - Continue PTA regimen     Esophageal dysmotility. Continue Diltiazem 60 mg TID     Asthma  NABOR  Insomnia  - CPAP at night  - Continue Fluticasone, Advair  - Continue modafinil 200 mg qam       Diet: Regular Diet Adult    DVT Prophylaxis: Defer to primary service  Byrd Catheter: not present  Code Status: Full Code           The patient's care was discussed with the Patient.    Alex Guerrero MD  Hospitalist Service  [facility]  Contact information available via [facility] Paging/Directory    ______________________________________________________________________    Interval History   No acute events overnight.   Headache resolved.   Tremors resolved.   No fever or chills.   No chest pain or palpitations.   No shortness of breath or cough.   No dysuria.     Data reviewed today: I reviewed all medications, new labs and imaging results over the last 24 hours. I personally reviewed no images or EKG's today.    Physical Exam   Vital Signs: Temp: 98.1  F (36.7  C) Temp src: Oral BP: 125/71 Pulse: 94   Resp: 13 SpO2: 93 % O2 Device: Nasal cannula Oxygen Delivery: 1.5 LPM  Weight: 246 lbs 0 oz  General Appearance: Alert, oriented x 3, no acute distress   Respiratory: Lungs CTAB, no wheezing or crackles  Cardiovascular: RRR  GI: Abdomen soft, rotund, mildly tender to palpation in suprapubic region  Musculoskeletal: no joint tenderness or swelling  Neurologic: moves all extremities, no focal  deficits  Psychiatric: mood appropriate       Data   Recent Labs   Lab 04/14/21  0554 04/13/21  1312 04/13/21  1213 04/13/21  0752   WBC 5.4 8.1  --   --    HGB 9.8* 11.2*  --   --    MCV 93 90  --   --    * 167  --   --      --   --  138   POTASSIUM 3.3*  --   --  4.0   CHLORIDE 107  --   --  106   CO2 26  --   --  22   BUN 19  --   --  19   CR 0.83  --  1.00 1.01   ANIONGAP 8  --   --  10   EVELIA 8.2*  --   --  7.9*   *  --   --  128*     No results found for this or any previous visit (from the past 24 hour(s)).  Medications       buPROPion  300 mg Oral QAM     ceFEPIme (MAXIPIME) IV  2 g Intravenous Q8H     cycloSPORINE  1 drop Both Eyes BID     diltiazem  60 mg Oral TID     fentaNYL  12 mcg Transdermal Q72H     fentaNYL  25 mcg Transdermal Q72H     fentaNYL   Transdermal Q8H     fluticasone  1 spray Both Nostrils BID     fluticasone-vilanterol  1 puff Inhalation Daily     gabapentin  900 mg Oral TID     insulin aspart  1-7 Units Subcutaneous TID AC     insulin aspart  1-5 Units Subcutaneous At Bedtime     leflunomide  20 mg Oral Daily     levothyroxine  75 mcg Oral Daily     lidocaine   Transdermal Q8H     lurasidone  20 mg Oral At Bedtime     methylPREDNISolone  8 mg Oral Daily     modafinil  200 mg Oral QAM     omeprazole  20 mg Oral BID AC     polyethylene glycol  17 g Oral Daily     pravastatin  80 mg Oral At Bedtime     senna-docusate  1 tablet Oral BID    Or     senna-docusate  2 tablet Oral BID     topiramate  50 mg Oral BID     traZODone  100 mg Oral At Bedtime     varenicline  1 mg Oral BID w/meals     venlafaxine  150 mg Oral Daily with breakfast

## 2021-04-15 ENCOUNTER — PATIENT OUTREACH (OUTPATIENT)
Dept: CARE COORDINATION | Facility: CLINIC | Age: 56
End: 2021-04-15

## 2021-04-15 DIAGNOSIS — N32.89 BLADDER SPASMS: ICD-10-CM

## 2021-04-15 DIAGNOSIS — N20.0 CALCIUM NEPHROLITHIASIS: Primary | ICD-10-CM

## 2021-04-15 LAB
ANION GAP SERPL CALCULATED.3IONS-SCNC: 8 MMOL/L (ref 3–14)
BUN SERPL-MCNC: 20 MG/DL (ref 7–30)
CALCIUM SERPL-MCNC: 8.4 MG/DL (ref 8.5–10.1)
CHLORIDE SERPL-SCNC: 108 MMOL/L (ref 94–109)
CO2 SERPL-SCNC: 24 MMOL/L (ref 20–32)
CREAT SERPL-MCNC: 0.89 MG/DL (ref 0.52–1.04)
ERYTHROCYTE [DISTWIDTH] IN BLOOD BY AUTOMATED COUNT: 15.9 % (ref 10–15)
GFR SERPL CREATININE-BSD FRML MDRD: 73 ML/MIN/{1.73_M2}
GLUCOSE BLDC GLUCOMTR-MCNC: 110 MG/DL (ref 70–99)
GLUCOSE BLDC GLUCOMTR-MCNC: 181 MG/DL (ref 70–99)
GLUCOSE BLDC GLUCOMTR-MCNC: 81 MG/DL (ref 70–99)
GLUCOSE SERPL-MCNC: 108 MG/DL (ref 70–99)
HCT VFR BLD AUTO: 29.4 % (ref 35–47)
HGB BLD-MCNC: 9.1 G/DL (ref 11.7–15.7)
MCH RBC QN AUTO: 27.4 PG (ref 26.5–33)
MCHC RBC AUTO-ENTMCNC: 31 G/DL (ref 31.5–36.5)
MCV RBC AUTO: 89 FL (ref 78–100)
PLATELET # BLD AUTO: 119 10E9/L (ref 150–450)
POTASSIUM SERPL-SCNC: 4.2 MMOL/L (ref 3.4–5.3)
RBC # BLD AUTO: 3.32 10E12/L (ref 3.8–5.2)
SODIUM SERPL-SCNC: 140 MMOL/L (ref 133–144)
WBC # BLD AUTO: 4.1 10E9/L (ref 4–11)

## 2021-04-15 PROCEDURE — 99232 SBSQ HOSP IP/OBS MODERATE 35: CPT | Performed by: INTERNAL MEDICINE

## 2021-04-15 PROCEDURE — 85027 COMPLETE CBC AUTOMATED: CPT | Performed by: STUDENT IN AN ORGANIZED HEALTH CARE EDUCATION/TRAINING PROGRAM

## 2021-04-15 PROCEDURE — 250N000012 HC RX MED GY IP 250 OP 636 PS 637: Performed by: PHYSICIAN ASSISTANT

## 2021-04-15 PROCEDURE — 250N000013 HC RX MED GY IP 250 OP 250 PS 637: Performed by: STUDENT IN AN ORGANIZED HEALTH CARE EDUCATION/TRAINING PROGRAM

## 2021-04-15 PROCEDURE — 999N001017 HC STATISTIC GLUCOSE BY METER IP

## 2021-04-15 PROCEDURE — 36573 INSJ PICC RS&I 5 YR+: CPT

## 2021-04-15 PROCEDURE — 99207 PR CDG-MDM COMPONENT: MEETS MODERATE - DOWN CODED: CPT | Performed by: INTERNAL MEDICINE

## 2021-04-15 PROCEDURE — 80048 BASIC METABOLIC PNL TOTAL CA: CPT | Performed by: STUDENT IN AN ORGANIZED HEALTH CARE EDUCATION/TRAINING PROGRAM

## 2021-04-15 PROCEDURE — 250N000013 HC RX MED GY IP 250 OP 250 PS 637: Performed by: PHYSICIAN ASSISTANT

## 2021-04-15 PROCEDURE — 250N000009 HC RX 250: Performed by: PHYSICIAN ASSISTANT

## 2021-04-15 PROCEDURE — 250N000011 HC RX IP 250 OP 636: Performed by: STUDENT IN AN ORGANIZED HEALTH CARE EDUCATION/TRAINING PROGRAM

## 2021-04-15 PROCEDURE — 36415 COLL VENOUS BLD VENIPUNCTURE: CPT | Performed by: STUDENT IN AN ORGANIZED HEALTH CARE EDUCATION/TRAINING PROGRAM

## 2021-04-15 RX ORDER — HEPARIN SODIUM,PORCINE 10 UNIT/ML
2-5 VIAL (ML) INTRAVENOUS
Status: DISCONTINUED | OUTPATIENT
Start: 2021-04-15 | End: 2021-04-15 | Stop reason: HOSPADM

## 2021-04-15 RX ORDER — LIDOCAINE 40 MG/G
CREAM TOPICAL
Status: DISCONTINUED | OUTPATIENT
Start: 2021-04-15 | End: 2021-04-15 | Stop reason: HOSPADM

## 2021-04-15 RX ORDER — SOLIFENACIN SUCCINATE 5 MG/1
5 TABLET, FILM COATED ORAL DAILY PRN
Qty: 45 TABLET | Refills: 0 | Status: SHIPPED | OUTPATIENT
Start: 2021-04-15 | End: 2021-05-13

## 2021-04-15 RX ORDER — CEFDINIR 300 MG/1
300 CAPSULE ORAL 2 TIMES DAILY
Qty: 42 CAPSULE | Refills: 0 | Status: SHIPPED | OUTPATIENT
Start: 2021-04-23 | End: 2021-04-15

## 2021-04-15 RX ORDER — CEFTRIAXONE 2 G/1
2 INJECTION, POWDER, FOR SOLUTION INTRAMUSCULAR; INTRAVENOUS EVERY 24 HOURS
Status: DISCONTINUED | OUTPATIENT
Start: 2021-04-15 | End: 2021-04-15 | Stop reason: HOSPADM

## 2021-04-15 RX ORDER — OXYBUTYNIN CHLORIDE 5 MG/1
5 TABLET ORAL 3 TIMES DAILY PRN
Qty: 45 TABLET | Refills: 1 | Status: SHIPPED | OUTPATIENT
Start: 2021-04-15 | End: 2021-04-15

## 2021-04-15 RX ORDER — CEFDINIR 300 MG/1
300 CAPSULE ORAL 2 TIMES DAILY
Qty: 42 CAPSULE | Refills: 0 | Status: SHIPPED | OUTPATIENT
Start: 2021-04-23 | End: 2021-06-25

## 2021-04-15 RX ORDER — OXYBUTYNIN CHLORIDE 5 MG/1
5 TABLET ORAL 3 TIMES DAILY PRN
Status: DISCONTINUED | OUTPATIENT
Start: 2021-04-15 | End: 2021-04-15 | Stop reason: HOSPADM

## 2021-04-15 RX ORDER — CEFTRIAXONE 2 G/1
2 INJECTION, POWDER, FOR SOLUTION INTRAMUSCULAR; INTRAVENOUS EVERY 24 HOURS
Qty: 140 ML | Refills: 0 | Status: SHIPPED | OUTPATIENT
Start: 2021-04-16 | End: 2021-04-23

## 2021-04-15 RX ADMIN — FLUTICASONE FUROATE AND VILANTEROL TRIFENATATE 1 PUFF: 100; 25 POWDER RESPIRATORY (INHALATION) at 07:54

## 2021-04-15 RX ADMIN — BUPROPION HYDROCHLORIDE 300 MG: 300 TABLET, FILM COATED, EXTENDED RELEASE ORAL at 07:50

## 2021-04-15 RX ADMIN — GABAPENTIN 900 MG: 300 CAPSULE ORAL at 13:52

## 2021-04-15 RX ADMIN — METHYLPREDNISOLONE 8 MG: 8 TABLET ORAL at 07:51

## 2021-04-15 RX ADMIN — LEFLUNOMIDE 20 MG: 20 TABLET ORAL at 07:52

## 2021-04-15 RX ADMIN — VENLAFAXINE HYDROCHLORIDE 150 MG: 150 CAPSULE, EXTENDED RELEASE ORAL at 07:51

## 2021-04-15 RX ADMIN — LEVOTHYROXINE SODIUM 75 MCG: 75 TABLET ORAL at 07:50

## 2021-04-15 RX ADMIN — CEFEPIME HYDROCHLORIDE 2 G: 2 INJECTION, POWDER, FOR SOLUTION INTRAVENOUS at 08:05

## 2021-04-15 RX ADMIN — OMEPRAZOLE 20 MG: 20 CAPSULE, DELAYED RELEASE ORAL at 07:50

## 2021-04-15 RX ADMIN — MORPHINE SULFATE 15 MG: 15 TABLET ORAL at 10:59

## 2021-04-15 RX ADMIN — VARENICLINE TARTRATE 1 MG: 1 TABLET, FILM COATED ORAL at 07:51

## 2021-04-15 RX ADMIN — MODAFINIL 200 MG: 200 TABLET ORAL at 08:05

## 2021-04-15 RX ADMIN — TOPIRAMATE 50 MG: 25 TABLET, FILM COATED ORAL at 07:52

## 2021-04-15 RX ADMIN — CYCLOSPORINE 1 DROP: 0.5 EMULSION OPHTHALMIC at 07:52

## 2021-04-15 RX ADMIN — FLUTICASONE PROPIONATE 1 SPRAY: 50 SPRAY, METERED NASAL at 07:51

## 2021-04-15 RX ADMIN — DILTIAZEM HYDROCHLORIDE 60 MG: 60 TABLET, FILM COATED ORAL at 13:53

## 2021-04-15 RX ADMIN — GABAPENTIN 900 MG: 300 CAPSULE ORAL at 07:50

## 2021-04-15 RX ADMIN — ACETAMINOPHEN 650 MG: 325 TABLET, FILM COATED ORAL at 08:38

## 2021-04-15 RX ADMIN — DILTIAZEM HYDROCHLORIDE 60 MG: 60 TABLET, FILM COATED ORAL at 07:50

## 2021-04-15 RX ADMIN — LIDOCAINE HYDROCHLORIDE 2 ML: 10 INJECTION, SOLUTION EPIDURAL; INFILTRATION; INTRACAUDAL; PERINEURAL at 15:00

## 2021-04-15 ASSESSMENT — ACTIVITIES OF DAILY LIVING (ADL)
TOILETING_ISSUES: NO
WALKING_OR_CLIMBING_STAIRS_DIFFICULTY: NO
DIFFICULTY_COMMUNICATING: NO
DRESSING/BATHING_DIFFICULTY: NO
WEAR_GLASSES_OR_BLIND: NO
DIFFICULTY_EATING/SWALLOWING: NO
FALL_HISTORY_WITHIN_LAST_SIX_MONTHS: NO
DOING_ERRANDS_INDEPENDENTLY_DIFFICULTY: NO
CONCENTRATING,_REMEMBERING_OR_MAKING_DECISIONS_DIFFICULTY: NO

## 2021-04-15 NOTE — PROGRESS NOTES
Urology Progress Note:    Received fax of blood culture results from [facility], 4/12/21, for Ms. Gibbons.     These show:  Proteus Mirabilis in 1/1 blood cultures from 4/12/21  Amp <4 S  Ampsul <1 S  Cefazolin 4 S  Cefepime <1 S  Ceftriaxone <1 S  Cipro <0.25 S  Gent <2 S  Levaquin <0.5 S  Piptaz <2/4 S  Tetracycline RESISTANT  Tobra <2 S  Bactrim <0.5 S    Repeat blood cultures from 4/13/21 were negative.     Confirmed with patient:  - Moxifloxacin caused anaphylaxis and patient needed epinephrine to treat  - Bactrim and cefaclor caused hives.  But she has since tolerated keflex and cefepime    Spoke with ID (Estrella Mitchell MD) - initially placed a formal consult but ultimately felt comfortable to curbside recs as seen in this note.     ID recommends:  - IV antibiotics for 10d from the first negative blood cultures (until 4/23/21).  Recommend changing from cefepime to ceftriaxone 2g q24 hours, so last dose will be administered on 4/22/21.      - After completion of IV antibiotics will continue therapeutic dosing of ORAL cefdinir or cefpodoxime to extend until 3 days after her stone procedure.        Given this:  - Will stop cefepime and give 1 dose of ceftriaxone today  - Will place PICC  - Will consult Care Coordinator to explore outpatient IV antibiotic options  - Discharge potentially today, pending coordination of above    ANDR[facility]  [phone]

## 2021-04-15 NOTE — PLAN OF CARE
VS: VSS, pt denied CP or SOB.   O2: Room air sat. > 90%.   Output: Voiding okay some frequency noted, small PVR   Last BM: 04/14/21   Activity: Up to bathroom with SBA.   Skin: Intact    Pain: Complaining of some headache recently tylenol po given will continue to monitor.    CMS: CMS and neuro intact.   Dressing: None.   Diet: Regular tolerating okay.    LDA: PIV SL.    Equipment: Commode, IV pole and personal belongings.    Plan: TBD.    Additional Info:

## 2021-04-15 NOTE — PROGRESS NOTES
"Urology  Progress Note  04/15/2021    - Patient fees very well this morning  - Urinating without issue after catheter removal  - Afebrile since 4/13  - Having urinary frequency with stent in place.    Exam  /59 (BP Location: Left arm)   Pulse 75   Temp 96.7  F (35.9  C) (Oral)   Resp 16   Ht 1.6 m (5' 3\")   Wt 111.6 kg (246 lb)   SpO2 98%   BMI 43.58 kg/m    No acute distress  Unlabored breathing  Abdomen soft, nontender, nondistended.   No CVA tenderness    I/O's (last 24/since midnight):  UOP 2775/175    Labs 4/14 (4/13)  WBC 4.1 (5.4)  Hgb 9.1 (9.8)  Cr pending (0.83)    Blood Cx from OSH: GNRs, awaiting final sensitivities    Assessment/Plan   55 year old y/o female POD#2 s/p left ureteral stent for ureteral stone + sepsis. Doing well. Appreciate medicine team's input given patient's complex past medical history.    Neuro: Hx of chronic opioid use. PTA Buproprion, fentanyl patch, latuda, triptan, topamax, trazodone, effexor  CV: HDS. PTA diltiazem  Pulm: No issues, continue PTA inhalers  GI/FEN: Regular diet, no MIVF, PPI  : Byrd removed 4/14. Urology will coordinate outpatient stone management in ~3 weeks once infection is completely treated. Will discharge with oxybutynin for urinary frequency in the setting of her stent.  Endo: sliding scale insulin, levothyroxine, leflunomide, modafanil  ID: Switched from Vanc/Gent to Cefepime on 4/13. Blood Cx from OSH growing GNRs. Cultures from N without growth. Appreciate recommendation from medicine team regarding oral options for homegoing. Will touch base with OSH for sensitivities.   Activity: Ad katina  PPx: SCDs  Dispo: Home today.    Seen and examined. Will discuss with Dr. Haynes.    Olga Manzo MD  PGY-3 Urology  Pager [phone]     Contacting the Urology Team     Please use the following job codes to reach the Urology Team. Note that you must use an in house phone and that job codes cannot receive text pages.     On weekdays, dial [phone] (or " star-star-star [phone] on the new [place] telephones) then [phone] to reach the Adult Urology resident or PA on call    On weekdays, dial [phone] (or star-star-star [phone] on the new [place] telephones) then [phone] to reach the Pediatric Urology resident    On weeknights and weekends, dial [phone] (or star-star-star [phone] on the new [place] telephones) then [phone] to reach the Urology resident on call (for both Adult and Pediatrics)

## 2021-04-15 NOTE — PLAN OF CARE
"VS: BP (!) 140/83 (BP Location: Left arm)   Pulse 94   Temp 98.2  F (36.8  C) (Oral)   Resp 16   Ht 1.6 m (5' 3\")   Wt 111.6 kg (246 lb)   SpO2 99%   BMI 43.58 kg/m     O2: Room air saturations 97%.    Output: Up to bathroom to void. Denies any issues with voiding.    Last BM: 4/14/21 reports patient   Activity: Up ad katina   Skin: A lot of fragile and bruised skin on arms.    Pain: Pt complaining of pain in shoulders and back.    CMS: Intact   Dressing: NA   Diet: Regular/Carb counting.    LDA: IV removed and PICC placed. Extension added.    Equipment: Isolation/IS/Checking Blood sugars before meals.    Plan: Plan is for patient to be discharged to home with [facility].  Antibionts will be delivered to her home.    Additional Info: Patient lives at home with her son. Son will be patients transportation to home via car. Status of patient will continue to be monitored. Per [facility] and CC note, last does of abx given at 0900 and next dose to be given tomorrow by [facility] nurse        Pt. discharged at 1730 to home Pt. was accompanied by son, and left with personal belongings. Pt. received complete discharge paperwork and medications as filled by discharge pharmacy. Pt. was given times of last dose for all discharge medications in writing on discharge medication sheets.  Discharge teaching included medication, pain management, activity restrictions, dressing changes, and signs and symptoms of infection. [facility] arrived to do PICC teachings. Pt. had no further questions at the time of discharge and no unmet needs were identified.   "

## 2021-04-15 NOTE — PROCEDURES
Reviewed picc procedure with patient including side effects, weight restriction, and keeping dressing dry.  Time out performed with Elaine RN and consent signed.  #4 single lumen solo picc placed in right basilic vein with first attempt.  Per 3CG technology tip terminates at the svc/ra junction.  securacath placed.  Report given to Matt WATTS that picc line can be used, but patient will need extension tubing.  Patient also aware of this and will let [facility] know.  Flushing orders released from epic.

## 2021-04-15 NOTE — CONSULTS
Care Management Initial Consult    General Information  Assessment completed with:  Swathi ALANIS assessment for discharge needs   Per Chart:  55 year old y/o female POD#1 s/p left ureteral stent for ureteral stone + sepsis  Cystourethroscopy with left retrograde pyelography/Placement of left ureteral        Primary Care Provider verified and updated as needed:   Verified PCP  Readmission within the last 30 days:   No        Advance Care Planning:            Communication Assessment  Patient's communication style: spoken language (English or Bilingual)    Hearing Difficulty or Deaf: no        Cognitive  Cognitive/Neuro/Behavioral: WDL  Level of Consciousness: alert  Arousal Level: opens eyes spontaneously  Orientation: oriented x 4  Mood/Behavior: anxious  Best Language: 0 - No aphasia  Speech: spontaneous    Living Environment:   People in home:    2 (lives with son)   Current living Arrangements:    house    Able to return to prior arrangements:  yes       Family/Social Support:  Care provided by:  self  Provides care for:  No one else                Description of Support System:    Feels she has adequate support sytem, lives with son, however he works during the day       Current Resources:   Patient receiving home care services:  No     Community Resources:    Equipment currently used at home:  None  Supplies currently used at home:  None    Employment/Financial:  Employment Status:     N/A     Financial Concerns:   Does not have coveragefor IV ABX at home, would prefer home IV ABX if she can pay over time.          Lifestyle & Psychosocial Needs:        Socioeconomic History     Marital status:      Spouse name: Not on file     Number of children: Not on file     Years of education: Not on file     Highest education level: Not on file     Tobacco Use     Smoking status: Former Smoker     Packs/day: 0.50     Types: Cigarettes     Quit date: 2/5/2018     Years since quitting: 3.1     Smokeless tobacco:  Never Used   Substance and Sexual Activity     Alcohol use: No     Frequency: Never     Drug use: No       Functional Status:  Prior to admission patient needed assistance:     Independent with ADL's             Mental Health Status:        WNL    Chemical Dependency Status:            No apparent H/O    Values/Beliefs:  Spiritual, Cultural Beliefs, Episcopal Practices, Values that affect care:       No          Additional Information:  This RNCC spoke with Swathi, introduced role of RNCC and discussed discharge planning. Patient is aware of need for hme IV ABX.Patient was informed that Medicare does not cover IV ABX at home and went over  Options of TCU, OP infusion or home as a self pay. Patient would prefer to do at home if she can pay for IV ABX over time. Recieved permission from patient to send referral to [facility] for a self pay quote.     This RNCC spoke with TXment team Danisha MONTERO (mobile )  (pager [phone]) , ABX plan is for 2 GM of Ceftriaxone Q24, received dose to day at 9 am, would require 4/16/21 with last dose 4/22/21.     [facility]  referral given to [facility]  , confirmed they will take payments. Will await call back with quote.      ADDENDUM:  Patient will DC today, needs PICC placed, [facility] will meet with patient at 3 PM for bedside teaching. No cost for medication  Will have a $15 days co pay for supplies for total of $105.00 for the 7 days.    Patient will have son pick her up when he is off work.       Terri TATEN RN CCM  RN Care Coordinator [address] [place] [address]. [place] [address]  [email or web address]   Office: (10a) [phone]   Pager: [phone]    To contact Weekend RNCC, dial [phone] and enter job code [phone] at prompt. This pager can not be contacted by text page or outside line.

## 2021-04-15 NOTE — PROGRESS NOTES
[facility]    Medicine Progress Note - Hospitalist Service       Date of Admission:  4/13/2021  Assessment & Plan       55 year old female admitted on 4/13/2021. She has a history of psoriatic arthritis, asthma, hx of alcohol abuse, NABOR, GERD, hypothyroidism, depression, anxiety, depression, anxiety, DMII, and chronic pain who was admitted to [facility] due to obstruction L ureteral stones, infected UA, and fever s/p urgent L ureteral stent placement. IM asked to follow for medical co-management.      # Obstructing L ureteral stone s/p urgent L ureteral stent placement  # UTI  - Urology following the patient  - Infection appears to be improving, she is afebrile and hemodynamically stable   - Byrd removed without complications  - Patient will get a PICC line and continue on IV Ceftriaxone as outpatient. No hx of substance abuse, pt refused using any drugs. She quit drinking > 6 yrs ago and smoking > 2 yrs ago.       Chronic pain with chronic opioid use.   - Continue Fentanyl 25 mcg and 12 mcg patches every 3 days  - Continue Gabapentin 900 mg TID  - Continue Morphine 15 mg TID prn  - Avoid escalation of narcotics  - No opioids on discharge  - Follow up with pain clinic for ongoing management and refills    Migraine   - Headache resolved   - Imitrex as needed      Psoriatic arthritis. On chronic steroids- Methylprednisolone 4 mg daily, Cosentyx 300 mg every 28 days and leflunomide 20 mg daily. [facility] with [facility]ology [facility], last visit 3/6/2020.  - Continue Medrol 4 mg daily  - Continue Leflunomide 20 mg daily  - Follow up with Rheumatology outpatient as previously directed     DMII. Most recent Hgb A1C 5.6 on 4/13/21.  - Resume PTA metformin on discharge       Hypothyroidism. Continue Levothyroxine 75 mcg daily.      Depression  Anxiety  Managed PTA on bupropion 300 mg qam, venlafaxine 150 mg daily, latuda 20 mg at bedtime, and topiramate 50 mg BID  -  Continue PTA regimen     Esophageal dysmotility. Continue Diltiazem 60 mg TID     Asthma  NABOR  Insomnia  - CPAP at night  - Continue Fluticasone, Advair  - Continue modafinil 200 mg qam       Diet: Regular Diet Adult    DVT Prophylaxis: Defer to primary service  Byrd Catheter: not present  Code Status: Full Code           The patient's care was discussed with the Patient.    Alex Guerrero MD  Hospitalist Service  [facility]  Contact information available via [facility] Paging/Directory    ______________________________________________________________________    Interval History   No acute events overnight.   Headache resolved.   No fever or chills.   No chest pain or palpitations.   No shortness of breath or cough.   No dysuria.     Data reviewed today: I reviewed all medications, new labs and imaging results over the last 24 hours. I personally reviewed no images or EKG's today.    Physical Exam   Vital Signs: Temp: 97.9  F (36.6  C) Temp src: Oral BP: 126/74 Pulse: 80   Resp: 15 SpO2: 96 % O2 Device: None (Room air) Oxygen Delivery: 1.5 LPM  Weight: 246 lbs 0 oz  General Appearance: Alert, oriented x 3, no acute distress   Respiratory: Lungs CTAB, no wheezing or crackles  Cardiovascular: RRR  GI: Abdomen soft, rotund, mildly tender to palpation in suprapubic region  Musculoskeletal: no joint tenderness or swelling  Neurologic: moves all extremities, no focal deficits  Psychiatric: mood appropriate       Data   Recent Labs   Lab 04/15/21  0703 04/14/21  2250 04/14/21  1650 04/14/21  0554 04/13/21  1312 04/13/21  1213 04/13/21  0752   WBC 4.1  --   --  5.4 8.1  --   --    HGB 9.1*  --   --  9.8* 11.2*  --   --    MCV 89  --   --  93 90  --   --    *  --   --  134* 167  --   --      --   --  141  --   --  138   POTASSIUM 4.2 4.1 3.9 3.3*  --   --  4.0   CHLORIDE 108  --   --  107  --   --  106   CO2 24  --   --  26  --   --  22   BUN 20  --   --  19  --    --  19   CR 0.89  --   --  0.83  --  1.00 1.01   ANIONGAP 8  --   --  8  --   --  10   EVELIA 8.4*  --   --  8.2*  --   --  7.9*   *  --   --  108*  --   --  128*     No results found for this or any previous visit (from the past 24 hour(s)).  Medications       buPROPion  300 mg Oral QAM     ceFEPIme (MAXIPIME) IV  2 g Intravenous Q8H     cycloSPORINE  1 drop Both Eyes BID     diltiazem  60 mg Oral TID     fentaNYL  12 mcg Transdermal Q72H     fentaNYL  25 mcg Transdermal Q72H     fentaNYL   Transdermal Q8H     fluticasone  1 spray Both Nostrils BID     fluticasone-vilanterol  1 puff Inhalation Daily     gabapentin  900 mg Oral TID     insulin aspart  1-7 Units Subcutaneous TID AC     insulin aspart  1-5 Units Subcutaneous At Bedtime     leflunomide  20 mg Oral Daily     levothyroxine  75 mcg Oral Daily     lidocaine   Transdermal Q8H     lurasidone  20 mg Oral At Bedtime     methylPREDNISolone  8 mg Oral Daily     modafinil  200 mg Oral QAM     omeprazole  20 mg Oral BID AC     polyethylene glycol  17 g Oral Daily     pravastatin  80 mg Oral At Bedtime     senna-docusate  1 tablet Oral BID    Or     senna-docusate  2 tablet Oral BID     topiramate  50 mg Oral BID     traZODone  100 mg Oral At Bedtime     varenicline  1 mg Oral BID w/meals     venlafaxine  150 mg Oral Daily with breakfast

## 2021-04-15 NOTE — PLAN OF CARE
Pt A&O x's 4. VSS. Afebrile. 02 sats in the 90s on RA. Lungs clear. Denies SOB, chest pain or nausea. Tolerating regular diet. Bowel sound active in all quadrants. last BM yesterday, passing gas. Voiding adequate amount into the toilet.denies hematuria. Pt is up with stand by assist.  Toradol administered for headache with good effect. CMS intact, denies N/T. Right fore arm PIV patent and TKO. Slept between care and is able to make needs known, call light with in reach. Will continue to monitor.   Urine concentrated. Po fluid encouraged and provided

## 2021-04-15 NOTE — PROGRESS NOTES
"  VS: /86 (BP Location: Right arm)   Pulse 106   Temp 97.4  F (36.3  C) (Oral)   Resp 14   Ht 1.6 m (5' 3\")   Wt 111.6 kg (246 lb)   SpO2 96%   BMI 43.58 kg/m     O2: Room air saturations 97%.    Output: Up to bathroom to void. Denies any issues with voiding.    Last BM: 4/14/21 reports patient   Activity: Up ad katina   Skin: A lot of fragile and bruised skin on arms.    Pain: Pt complaining of pain in shoulders and back.    CMS: Intact   Dressing: NA   Diet: Regular/Carb counting.    LDA: IV SL was discontinued.    Equipment: Isolation/IS/Checking Blood sugars before meals.    Plan: Plan is for patient to be discharged to home. However patient needs to have a PICC line placed prior to going home. Meeting with [facility] care at 3 pm to teach about PICC line cares and infusion of antibiotics. . Antibionts will be delivered to her home.    Additional Info: Patient lives at home with her son. Son will be patients transportation to home via car. Status of patient will continue to be monitored.        "

## 2021-04-15 NOTE — DISCHARGE SUMMARY
[facility] UroDischarge Summary    Patient: Swathi Gibbons    MRN: [id number]   : 1965         Date of Admission:  2021   Date of Discharge::  4/15/2021  Admitting Physician:  Luis Antonio Haynes MD  Discharge Physician:  Ele Pearson PA-C             Admission Diagnoses:   Left ureteral stone [N20.1]    Past Medical History:   Diagnosis Date     Alcohol use disorder, severe, in sustained remission (H)      Anxiety and depression      Arthritis     PSORIATIC     Chronic rhinitis      Chronic rhinitis      COPD (chronic obstructive pulmonary disease) (H)      Diabetes (H)     Type II     Esophageal dysmotility      EtOH dependence (H)      Gastroesophageal reflux disease      Hypertension      Hypothyroidism      Latent tuberculosis      MRSA (methicillin resistant staph aureus) culture positive      Nodular goiter      NABOR (obstructive sleep apnea)     C-PAP     Other chronic pain     back pain     Psoriasis      Secondary hyperparathyroidism (H)      Tracheostomy in place (H)      Uncomplicated asthma      Vitamin D deficiency              Discharge Diagnosis:     Left ureteral stone [N20.1]  Left hydronephrosis  Left pyelonephritis due to Proteus mirabilis  Bacteremia due to Proteus mirabilis    Past Medical History:   Diagnosis Date     Alcohol use disorder, severe, in sustained remission (H)      Anxiety and depression      Arthritis     PSORIATIC     Chronic rhinitis      Chronic rhinitis      COPD (chronic obstructive pulmonary disease) (H)      Diabetes (H)     Type II     Esophageal dysmotility      EtOH dependence (H)      Gastroesophageal reflux disease      Hypertension      Hypothyroidism      Latent tuberculosis      MRSA (methicillin resistant staph aureus) culture positive      Nodular goiter      NABOR (obstructive sleep apnea)     C-PAP     Other chronic pain     back pain     Psoriasis      Secondary hyperparathyroidism (H)      Tracheostomy in place (H)       Uncomplicated asthma      Vitamin D deficiency             Procedures:     Procedure(s): 4/13/21 -   PROCEDURES PERFORMED:   1. Cystourethroscopy with left retrograde pyelography  2. Placement of left ureteral stent.  3. Intraoperative interpretation of fluoroscopic imaging.   Dr. Luis Antonio Haynes (Urology)            Medications Prior to Admission:     Medications Prior to Admission   Medication Sig Dispense Refill Last Dose     acetaminophen (TYLENOL) 325 MG tablet Take 325-650 mg by mouth every 4 hours as needed for mild pain        calcitonin, salmon, (MIACALCIN) 200 UNIT/ACT nasal spray Spray 1 spray into one nostril alternating nostrils daily Alternate nostril each day.        Diclofenac-Na Hyaluron-Niacin 3-2-4 % GEL Externally apply 1-4 g topically 4 times daily as needed        doxycycline hyclate (VIBRAMYCIN) 50 MG capsule Take 50 mg by mouth every evening        fentaNYL (DURAGESIC) 12 mcg/hr 72 hr patch Place 1 patch onto the skin every 72 hours remove old patch.        fentaNYL (DURAGESIC) 25 mcg/hr 72 hr patch Place 1 patch onto the skin every 72 hours remove old patch.        ferrous sulfate (FEROSUL) 325 (65 Fe) MG tablet Take 325 mg by mouth daily (with breakfast)        fish oil-omega-3 fatty acids 1000 MG capsule Take 2 g by mouth 2 times daily        fluconazole (DIFLUCAN) 200 MG tablet Take 200 mg by mouth daily as needed        fluticasone-salmeterol (ADVAIR) 100-50 MCG/DOSE inhaler Inhale 1 puff into the lungs every 12 hours        furosemide (LASIX) 20 MG tablet Take 20 mg by mouth every other day        hydrOXYzine (ATARAX) 25 MG tablet Take 25 mg by mouth every 4 hours as needed for anxiety or other (pain)        imiquimod (ALDARA) 5 % external cream Apply topically nightly as needed        lidocaine-prilocaine (EMLA) 2.5-2.5 % external cream Apply topically as needed for moderate pain        lurasidone (LATUDA) 20 MG TABS tablet Take 20 mg by mouth At Bedtime         NONFORMULARY Take 500 mg  by mouth daily as needed Rajat leaf extract        polyethylene glycol (MIRALAX) 17 g packet Take 1 packet by mouth daily        polyvinyl alcohol (LIQUIFILM TEARS) 1.4 % ophthalmic solution Place 1 drop into both eyes 3 times daily as needed for dry eyes        pravastatin (PRAVACHOL) 80 MG tablet Take 80 mg by mouth At Bedtime        senna-docusate (SENOKOT-S/PERICOLACE) 8.6-50 MG tablet Take 1-2 tablets by mouth 2 times daily        venlafaxine (EFFEXOR-XR) 150 MG 24 hr capsule Take 150 mg by mouth daily        albuterol (PROAIR HFA/PROVENTIL HFA/VENTOLIN HFA) 108 (90 Base) MCG/ACT inhaler Inhale 2 puffs into the lungs every 4 hours as needed for shortness of breath / dyspnea or wheezing (VENTOLIN)        buPROPion (WELLBUTRIN XL) 300 MG 24 hr tablet Take 300 mg by mouth every morning         cycloSPORINE (RESTASIS) 0.05 % ophthalmic emulsion Place 1 drop into both eyes 2 times daily        diltiazem (CARDIZEM) 60 MG tablet Take 60 mg by mouth 3 times daily        fluticasone (FLONASE) 50 MCG/ACT nasal spray Spray 2 sprays into both nostrils daily         gabapentin (NEURONTIN) 300 MG capsule Take 900 mg by mouth 3 times daily (300MG X 3 = 900MG)        leflunomide (ARAVA) 20 MG tablet Take 20 mg by mouth every evening         levothyroxine (SYNTHROID/LEVOTHROID) 75 MCG tablet Take 75 mcg by mouth daily        Lidocaine (LIDOCARE) 4 % Patch Place 2 patches onto the skin every 12 hours as needed for moderate pain        metFORMIN (GLUCOPHAGE-XR) 500 MG 24 hr tablet Take 1,000 mg by mouth daily (with breakfast) (500MG X 2 = 1,000MG)        methylPREDNISolone (MEDROL) 4 MG tablet Take 8 mg by mouth daily         modafinil (PROVIGIL) 200 MG tablet Take 200 mg by mouth daily as needed         morphine (MSIR) 15 MG IR tablet Take 15-30 mg by mouth 2 times daily as needed for severe pain         multivitamin, therapeutic (THERA-VIT) TABS tablet Take 1 tablet by mouth daily        naloxone (NARCAN) 4 MG/0.1ML nasal  spray Spray 4 mg into one nostril alternating nostrils once as needed for opioid reversal every 2-3 minutes until assistance arrives        nystatin (MYCOSTATIN) 924820 UNIT/GM external cream Apply topically 2 times daily as needed for dry skin         nystatin (MYCOSTATIN) 854001 UNIT/GM external powder Apply topically 4 times daily as needed Under abdominal wall         omeprazole (PRILOSEC) 20 MG DR capsule Take 20 mg by mouth 2 times daily         oxyCODONE (OXYCONTIN) 15 MG 12 hr tablet Take 15 mg by mouth 3 times daily        phentermine (ADIPEX-P) 37.5 MG tablet Take 18.75 mg by mouth daily (37.5MG X 0.5 = 18.75MG)        secukinumab (COSENTYX) 150 MG/ML Sensoready pen Inject 300 mg Subcutaneous every 28 days        topiramate (TOPAMAX) 50 MG tablet Take 50 mg by mouth 2 times daily        traZODone (DESYREL) 100 MG tablet Take 100 mg by mouth At Bedtime         varenicline (CHANTIX) 1 MG tablet Take 1 mg by mouth 2 times daily        vitamin D2 (ERGOCALCIFEROL) 87175 units (1250 mcg) capsule Take 50,000 Units by mouth four times a week         Xylitol 500 MG DISK Take 1 Dose by mouth every evening as needed                Discharge Medications:     Current Discharge Medication List      START taking these medications    Details   cefdinir (OMNICEF) 300 MG capsule Take 1 capsule (300 mg) by mouth 2 times daily (start after completing IV antibiotics, continue until 3 days after urologic procedure)  Qty: 42 capsule, Refills: 0    Associated Diagnoses: Left ureteral stone; Bacteremia due to Gram-negative bacteria; Urinary tract infection due to Proteus      cefTRIAXone (ROCEPHIN) 2 GM vial Inject 2 g into the vein every 24 hours for 7 days (please remove PICC after completion of antibiotics)  Qty: 140 mL, Refills: 0    Associated Diagnoses: Bacteremia due to Gram-negative bacteria; Urinary tract infection due to Proteus      oxybutynin (DITROPAN) 5 MG tablet Take 1 tablet (5 mg) by mouth 3 times daily as needed  for bladder spasms  Qty: 45 tablet, Refills: 1    Associated Diagnoses: Left ureteral stone         CONTINUE these medications which have NOT CHANGED    Details   acetaminophen (TYLENOL) 325 MG tablet Take 325-650 mg by mouth every 4 hours as needed for mild pain      calcitonin, salmon, (MIACALCIN) 200 UNIT/ACT nasal spray Spray 1 spray into one nostril alternating nostrils daily Alternate nostril each day.      Diclofenac-Na Hyaluron-Niacin 3-2-4 % GEL Externally apply 1-4 g topically 4 times daily as needed      doxycycline hyclate (VIBRAMYCIN) 50 MG capsule Take 50 mg by mouth every evening      fentaNYL (DURAGESIC) 12 mcg/hr 72 hr patch Place 1 patch onto the skin every 72 hours remove old patch.      fentaNYL (DURAGESIC) 25 mcg/hr 72 hr patch Place 1 patch onto the skin every 72 hours remove old patch.      ferrous sulfate (FEROSUL) 325 (65 Fe) MG tablet Take 325 mg by mouth daily (with breakfast)      fish oil-omega-3 fatty acids 1000 MG capsule Take 2 g by mouth 2 times daily      fluconazole (DIFLUCAN) 200 MG tablet Take 200 mg by mouth daily as needed      fluticasone-salmeterol (ADVAIR) 100-50 MCG/DOSE inhaler Inhale 1 puff into the lungs every 12 hours      furosemide (LASIX) 20 MG tablet Take 20 mg by mouth every other day      hydrOXYzine (ATARAX) 25 MG tablet Take 25 mg by mouth every 4 hours as needed for anxiety or other (pain)      imiquimod (ALDARA) 5 % external cream Apply topically nightly as needed      lidocaine-prilocaine (EMLA) 2.5-2.5 % external cream Apply topically as needed for moderate pain      lurasidone (LATUDA) 20 MG TABS tablet Take 20 mg by mouth At Bedtime       NONFORMULARY Take 500 mg by mouth daily as needed Rajat leaf extract      polyethylene glycol (MIRALAX) 17 g packet Take 1 packet by mouth daily      polyvinyl alcohol (LIQUIFILM TEARS) 1.4 % ophthalmic solution Place 1 drop into both eyes 3 times daily as needed for dry eyes      pravastatin (PRAVACHOL) 80 MG tablet  Take 80 mg by mouth At Bedtime      senna-docusate (SENOKOT-S/PERICOLACE) 8.6-50 MG tablet Take 1-2 tablets by mouth 2 times daily      venlafaxine (EFFEXOR-XR) 150 MG 24 hr capsule Take 150 mg by mouth daily      albuterol (PROAIR HFA/PROVENTIL HFA/VENTOLIN HFA) 108 (90 Base) MCG/ACT inhaler Inhale 2 puffs into the lungs every 4 hours as needed for shortness of breath / dyspnea or wheezing (VENTOLIN)      buPROPion (WELLBUTRIN XL) 300 MG 24 hr tablet Take 300 mg by mouth every morning       cycloSPORINE (RESTASIS) 0.05 % ophthalmic emulsion Place 1 drop into both eyes 2 times daily      diltiazem (CARDIZEM) 60 MG tablet Take 60 mg by mouth 3 times daily      fluticasone (FLONASE) 50 MCG/ACT nasal spray Spray 2 sprays into both nostrils daily       gabapentin (NEURONTIN) 300 MG capsule Take 900 mg by mouth 3 times daily (300MG X 3 = 900MG)      leflunomide (ARAVA) 20 MG tablet Take 20 mg by mouth every evening       levothyroxine (SYNTHROID/LEVOTHROID) 75 MCG tablet Take 75 mcg by mouth daily      Lidocaine (LIDOCARE) 4 % Patch Place 2 patches onto the skin every 12 hours as needed for moderate pain      metFORMIN (GLUCOPHAGE-XR) 500 MG 24 hr tablet Take 1,000 mg by mouth daily (with breakfast) (500MG X 2 = 1,000MG)      methylPREDNISolone (MEDROL) 4 MG tablet Take 8 mg by mouth daily       modafinil (PROVIGIL) 200 MG tablet Take 200 mg by mouth daily as needed       morphine (MSIR) 15 MG IR tablet Take 15-30 mg by mouth 2 times daily as needed for severe pain       multivitamin, therapeutic (THERA-VIT) TABS tablet Take 1 tablet by mouth daily      naloxone (NARCAN) 4 MG/0.1ML nasal spray Spray 4 mg into one nostril alternating nostrils once as needed for opioid reversal every 2-3 minutes until assistance arrives      nystatin (MYCOSTATIN) 200727 UNIT/GM external cream Apply topically 2 times daily as needed for dry skin       nystatin (MYCOSTATIN) 270045 UNIT/GM external powder Apply topically 4 times daily as  needed Under abdominal wall       omeprazole (PRILOSEC) 20 MG DR capsule Take 20 mg by mouth 2 times daily       oxyCODONE (OXYCONTIN) 15 MG 12 hr tablet Take 15 mg by mouth 3 times daily      phentermine (ADIPEX-P) 37.5 MG tablet Take 18.75 mg by mouth daily (37.5MG X 0.5 = 18.75MG)      secukinumab (COSENTYX) 150 MG/ML Sensoready pen Inject 300 mg Subcutaneous every 28 days      topiramate (TOPAMAX) 50 MG tablet Take 50 mg by mouth 2 times daily      traZODone (DESYREL) 100 MG tablet Take 100 mg by mouth At Bedtime       varenicline (CHANTIX) 1 MG tablet Take 1 mg by mouth 2 times daily      vitamin D2 (ERGOCALCIFEROL) 71775 units (1250 mcg) capsule Take 50,000 Units by mouth four times a week       Xylitol 500 MG DISK Take 1 Dose by mouth every evening as needed                   Consultations:   Consultation during this admission received:   INTERNAL MEDICINE ADULT IP CONSULT FOR [facility]  PHARMACY TO DOSE GENTAMICIN  PHARMACY TO DOSE VANCO  PHARMACY TO DOSE GENTAMICIN  PHARMACY TO DOSE VANCO  PHARMACY IP CONSULT  VASCULAR ACCESS ADULT IP CONSULT  CARE MANAGEMENT / SOCIAL WORK IP CONSULT  CARE MANAGEMENT / SOCIAL WORK IP CONSULT          Brief History of Illness:     From Urology H&P - Dr. Sheron Hartman 4/13/21:  Swathi Gibbons is a 55 year old female with PMHx psoriatic arthritis (on chronic daily steroids), asthma, history of alcohol abuse, NABOR, GERD, hypothyroidism, depression, anxiety, peptic ulcer, T2DM (not on insulin), chronic pain (managed with 2 fentanyl patches) who presented to outside ED today with 2 days of hematuria and abdominal pain. CT revealed two left ureteral stones with hydronephrosis, UA + nitrites and leuk esterase. Patient was also febrile and therefore urgently transferred for urologic intervention.     History is somewhat limited due to patient distress. She has never had a stone before. She started feeling abdominal pain 2 days ago and presented to ED 4/12 after no  improvement. She denies symptomatic fever or chills, but was febrile to 101 at outside ED. She denies nausea or vomiting. She is having bloody urine but unable to describe further. She additionally complains of urinary frequency and urgency incontinence.     Per chart review, she recently had left shoulder surgery 3/16 and was treated as an outpatient for UTI 3/31 with keflex (and diflucan as yeast infection prophylaxis per NP's documentation.) Patient reports that this treatment helped. Of note, she reports anaphylactic allergy to moxifloxacin, hives with sulfa and cefaclor.           Hospital Course:   Given that she had an obstructing left ureteral stone in conjunction with infected UA and fever, she was taken the same day for urgent left stent placement with Dr. Haynes.  The procedure was uncomplicated, but please see the formal Operative Report for details.  Cefepime was begun to provide broad-spectrum antibiotic coverage, and Internal Medicine was consulted to assist with medical mgmt.      By the morning of POD#1, Hyun had contacted [facility] to report that patient's blood culture from 4/12/21 was positive, preliminarily growing GNRs.  Later that day the UCx from [facility] was finalized, growing K pneumoniae.  Ms. Gibbons was kept inpatient until POD#2, when blood culture information from [facility] also revealed K pneumoniae, resistant to nitrofurantoin and tetracycline but susceptible to ampicillin, cefazolin, cefepime, ceftriaxone, cipro, gent, levaquin, tobramycin, bactrim.      At this point, the patient was feeling well.  She was afebrile with normal labs, her ramsey had been removed and she was voiding without difficulty.  Infectious Disease was consulted to make recommendations for antibiotic mgmt.  After discussing Ms. Gibbons with ID, it was felt that curbside recommendations from their team would be satisfactory.  Because Ms. Gibbons had an anaphylactic response to previous moxifloxacin, quinolones  were unable to be used.  ID recommended:  - IV antibiotics for 10d from the first negative blood cultures (until 4/23/21).  Recommend changing from cefepime to ceftriaxone 2g q24 hours, so last dose will be administered on 4/22/21.    - After completion of IV antibiotics, continue therapeutic dosing of ORAL cefdinir or cefpodoxime to extend until 3 days after her stone procedure.       A PICC was thus placed and IV antibiotics were arranged.    On POD #2 she was ambulating without assitance, tolerating the discharge diet, had pain controlled with PO medications to go home with, and was requiring no IV medications or fluids. She was discharged to home on IV ceftriaxone.  She was given appropriate contact information, follow-up and instructions as seen below in the discharge paperwork.         Discharge Labs:     No results found for: PSA  Recent Labs   Lab 04/15/21  0703 04/14/21  0554   WBC 4.1 5.4   HGB 9.1* 9.8*   * 134*     Recent Labs   Lab 04/15/21  0703 04/14/21  2250 04/14/21  0554 04/14/21  0554     --   --  141   POTASSIUM 4.2 4.1   < > 3.3*   CHLORIDE 108  --   --  107   CO2 24  --   --  26   BUN 20  --   --  19   CR 0.89  --   --  0.83   *  --   --  108*   EVELIA 8.4*  --   --  8.2*    < > = values in this interval not displayed.     No lab results found in last 7 days.    Invalid input(s): URINEBLOOD  No results found for this or any previous visit.         Discharge Instructions and Follow-Up:   Diet:  - Regular  - Drink 2-3L of water per day (to keep urine light pink / yellow)    Discharge Activity:  - Some hematuria (blood in the urine) is normal when you have a stent.  Return to normal activity only as limited by hematuria that gets worse with increased activity.    - Do not drive until you can press the brake pedal quickly and fully without pain.   - Do not operate a motor vehicle while taking narcotic pain medications.     Medications:  - PAIN:  - Continue your home pain  medications per your agreement with your pain management clinic. Never drive, operate machinery or drink alcoholic beverages while you are taking new or escalated doses of narcotic pain medications or when you are impaired.    - Tylenol (acetaminophen 625mg) may be added for breakthrough pain.  Do not take more than 4,000mg of Tylenol (acetaminophen/ APAP) from all sources in any 24 hour period since this can cause liver damage.      - Continue your home bowel regimen to avoid constipation.   - Stents can sometimes cause bladder spasms (lower abdominal pain that feels like the need to urinate). We have prescribed oxybutynin 5mg to take three times daily as needed if you have bladder spasms.  Be aware that oxybutynin can cause constipation and dry mouth.    - ANTIBIOTICS:  You will discharge with a PICC iv line so that you can receive IV ceftriaxone 2g daily on 4/16, 4/17, 4/18. 4/19, 4/20, 4/21 and 4/22/21.  After the last dose of IV antibiotics on 4/22/21 your PICC line can be removed by a nurse.  Starting on 4/23/21, begin cefdinir 300mg twice daily oral tablets.  Continue these oral antibiotics until THREE DAYS AFTER YOUR STONE REMOVAL PROCEDURE.  Call the Urology clinic if you need another prescription for antibiotics.      Stent Instructions:   - You have a left ureteral stent in place. This stent is not intended to be permanent and must be removed or replaced, typically within 3-6 months.  You can expect some flank discomfort on that side, possibly worse with urination or worse first thing in the morning when your bladder is full.  You may also experience the urge to void more frequently or burning in your urethra with urination.  This is normal when you have a stent.    - Drink 2-3L of water a day to keep your urine clear to light pink in color. Some blood in your urine can be expected but should clear with increased water consumption as instructed.   - Call for thickening red urine (like tomato juice), large  "clots or inability to void.     Follow-Up:   - Schedule an appointment to be seen by your primary care provider within 7-10 days of discharge for a postoperative checkup.   - Follow up with your Pain Mgmt clinic per previous  - Follow up with your Rheumatology clinic per previous  - Follow up with Dr. Haynes in the OR for stone removal.  The Urology Clinic should be calling you within 3 business days to give you an OR appointment.  Remember to continue antibiotics as directed.    - Call or return sooner than your regularly scheduled visit if you develop any of the following:  fever, uncontrolled pain, or uncontrolled nausea or vomiting.  It is normal to see blood in your urine, but seek medical attention if you are passing large clots, are unable to void, or if your urine will not lighten in color with increased fluid intake.      Here are some phone numbers where you can reach us:  - Monday through Friday, 8am to 4:30pm - as long as it is not a holiday, IT IS BEST to call the Urology Clinic Triage Line at: [phone] with any non-emergent urology concerns.    - If it is a night, weekend, or holiday call the [facility] number at [phone] and ask the  to page the \"urology resident on call\".  Typically, the on-call provider should return your call within 30 minutes.  Please page the on-call provider again if you haven't been contacted as expected.  Rarely, the on-call provider will be unable to promptly return a call due to a hospital emergency.  If you have paged twice and are still not contacted, ask the hospital  to page the \"urology CHIEF-RESIDENT on call\".  - FOR EMERGENCIES, always call 911 or go to the Emergency Department         Discharge Disposition:     Discharged to home      Attestation: I have reviewed today's vital signs, notes, medications, labs and imaging.    Danisha Pearson PA-C  Urology Physician Assistant  Personal Pager: [phone]    Please call Job Code:   [phone] to " reach the Urology resident or PA on call - Weekdays  [phone] to reach the Urology resident or PA on call - Weeknights and weekends    April 15, 2021

## 2021-04-16 VITALS
TEMPERATURE: 98.2 F | OXYGEN SATURATION: 99 % | HEIGHT: 63 IN | HEART RATE: 94 BPM | WEIGHT: 246 LBS | RESPIRATION RATE: 16 BRPM | BODY MASS INDEX: 43.59 KG/M2 | DIASTOLIC BLOOD PRESSURE: 83 MMHG | SYSTOLIC BLOOD PRESSURE: 140 MMHG

## 2021-04-16 NOTE — PROGRESS NOTES
[facility]: Post-Discharge Note  SITUATION                                                      Admission:    Admission Date: 04/13/21   Reason for Admission: Left ureteral stone  Discharge:   Discharge Date: 04/15/21  Discharge Diagnosis: Left ureteral stone  Discharge Service: Urology    BACKGROUND                                                      Swathi Gibbons is a 55 year old female with PMHx psoriatic arthritis (on chronic daily steroids), asthma, history of alcohol abuse, NABOR, GERD, hypothyroidism, depression, anxiety, peptic ulcer, T2DM (not on insulin), chronic pain (managed with 2 fentanyl patches) who presented to outside ED today with 2 days of hematuria and abdominal pain. CT revealed two left ureteral stones with hydronephrosis, UA + nitrites and leuk esterase. Patient was also febrile and therefore urgently transferred for urologic intervention.     History is somewhat limited due to patient distress. She has never had a stone before. She started feeling abdominal pain 2 days ago and presented to ED 4/12 after no improvement. She denies symptomatic fever or chills, but was febrile to 101 at outside ED. She denies nausea or vomiting. She is having bloody urine but unable to describe further. She additionally complains of urinary frequency and urgency incontinence.     Per chart review, she recently had left shoulder surgery 3/16 and was treated as an outpatient for UTI 3/31 with keflex (and diflucan as yeast infection prophylaxis per NP's documentation.) Patient reports that this treatment helped. Of note, she reports anaphylactic allergy to moxifloxacin, hives with sulfa and cefaclor.    ASSESSMENT      Discharge Assessment  Patient reports symptoms are: Unchanged(No new or worsening symptoms)  Does the patient have all of their medications?: Yes  Does patient know what their new medications are for?: Yes  Does patient have a follow-up appointment scheduled?: No  Does patient have any  "other questions or concerns?: No    Post-op  Did the patient have surgery or a procedure: Yes(CYSTOSCOPY, WITH URETERAL STENT INSERTION)  Fever: No  Chills: No  Eating & Drinking: eating and drinking without complaints/concerns  Bowel Function: normal  Urinary Status: voiding without complaint/concerns(Patient reports she is \"going to the bathroom a lot\")    PLAN                                                      Outpatient Plan:      - Schedule an appointment to be seen by your primary care provider within 7-10 days of discharge for a postoperative checkup.   - Follow up with your Pain Mgmt clinic per previous  - Follow up with your Rheumatology clinic per previous  - Follow up with Dr. Haynes in the OR for stone removal.  The Urology Clinic should be calling you within 3 business days to give you an OR appointment.  Remember to continue antibiotics as directed.      No future appointments.        Jennifer Moore, [facility]                  "

## 2021-04-18 RX ORDER — SOLIFENACIN SUCCINATE 5 MG/1
TABLET, FILM COATED ORAL
Qty: 90 TABLET | OUTPATIENT
Start: 2021-04-18

## 2021-04-19 DIAGNOSIS — N20.0 NEPHROLITHIASIS: Primary | ICD-10-CM

## 2021-04-19 LAB
BACTERIA SPEC CULT: NO GROWTH
Lab: NORMAL
Lab: NORMAL
SPECIMEN SOURCE: NORMAL

## 2021-04-19 RX ORDER — CLINDAMYCIN PHOSPHATE 900 MG/50ML
900 INJECTION, SOLUTION INTRAVENOUS SEE ADMIN INSTRUCTIONS
Status: CANCELLED | OUTPATIENT
Start: 2021-04-19

## 2021-04-19 RX ORDER — CLINDAMYCIN PHOSPHATE 900 MG/50ML
900 INJECTION, SOLUTION INTRAVENOUS
Status: CANCELLED | OUTPATIENT
Start: 2021-04-19

## 2021-04-26 ENCOUNTER — DOCUMENTATION ONLY (OUTPATIENT)
Dept: UROLOGY | Facility: CLINIC | Age: 56
End: 2021-04-26

## 2021-04-26 ENCOUNTER — TELEPHONE (OUTPATIENT)
Dept: UROLOGY | Facility: CLINIC | Age: 56
End: 2021-04-26

## 2021-04-26 DIAGNOSIS — Z11.59 ENCOUNTER FOR SCREENING FOR OTHER VIRAL DISEASES: ICD-10-CM

## 2021-04-26 NOTE — PROGRESS NOTES
Patient has lab only appt 4/30/21 for Covid PCR test, no orders in chart.  Please place FUTURE orders in Epic if appropriate.    Thanks,   [facility] lab

## 2021-04-26 NOTE — CONFIDENTIAL NOTE
Patient is scheduled for surgery with Dr. Haynes    Spoke with: Swathi    Date of Surgery: 5/4/21    Location: [facility] OR    Informed patient they will need an adult  yes    Pre-op with surgeon (if applicable): n/a    H&P: Scheduled with PCP    Pre-procedure COVID-19 Test: 4/30/21    Additional imaging/appointments: n/a    Surgery packet: to be mailed by 4/29/21     Additional comments: n/a

## 2021-04-27 ENCOUNTER — PATIENT OUTREACH (OUTPATIENT)
Dept: UROLOGY | Facility: CLINIC | Age: 56
End: 2021-04-27

## 2021-04-27 DIAGNOSIS — N20.0 NEPHROLITHIASIS: Primary | ICD-10-CM

## 2021-04-27 NOTE — TELEPHONE ENCOUNTER
Left message for the patient to call me to discuss her upcoming surgery with Dr. Haynes.  Patient has H & P in the system currently from 4/13/21. She still needs urine culture done before her procedure and Covid test. She needs to bring her c-pap machine to surgery.

## 2021-04-29 DIAGNOSIS — Z11.59 ENCOUNTER FOR SCREENING FOR OTHER VIRAL DISEASES: Primary | ICD-10-CM

## 2021-04-30 ENCOUNTER — TELEPHONE (OUTPATIENT)
Dept: UROLOGY | Facility: CLINIC | Age: 56
End: 2021-04-30

## 2021-04-30 ENCOUNTER — ANCILLARY PROCEDURE (OUTPATIENT)
Dept: GENERAL RADIOLOGY | Facility: CLINIC | Age: 56
End: 2021-04-30
Payer: MEDICARE

## 2021-04-30 ENCOUNTER — PRE VISIT (OUTPATIENT)
Dept: UROLOGY | Facility: CLINIC | Age: 56
End: 2021-04-30

## 2021-04-30 DIAGNOSIS — N20.0 NEPHROLITHIASIS: ICD-10-CM

## 2021-04-30 DIAGNOSIS — Z11.59 ENCOUNTER FOR SCREENING FOR OTHER VIRAL DISEASES: ICD-10-CM

## 2021-04-30 DIAGNOSIS — N20.0 CALCIUM NEPHROLITHIASIS: ICD-10-CM

## 2021-04-30 LAB
LABORATORY COMMENT REPORT: NORMAL
SARS-COV-2 RNA RESP QL NAA+PROBE: NEGATIVE
SARS-COV-2 RNA RESP QL NAA+PROBE: NORMAL
SPECIMEN SOURCE: NORMAL
SPECIMEN SOURCE: NORMAL

## 2021-04-30 PROCEDURE — 87186 SC STD MICRODIL/AGAR DIL: CPT | Performed by: UROLOGY

## 2021-04-30 PROCEDURE — 87088 URINE BACTERIA CULTURE: CPT | Performed by: UROLOGY

## 2021-04-30 PROCEDURE — 74019 RADEX ABDOMEN 2 VIEWS: CPT | Performed by: RADIOLOGY

## 2021-04-30 PROCEDURE — U0005 INFEC AGEN DETEC AMPLI PROBE: HCPCS | Performed by: UROLOGY

## 2021-04-30 PROCEDURE — U0003 INFECTIOUS AGENT DETECTION BY NUCLEIC ACID (DNA OR RNA); SEVERE ACUTE RESPIRATORY SYNDROME CORONAVIRUS 2 (SARS-COV-2) (CORONAVIRUS DISEASE [COVID-19]), AMPLIFIED PROBE TECHNIQUE, MAKING USE OF HIGH THROUGHPUT TECHNOLOGIES AS DESCRIBED BY CMS-2020-01-R: HCPCS | Performed by: UROLOGY

## 2021-04-30 PROCEDURE — 87086 URINE CULTURE/COLONY COUNT: CPT | Performed by: UROLOGY

## 2021-04-30 NOTE — TELEPHONE ENCOUNTER
Reason for visit: Post op follow up cystoscopy    Relevant information: cystoureteroscopy with lithotripsy    Records/imaging/labs/orders: in EPIC    Pt called: no    At Rooming: normal

## 2021-04-30 NOTE — TELEPHONE ENCOUNTER
----- Message from Luis Antonio Haynes MD sent at 4/29/2021 10:57 PM CDT -----  Please let her know vesicare 5mg bid is OK.Thank You  ----- Message -----  From: Yessenia Morris RN  Sent: 4/29/2021   2:30 PM CDT  To: Luis Antonio Haynes MD    Srikanth  Patient calling to ask if she can take her Vesicare 5 mg twice a day since her bladder spasms have increased with the stent.   Would you let Linda know since I am out tomorrow?  thanks

## 2021-04-30 NOTE — TELEPHONE ENCOUNTER
LVM for patient to go to any [facility] lab to leave a UC.  Linda Valerio LPN  Urology Clinic Interim Supervisor  [facility] Urology Clinic

## 2021-04-30 NOTE — TELEPHONE ENCOUNTER
Contacted patient to discuss. She will also go leave a urine culture today.  Linda Valerio LPN  Urology Clinic Interim Supervisor  [facility] Urology Clinic

## 2021-05-03 ENCOUNTER — ANESTHESIA EVENT (OUTPATIENT)
Dept: SURGERY | Facility: CLINIC | Age: 56
End: 2021-05-03
Payer: MEDICARE

## 2021-05-03 LAB
BACTERIA SPEC CULT: ABNORMAL
Lab: ABNORMAL
SPECIMEN SOURCE: ABNORMAL

## 2021-05-04 ENCOUNTER — ANESTHESIA (OUTPATIENT)
Dept: SURGERY | Facility: CLINIC | Age: 56
End: 2021-05-04
Payer: MEDICARE

## 2021-05-04 ENCOUNTER — HOSPITAL ENCOUNTER (OUTPATIENT)
Facility: CLINIC | Age: 56
Discharge: HOME | End: 2021-05-04
Attending: UROLOGY | Admitting: UROLOGY
Payer: MEDICARE

## 2021-05-04 ENCOUNTER — APPOINTMENT (OUTPATIENT)
Dept: GENERAL RADIOLOGY | Facility: CLINIC | Age: 56
End: 2021-05-04
Attending: UROLOGY
Payer: MEDICARE

## 2021-05-04 VITALS
WEIGHT: 250 LBS | OXYGEN SATURATION: 94 % | SYSTOLIC BLOOD PRESSURE: 100 MMHG | HEART RATE: 70 BPM | RESPIRATION RATE: 20 BRPM | BODY MASS INDEX: 44.3 KG/M2 | TEMPERATURE: 98.2 F | HEIGHT: 63 IN | DIASTOLIC BLOOD PRESSURE: 65 MMHG

## 2021-05-04 DIAGNOSIS — N20.0 NEPHROLITHIASIS: ICD-10-CM

## 2021-05-04 DIAGNOSIS — N20.0 NEPHROLITHIASIS: Primary | ICD-10-CM

## 2021-05-04 LAB
ALBUMIN UR-MCNC: NEGATIVE MG/DL
APPEARANCE UR: CLEAR
BACTERIA #/AREA URNS HPF: ABNORMAL /HPF
BILIRUB UR QL STRIP: NEGATIVE
COLOR UR AUTO: YELLOW
CREAT SERPL-MCNC: 0.62 MG/DL (ref 0.52–1.04)
GFR SERPL CREATININE-BSD FRML MDRD: >90 ML/MIN/{1.73_M2}
GLUCOSE BLDC GLUCOMTR-MCNC: 100 MG/DL (ref 70–99)
GLUCOSE BLDC GLUCOMTR-MCNC: 163 MG/DL (ref 70–99)
GLUCOSE UR STRIP-MCNC: NEGATIVE MG/DL
HGB BLD-MCNC: 10.6 G/DL (ref 11.7–15.7)
HGB UR QL STRIP: ABNORMAL
KETONES UR STRIP-MCNC: NEGATIVE MG/DL
LEUKOCYTE ESTERASE UR QL STRIP: ABNORMAL
MUCOUS THREADS #/AREA URNS LPF: PRESENT /LPF
NITRATE UR QL: NEGATIVE
PH UR STRIP: 7.5 PH (ref 5–7)
PLATELET # BLD AUTO: 235 10E9/L (ref 150–450)
POTASSIUM SERPL-SCNC: 3.7 MMOL/L (ref 3.4–5.3)
RBC #/AREA URNS AUTO: 52 /HPF (ref 0–2)
SOURCE: ABNORMAL
SP GR UR STRIP: 1.01 (ref 1–1.03)
SQUAMOUS #/AREA URNS AUTO: 2 /HPF (ref 0–1)
TRANS CELLS #/AREA URNS HPF: <1 /HPF (ref 0–1)
UROBILINOGEN UR STRIP-MCNC: NORMAL MG/DL (ref 0–2)
WBC #/AREA URNS AUTO: 8 /HPF (ref 0–5)

## 2021-05-04 PROCEDURE — C2617 STENT, NON-COR, TEM W/O DEL: HCPCS | Performed by: UROLOGY

## 2021-05-04 PROCEDURE — 36415 COLL VENOUS BLD VENIPUNCTURE: CPT | Performed by: ANESTHESIOLOGY

## 2021-05-04 PROCEDURE — 999N000180 XR SURGERY CARM FLUORO LESS THAN 5 MIN: Mod: TC

## 2021-05-04 PROCEDURE — 710N000012 HC RECOVERY PHASE 2, PER MINUTE: Performed by: UROLOGY

## 2021-05-04 PROCEDURE — 710N000011 HC RECOVERY PHASE 1, LEVEL 3, PER MIN: Performed by: UROLOGY

## 2021-05-04 PROCEDURE — 82962 GLUCOSE BLOOD TEST: CPT

## 2021-05-04 PROCEDURE — 250N000011 HC RX IP 250 OP 636: Performed by: UROLOGY

## 2021-05-04 PROCEDURE — 250N000011 HC RX IP 250 OP 636: Performed by: ANESTHESIOLOGY

## 2021-05-04 PROCEDURE — 360N000083 HC SURGERY LEVEL 3 W/ FLUORO, PER MIN: Performed by: UROLOGY

## 2021-05-04 PROCEDURE — C1894 INTRO/SHEATH, NON-LASER: HCPCS | Performed by: UROLOGY

## 2021-05-04 PROCEDURE — 250N000009 HC RX 250: Performed by: NURSE ANESTHETIST, CERTIFIED REGISTERED

## 2021-05-04 PROCEDURE — 82365 CALCULUS SPECTROSCOPY: CPT | Performed by: UROLOGY

## 2021-05-04 PROCEDURE — 250N000013 HC RX MED GY IP 250 OP 250 PS 637: Performed by: ANESTHESIOLOGY

## 2021-05-04 PROCEDURE — 272N000001 HC OR GENERAL SUPPLY STERILE: Performed by: UROLOGY

## 2021-05-04 PROCEDURE — 250N000011 HC RX IP 250 OP 636: Performed by: NURSE ANESTHETIST, CERTIFIED REGISTERED

## 2021-05-04 PROCEDURE — 370N000017 HC ANESTHESIA TECHNICAL FEE, PER MIN: Performed by: UROLOGY

## 2021-05-04 PROCEDURE — 999N000141 HC STATISTIC PRE-PROCEDURE NURSING ASSESSMENT: Performed by: UROLOGY

## 2021-05-04 PROCEDURE — 258N000003 HC RX IP 258 OP 636: Performed by: NURSE ANESTHETIST, CERTIFIED REGISTERED

## 2021-05-04 PROCEDURE — 82565 ASSAY OF CREATININE: CPT | Performed by: ANESTHESIOLOGY

## 2021-05-04 PROCEDURE — 258N000003 HC RX IP 258 OP 636: Performed by: ANESTHESIOLOGY

## 2021-05-04 PROCEDURE — 250N000009 HC RX 250: Performed by: UROLOGY

## 2021-05-04 PROCEDURE — 81001 URINALYSIS AUTO W/SCOPE: CPT | Performed by: UROLOGY

## 2021-05-04 PROCEDURE — 250N000025 HC SEVOFLURANE, PER MIN: Performed by: UROLOGY

## 2021-05-04 PROCEDURE — 255N000002 HC RX 255 OP 636: Performed by: UROLOGY

## 2021-05-04 PROCEDURE — 85018 HEMOGLOBIN: CPT | Performed by: ANESTHESIOLOGY

## 2021-05-04 PROCEDURE — 258N000003 HC RX IP 258 OP 636: Performed by: UROLOGY

## 2021-05-04 PROCEDURE — 84132 ASSAY OF SERUM POTASSIUM: CPT | Performed by: ANESTHESIOLOGY

## 2021-05-04 PROCEDURE — 85049 AUTOMATED PLATELET COUNT: CPT | Performed by: ANESTHESIOLOGY

## 2021-05-04 PROCEDURE — C1769 GUIDE WIRE: HCPCS | Performed by: UROLOGY

## 2021-05-04 DEVICE — URETERAL STENT
Type: IMPLANTABLE DEVICE | Site: URETER | Status: FUNCTIONAL
Brand: PERCUFLEX™ PLUS

## 2021-05-04 RX ORDER — FENTANYL CITRATE 50 UG/ML
25-50 INJECTION, SOLUTION INTRAMUSCULAR; INTRAVENOUS
Status: DISCONTINUED | OUTPATIENT
Start: 2021-05-04 | End: 2021-05-04 | Stop reason: HOSPADM

## 2021-05-04 RX ORDER — LIDOCAINE 40 MG/G
CREAM TOPICAL
Status: DISCONTINUED | OUTPATIENT
Start: 2021-05-04 | End: 2021-05-04 | Stop reason: HOSPADM

## 2021-05-04 RX ORDER — HYDROMORPHONE HYDROCHLORIDE 1 MG/ML
.3-.5 INJECTION, SOLUTION INTRAMUSCULAR; INTRAVENOUS; SUBCUTANEOUS EVERY 10 MIN PRN
Status: DISCONTINUED | OUTPATIENT
Start: 2021-05-04 | End: 2021-05-04 | Stop reason: HOSPADM

## 2021-05-04 RX ORDER — LIDOCAINE HYDROCHLORIDE 20 MG/ML
INJECTION, SOLUTION INFILTRATION; PERINEURAL PRN
Status: DISCONTINUED | OUTPATIENT
Start: 2021-05-04 | End: 2021-05-04

## 2021-05-04 RX ORDER — NALOXONE HYDROCHLORIDE 0.4 MG/ML
0.4 INJECTION, SOLUTION INTRAMUSCULAR; INTRAVENOUS; SUBCUTANEOUS
Status: DISCONTINUED | OUTPATIENT
Start: 2021-05-04 | End: 2021-05-04 | Stop reason: HOSPADM

## 2021-05-04 RX ORDER — SODIUM CHLORIDE, SODIUM LACTATE, POTASSIUM CHLORIDE, CALCIUM CHLORIDE 600; 310; 30; 20 MG/100ML; MG/100ML; MG/100ML; MG/100ML
INJECTION, SOLUTION INTRAVENOUS CONTINUOUS
Status: DISCONTINUED | OUTPATIENT
Start: 2021-05-04 | End: 2021-05-04 | Stop reason: HOSPADM

## 2021-05-04 RX ORDER — CLINDAMYCIN PHOSPHATE 900 MG/50ML
900 INJECTION, SOLUTION INTRAVENOUS SEE ADMIN INSTRUCTIONS
Status: DISCONTINUED | OUTPATIENT
Start: 2021-05-04 | End: 2021-05-04 | Stop reason: HOSPADM

## 2021-05-04 RX ORDER — PROPOFOL 10 MG/ML
INJECTION, EMULSION INTRAVENOUS CONTINUOUS PRN
Status: DISCONTINUED | OUTPATIENT
Start: 2021-05-04 | End: 2021-05-04

## 2021-05-04 RX ORDER — CEFDINIR 300 MG/1
300 CAPSULE ORAL 2 TIMES DAILY
Qty: 14 CAPSULE | Refills: 0 | Status: SHIPPED | OUTPATIENT
Start: 2021-05-04 | End: 2021-06-25

## 2021-05-04 RX ORDER — ONDANSETRON 4 MG/1
4 TABLET, ORALLY DISINTEGRATING ORAL EVERY 30 MIN PRN
Status: DISCONTINUED | OUTPATIENT
Start: 2021-05-04 | End: 2021-05-04 | Stop reason: HOSPADM

## 2021-05-04 RX ORDER — MEPERIDINE HYDROCHLORIDE 25 MG/ML
12.5 INJECTION INTRAMUSCULAR; INTRAVENOUS; SUBCUTANEOUS
Status: DISCONTINUED | OUTPATIENT
Start: 2021-05-04 | End: 2021-05-04 | Stop reason: HOSPADM

## 2021-05-04 RX ORDER — ONDANSETRON 2 MG/ML
INJECTION INTRAMUSCULAR; INTRAVENOUS PRN
Status: DISCONTINUED | OUTPATIENT
Start: 2021-05-04 | End: 2021-05-04

## 2021-05-04 RX ORDER — NALOXONE HYDROCHLORIDE 0.4 MG/ML
0.2 INJECTION, SOLUTION INTRAMUSCULAR; INTRAVENOUS; SUBCUTANEOUS
Status: DISCONTINUED | OUTPATIENT
Start: 2021-05-04 | End: 2021-05-04 | Stop reason: HOSPADM

## 2021-05-04 RX ORDER — PROPOFOL 10 MG/ML
INJECTION, EMULSION INTRAVENOUS PRN
Status: DISCONTINUED | OUTPATIENT
Start: 2021-05-04 | End: 2021-05-04

## 2021-05-04 RX ORDER — OXYCODONE HYDROCHLORIDE 5 MG/1
5 TABLET ORAL ONCE
Status: COMPLETED | OUTPATIENT
Start: 2021-05-04 | End: 2021-05-04

## 2021-05-04 RX ORDER — OXYCODONE HYDROCHLORIDE 5 MG/1
5 TABLET ORAL EVERY 6 HOURS PRN
Qty: 6 TABLET | Refills: 0 | Status: SHIPPED | OUTPATIENT
Start: 2021-05-04 | End: 2021-05-07

## 2021-05-04 RX ORDER — ONDANSETRON 2 MG/ML
4 INJECTION INTRAMUSCULAR; INTRAVENOUS EVERY 30 MIN PRN
Status: DISCONTINUED | OUTPATIENT
Start: 2021-05-04 | End: 2021-05-04 | Stop reason: HOSPADM

## 2021-05-04 RX ORDER — CLINDAMYCIN PHOSPHATE 900 MG/50ML
900 INJECTION, SOLUTION INTRAVENOUS
Status: DISCONTINUED | OUTPATIENT
Start: 2021-05-04 | End: 2021-05-04 | Stop reason: HOSPADM

## 2021-05-04 RX ORDER — SOLIFENACIN SUCCINATE 5 MG/1
5 TABLET, FILM COATED ORAL DAILY PRN
Qty: 30 TABLET | Refills: 3 | Status: SHIPPED | OUTPATIENT
Start: 2021-05-04 | End: 2021-05-08

## 2021-05-04 RX ADMIN — PHENYLEPHRINE HYDROCHLORIDE 100 MCG: 10 INJECTION INTRAVENOUS at 12:34

## 2021-05-04 RX ADMIN — HYDROMORPHONE HYDROCHLORIDE 0.3 MG: 1 INJECTION, SOLUTION INTRAMUSCULAR; INTRAVENOUS; SUBCUTANEOUS at 15:00

## 2021-05-04 RX ADMIN — PHENYLEPHRINE HYDROCHLORIDE 100 MCG: 10 INJECTION INTRAVENOUS at 12:25

## 2021-05-04 RX ADMIN — PHENYLEPHRINE HYDROCHLORIDE 100 MCG: 10 INJECTION INTRAVENOUS at 12:43

## 2021-05-04 RX ADMIN — PHENYLEPHRINE HYDROCHLORIDE 100 MCG: 10 INJECTION INTRAVENOUS at 12:38

## 2021-05-04 RX ADMIN — PHENYLEPHRINE HYDROCHLORIDE 0.5 MCG/KG/MIN: 10 INJECTION INTRAVENOUS at 12:54

## 2021-05-04 RX ADMIN — PROPOFOL 200 MG: 10 INJECTION, EMULSION INTRAVENOUS at 12:07

## 2021-05-04 RX ADMIN — GENTAMICIN SULFATE 160 MG: 40 INJECTION, SOLUTION INTRAMUSCULAR; INTRAVENOUS at 12:16

## 2021-05-04 RX ADMIN — PROPOFOL 30 MCG/KG/MIN: 10 INJECTION, EMULSION INTRAVENOUS at 12:12

## 2021-05-04 RX ADMIN — OXYCODONE HYDROCHLORIDE 5 MG: 5 TABLET ORAL at 15:00

## 2021-05-04 RX ADMIN — SODIUM CHLORIDE, POTASSIUM CHLORIDE, SODIUM LACTATE AND CALCIUM CHLORIDE: 600; 310; 30; 20 INJECTION, SOLUTION INTRAVENOUS at 12:07

## 2021-05-04 RX ADMIN — ONDANSETRON 4 MG: 2 INJECTION INTRAMUSCULAR; INTRAVENOUS at 13:39

## 2021-05-04 RX ADMIN — CLINDAMYCIN PHOSPHATE 900 MG: 900 INJECTION, SOLUTION INTRAVENOUS at 12:16

## 2021-05-04 RX ADMIN — PROPOFOL 50 MG: 10 INJECTION, EMULSION INTRAVENOUS at 12:09

## 2021-05-04 RX ADMIN — LIDOCAINE HYDROCHLORIDE 100 MG: 20 INJECTION, SOLUTION INFILTRATION; PERINEURAL at 12:07

## 2021-05-04 RX ADMIN — PROPOFOL 50 MG: 10 INJECTION, EMULSION INTRAVENOUS at 12:10

## 2021-05-04 RX ADMIN — MIDAZOLAM 2 MG: 1 INJECTION INTRAMUSCULAR; INTRAVENOUS at 11:56

## 2021-05-04 RX ADMIN — SODIUM CHLORIDE, POTASSIUM CHLORIDE, SODIUM LACTATE AND CALCIUM CHLORIDE: 600; 310; 30; 20 INJECTION, SOLUTION INTRAVENOUS at 13:37

## 2021-05-04 ASSESSMENT — LIFESTYLE VARIABLES: TOBACCO_USE: 1

## 2021-05-04 ASSESSMENT — MIFFLIN-ST. JEOR: SCORE: 1698.13

## 2021-05-04 ASSESSMENT — COPD QUESTIONNAIRES: COPD: 1

## 2021-05-04 NOTE — ANESTHESIA PREPROCEDURE EVALUATION
Anesthesia Pre-Procedure Evaluation    Patient: Swathi Gibbons   MRN: [id number] : 1965        Preoperative Diagnosis: Left ureteral stone [N20.1]   Procedure : Procedure(s):  CYSTOSCOPY, WITH RETROGRADE PYELOGRAM AND URETERAL STENT INSERTION     Past Medical History:   Diagnosis Date     Alcohol use disorder, severe, in sustained remission (H)      Anxiety and depression      Arthritis     PSORIATIC     Chronic rhinitis      Chronic rhinitis      COPD (chronic obstructive pulmonary disease) (H)      Diabetes (H)     Type II     Esophageal dysmotility      EtOH dependence (H)      Gastroesophageal reflux disease      Hypertension      Hypothyroidism      Latent tuberculosis      MRSA (methicillin resistant staph aureus) culture positive      Nodular goiter      NABOR (obstructive sleep apnea)     C-PAP     Other chronic pain     back pain     Psoriasis      Secondary hyperparathyroidism (H)      Tracheostomy in place (H)      Uncomplicated asthma      Vitamin D deficiency       Past Surgical History:   Procedure Laterality Date     BACK SURGERY      L4-5 Decompression and Fusion     BREAST SURGERY      Right breast biopsy, benign     COMBINED CYSTOSCOPY, INSERT STENT URETER(S) Left 2021    Procedure: CYSTOSCOPY, WITH URETERAL STENT INSERTION;  Surgeon: Luis Antonio Haynes MD;  Location:  OR     ENT SURGERY      Tonsillectomy     EYE SURGERY      Lasik     GI SURGERY      Bladder Suspension     GYN SURGERY       Section     GYN SURGERY      Tubal Ligation     INSERT STIMULATOR DORSAL COLUMN N/A 2019    Procedure: INSERTION, SPINAL CORD STIMULATOR, DORSAL COLUMN (MEDTRONIC);  Surgeon: Reginald Pandey MD;  Location:  OR     INSERT STIMULATOR DORSAL COLUMN TRIAL N/A 2019    Procedure: SPINAL CORD STIMULATOR  TRIAL;  Surgeon: Reginald Pandey MD;  Location:  OR     ORTHOPEDIC SURGERY      ORIF Ankle Left 2017     ORTHOPEDIC SURGERY      Bunionectomy [facility].      Allergies   Allergen  Reactions     Avelox [Moxifloxacin] Anaphylaxis     Requiring epinephrine     Cefaclor Hives     Tolerated cephalexin, tolerated cefepime     Sulfa Drugs Hives      Social History     Tobacco Use     Smoking status: Former Smoker     Packs/day: 0.50     Types: Cigarettes     Quit date: 2/5/2018     Years since quitting: 3.2     Smokeless tobacco: Never Used   Substance Use Topics     Alcohol use: Not Currently     Frequency: Never     Comment: sober for 6 years      Wt Readings from Last 1 Encounters:   05/04/21 113.4 kg (250 lb)        Anesthesia Evaluation   Pt has had prior anesthetic. Type: General.    No history of anesthetic complications       ROS/MED HX  ENT/Pulmonary:     (+) sleep apnea, tobacco use, Past use, asthma COPD,     Neurologic:       Cardiovascular:     (+) hypertension-----    METS/Exercise Tolerance:     Hematologic:       Musculoskeletal:       GI/Hepatic:     (+) GERD,     Renal/Genitourinary:       Endo:     (+) type II DM, Obesity,     Psychiatric/Substance Use:     (+) psychiatric history anxiety and depression alcohol abuse     Infectious Disease:       Malignancy:       Other:            Physical Exam    Airway        Mallampati: III   TM distance: > 3 FB   Neck ROM: full   Mouth opening: > 3 cm    Respiratory Devices and Support         Dental         B=Bridge, C=Chipped, L=Loose, M=Missing    Cardiovascular   cardiovascular exam normal          Pulmonary   pulmonary exam normal                OUTSIDE LABS:  CBC:   Lab Results   Component Value Date    WBC 4.1 04/15/2021    WBC 5.4 04/14/2021    HGB 9.1 (L) 04/15/2021    HGB 9.8 (L) 04/14/2021    HCT 29.4 (L) 04/15/2021    HCT 32.6 (L) 04/14/2021     (L) 04/15/2021     (L) 04/14/2021     BMP:   Lab Results   Component Value Date     04/15/2021     04/14/2021    POTASSIUM 4.2 04/15/2021    POTASSIUM 4.1 04/14/2021    CHLORIDE 108 04/15/2021    CHLORIDE 107 04/14/2021    CO2 24 04/15/2021    CO2 26 04/14/2021     BUN 20 04/15/2021    BUN 19 04/14/2021    CR 0.89 04/15/2021    CR 0.83 04/14/2021     (H) 04/15/2021     (H) 04/14/2021     COAGS: No results found for: PTT, INR, FIBR  POC:   Lab Results   Component Value Date     (H) 05/04/2021     HEPATIC:   Lab Results   Component Value Date    ALBUMIN 4.4 09/14/2007    PROTTOTAL 7.8 09/14/2007    ALT 11 09/14/2007    AST 26 09/14/2007    GGT 30 09/14/2007    ALKPHOS 121 09/14/2007    BILITOTAL 0.4 09/14/2007     OTHER:   Lab Results   Component Value Date    LACT 1.7 04/13/2021    A1C 5.6 04/13/2021    EVELIA 8.4 (L) 04/15/2021    PHOS 3.5 09/14/2007    MAG 2.1 09/14/2007    LIPASE 50 09/15/2007    TSH 2.73 09/14/2007       Anesthesia Plan    ASA Status:  3   NPO Status:  NPO Appropriate                  Consents    Anesthesia Plan(s) and associated risks, benefits, and realistic alternatives discussed. Questions answered and patient/representative(s) expressed understanding.     - Discussed with:  Patient      - Extended Intubation/Ventilatory Support Discussed: No.      - Patient is DNR/DNI Status: No    Use of blood products discussed: Yes.     - Discussed with: Patient.     - Consented: consented to blood products            Reason for refusal: other.     Postoperative Care    Pain management: IV analgesics, Oral pain medications.   PONV prophylaxis: Ondansetron (or other 5HT-3), Background Propofol Infusion, Dexamethasone or Solumedrol     Comments:                    Kishan Suero MD

## 2021-05-04 NOTE — ANESTHESIA POSTPROCEDURE EVALUATION
Patient: Swathi Gibbons    Procedure(s):  CYSTOURETEROSCOPY, WITH  RETROGRADE PYELOGRAMS, INTERPRETATION OF FLUOROSCOPIC IMAGES, STONE BASKET EXTRACTION, LITHOTRIPSY USING LASER AND LEFT URETERAL STENT REMOVAL AND INSERTION    Diagnosis:Nephrolithiasis [N20.0]  Diagnosis Additional Information: No value filed.    Anesthesia Type:  General    Note:  Disposition: Outpatient   Postop Pain Control: Uneventful            Sign Out: Well controlled pain   PONV: No   Neuro/Psych: Uneventful            Sign Out: Acceptable/Baseline neuro status   Airway/Respiratory: Uneventful            Sign Out: Acceptable/Baseline resp. status   CV/Hemodynamics: Uneventful            Sign Out: Acceptable CV status; No obvious hypovolemia; No obvious fluid overload   Other NRE:    DID A NON-ROUTINE EVENT OCCUR?            Last vitals:  Vitals:    05/04/21 1415 05/04/21 1425 05/04/21 1430   BP: 104/66  104/67   Pulse: 75 74 75   Resp: 10 18 8   Temp:      SpO2: 95% 93% 92%       Last vitals prior to Anesthesia Care Transfer:  CRNA VITALS  5/4/2021 1320 - 5/4/2021 1420      5/4/2021             Resp Rate (observed):  (!) 2          Electronically Signed By: Kishan Suero MD  May 4, 2021  2:53 PM

## 2021-05-04 NOTE — DISCHARGE INSTRUCTIONS
Same-Day Surgery   Adult Discharge Orders & Instructions     For 24 hours after surgery:  1. Get plenty of rest.  A responsible adult must stay with you for at least 24 hours after you leave the hospital.   2. Pain medication can slow your reflexes. Do not drive or use heavy equipment.  If you have weakness or tingling, don't drive or use heavy equipment until this feeling goes away.  3. Mixing alcohol and pain medication can cause dizziness and slow your breathing. It can even be fatal. Do not drink alcohol while taking pain medication.  4. Avoid strenuous or risky activities.  Ask for help when climbing stairs.   5. You may feel lightheaded.  If so, sit for a few minutes before standing.  Have someone help you get up.   6. If you have nausea (feel sick to your stomach), drink only clear liquids such as apple juice, ginger ale, broth or 7-Up.  Rest may also help.  Be sure to drink enough fluids.  Move to a regular diet as you feel able. Take pain medications with a small amount of solid food, such as toast or crackers, to avoid nausea.   7. A slight fever is normal. Call the doctor if your fever is over 100 F (37.7 C) (taken under the tongue) or lasts longer than 24 hours.  8. You may have a dry mouth, muscle aches, trouble sleeping or a sore throat.  These symptoms should go away after 24 hours.  9. Do not make important or legal decisions.   Pain Management:      1. Take pain medication (if prescribed) for pain as directed by your physician.        2. WARNING: If the pain medication you have been prescribed contains Tylenol  (acetaminophen), DO NOT take additional doses of Tylenol (acetaminophen).     Call your doctor for any of the followin.  Signs of infection (fever, growing tenderness at the surgery site, severe pain, a large amount of drainage or bleeding, foul-smelling drainage, redness, swelling).    2.  It has been over 8 to 10 hours since surgery and you are still not able to urinate (pee).    3.   Headache for over 24 hours.    4.  Numbness, tingling or weakness the day after surgery (if you had spinal anesthesia).  To contact a doctor, call Dr. Haynes [phone] or:      [phone] and ask for the Resident On Call for:         Urology  (answered 24 hours a day)      Emergency Department:  [facility]: [phone]  [facility]: [phone]               Rev. 10/2014

## 2021-05-04 NOTE — ANESTHESIA PROCEDURE NOTES
Airway       Patient location during procedure: OR       Procedure Start/Stop Times: 5/4/2021 12:10 PM and 5/4/2021 12:10 PM  Staff -        Anesthesiologist:  Shukri Peralta MD       CRNA: Blanca Ramirez APRN CRNA       Other Anesthesia Staff: Nga Gastelum       Performed By: SRNA  Consent for Airway        Urgency: elective  Indications and Patient Condition       Indications for airway management: crystal-procedural       Induction type:intravenous       Mask difficulty assessment: 0 - not attempted    Final Airway Details       Final airway type: supraglottic airway    Supraglottic Airway Details        Type: LMA       Brand: Ambu AuraGain       LMA size: 4    Post intubation assessment        Placement verified by: capnometry, equal breath sounds and chest rise        Number of attempts at approach: 2       Number of other approaches attempted: 0       Secured with: silk tape       Ease of procedure: easy       Dentition: Intact and Unchanged    Additional Comments       Soft bite block in place

## 2021-05-04 NOTE — OP NOTE
OPERATIVE REPORT    PREOPERATIVE DIAGNOSIS:  Left ureteral and renal stones    POSTOPERATIVE DIAGNOSIS: Same    PROCEDURES PERFORMED:   Cystourethroscopy  Left ureteroscopy  Left holmium laser lithotripsy of multiple stones  Left retrograde pyelogram  Left ureteral stent placement  Intraoperative interpretation of fluoroscopic images    STAFF SURGEON: Luis Antonio Haynes MD  RESIDENT(S): Olga Manzo MD    ANESTHESIA: General  ESTIMATED BLOOD LOSS: 1 ml  COMPLICATIONS: None.   SPECIMEN: Stone for analysis   URETERAL STENT(S):  - Left 6 x 26 cm double-J ureteral stent.  Reason for stenting: Large stone burden (>1.5 cm).      SIGNIFICANT FINDINGS:   -Stone free with no fragments > 2mm on the left  -Left RPG notable for no hydronephrosis.     Target stone location:Both    Stone opacity on fluoroscopy: Radiolucent    Approximate target stone size: 5-10 mm    Laser used: Yes    Multiple stones treated: Yes      BRIEF OPERATIVE INDICATIONS: Swathi Gibbons is a 55 year old female who presented with obstructing left ureteral stones and sepsis. She underwent urgent stent placement on 4/13. After a discussion of all risks, benefits, and alternatives, the patient elected to proceed with definitive stone management. The patient understands the potential need for more than one procedure to eliminate all stone burden.      DESCRIPTION OF PROCEDURE:  After informed consent was obtained, the patient was transported to the operating room & placed supine on the table. After adequate anesthesia was induced, the patient was placed in lithotomy and prepped and draped in the usual sterile fashion. A timeout was taken to confirm correct patient, procedure and laterality. Pre-operative IV antibiotics were administered.     A 22-[place] rigid cystoscope was inserted into a well-lubricated urethra. The urethra was unremarkable without stricture.     The existing left indwelling ureteral stent was identified and removed with the flexible stent  grasper to the level of the urethral meatus. A Sensor wire was advanced up to the renal pelvis and the previous stent was removed.     A semi-rigid ureteroscope was introduced and advanced up the ureter alongside the wire.  Two stones were identified in the left distal ureter- approximately 7 mm and 5 mm. A 200 micron laser fiber was used at a setting of 0.8 J and 8 Hz and lithotripsy was performed.  A tipless basket was used to remove all fragments greater than 2 mm.  We then advanced our scope up to the mid ureter and another 5 mm stone was encountered. This was removed via basket. We then drove our semirigid scope up to the level of the renal pelvis and confirmed the ureter was stone-free. A Super Stiff wire was placed into the kidney through the scope which was then removed.     Next, we placed a 12/14 36 cm ureteral access sheath under fluoroscopic guidance to the level of the distal ureter.  This was moved into the upper pole using a basket. A retrograde pyelogram was then performed to serve as our road map.  There was no significant hydronephrosis, ureter appeared normal. The stone was radiolucent. Complete pyeloscopy was performed and a 1.3 cm stone was seen in the renal pelvis. A 200 micron laser fiber was used at a setting of 0.8 J and 8 Hz and lithotripsy was performed.  A tipless basket was used to remove all fragments greater than 2 mm.  Pullback ureteroscopy confirmed no residual stone fragments or ureteral injury.     A 6 x 26 cm double-J stent was advanced over the Sensor wire, and a good proximal curl was seen in the renal pelvis on fluoroscopy and the distal curl was seen in the bladder. A string was not left attached to the stent.  The bladder was drained.    The patient tolerated the procedure well and there were no apparent complications. The patient  was transported to the postanesthesia care unit in stable condition.        POSTOP PLAN:  - Stent removal in 1 week  - 1 week of Cefdinir for  infection prophylaxis  - Follow-up in 6 weeks with CT stone protocol prior      Disposable items prior to timeout:  Sensor wire  5-[place] open ended catheter    Olga Manzo MD  PGY-3 Urology  Pager [phone]      I was present and scrubbed for the entire procedure.  Luis Antonio Haynes MD  Urology Staff

## 2021-05-04 NOTE — ANESTHESIA CARE TRANSFER NOTE
Patient: Swathi Gibbons    Procedure(s):  CYSTOURETEROSCOPY, WITH  RETROGRADE PYELOGRAMS, INTERPRETATION OF FLUOROSCOPIC IMAGES, STONE BASKET EXTRACTION, LITHOTRIPSY USING LASER AND LEFT URETERAL STENT REMOVAL AND INSERTION    Diagnosis: Nephrolithiasis [N20.0]  Diagnosis Additional Information: No value filed.    Anesthesia Type:   General     Note:    Oropharynx: oropharynx clear of all foreign objects  Level of Consciousness: awake  Oxygen Supplementation: face mask  Level of Supplemental Oxygen (L/min / FiO2): 5  Independent Airway: airway patency satisfactory and stable  Dentition: dentition unchanged  Vital Signs Stable: post-procedure vital signs reviewed and stable  Report to RN Given: handoff report given  Patient transferred to: PACU    Handoff Report: Identifed the Patient, Identified the Reponsible Provider, Reviewed the pertinent medical history, Discussed the surgical course, Reviewed Intra-OP anesthesia mangement and issues during anesthesia, Set expectations for post-procedure period and Allowed opportunity for questions and acknowledgement of understanding      Vitals: (Last set prior to Anesthesia Care Transfer)  CRNA VITALS  5/4/2021 1320 - 5/4/2021 1401      5/4/2021             Resp Rate (observed):  (!) 2        Electronically Signed By: Nga Gastelum  May 4, 2021  2:01 PM

## 2021-05-04 NOTE — PROVIDER NOTIFICATION
Bruises on bilateral arms L>R.  Bruises on shins and lower legs and medial ankles bilaterally.  Cut right 3rd toe around toenail.

## 2021-05-05 ENCOUNTER — TELEPHONE (OUTPATIENT)
Dept: UROLOGY | Facility: CLINIC | Age: 56
End: 2021-05-05

## 2021-05-05 DIAGNOSIS — N39.0 URINARY TRACT INFECTION WITHOUT HEMATURIA, SITE UNSPECIFIED: ICD-10-CM

## 2021-05-05 RX ORDER — NITROFURANTOIN 25; 75 MG/1; MG/1
100 CAPSULE ORAL 2 TIMES DAILY
Qty: 10 CAPSULE | Refills: 0 | Status: SHIPPED | OUTPATIENT
Start: 2021-05-05 | End: 2021-05-13

## 2021-05-05 NOTE — TELEPHONE ENCOUNTER
----- Message from Yessenia Morris RN sent at 5/5/2021 12:06 PM CDT -----  Dr. Haynes would like to see this patient in 6 weeks with Ct scan prior(in addition to)  Please keep the appointment for next week for stent removal (5/13/21)  Orders in system  Yessenia

## 2021-05-05 NOTE — RESULT ENCOUNTER NOTE
Can you let her know I added Macrobid 100mg PO BID for her x 5 days please?    Sent to [facility]    - thanks.  LETY

## 2021-05-05 NOTE — TELEPHONE ENCOUNTER
Left message for pt to call and schedule an in person or virtual visit with Dr. Haynes in 6 weeks with a CT done prior.

## 2021-05-08 DIAGNOSIS — N20.0 NEPHROLITHIASIS: ICD-10-CM

## 2021-05-08 LAB
APPEARANCE STONE: NORMAL
COMPN STONE: NORMAL
NUMBER STONE: NORMAL
SIZE STONE: NORMAL MM
WT STONE: 49 MG

## 2021-05-08 RX ORDER — SOLIFENACIN SUCCINATE 5 MG/1
5 TABLET, FILM COATED ORAL DAILY PRN
Qty: 30 TABLET | Refills: 3 | Status: SHIPPED | OUTPATIENT
Start: 2021-05-08 | End: 2021-05-19

## 2021-05-12 ENCOUNTER — TELEPHONE (OUTPATIENT)
Dept: UROLOGY | Facility: CLINIC | Age: 56
End: 2021-05-12

## 2021-05-12 NOTE — TELEPHONE ENCOUNTER
[facility] Call Center    Phone Message    May a detailed message be left on voicemail: yes     Reason for Call: Other: Pt requesting a call to discuss procedure, if she needs a ride and other related questions.  Pt also feels like she might have a bladder infection.  Please call to discuss at .     Action Taken: Message routed to:  [facility] Urology    Travel Screening: Not Applicable

## 2021-05-12 NOTE — TELEPHONE ENCOUNTER
Called patient and spoke to her about frequency urgency and burning with urination.  I told her that it is probably due to her stent and she is on ceftin antibiotic as well.  Her appointment is tomorrow with juanita and told her to discuss her symptoms tomorrow with MD Vanessa Bowie, LPN Staff Nurse  Push fluids Vanessa Bowie LPN Staff Nurse

## 2021-05-13 ENCOUNTER — TELEPHONE (OUTPATIENT)
Dept: UROLOGY | Facility: CLINIC | Age: 56
End: 2021-05-13

## 2021-05-13 ENCOUNTER — OFFICE VISIT (OUTPATIENT)
Dept: UROLOGY | Facility: CLINIC | Age: 56
End: 2021-05-13
Payer: MEDICARE

## 2021-05-13 VITALS
BODY MASS INDEX: 43.23 KG/M2 | HEIGHT: 63 IN | SYSTOLIC BLOOD PRESSURE: 130 MMHG | WEIGHT: 244 LBS | DIASTOLIC BLOOD PRESSURE: 79 MMHG | HEART RATE: 81 BPM

## 2021-05-13 DIAGNOSIS — B37.31 YEAST INFECTION OF THE VAGINA: Primary | ICD-10-CM

## 2021-05-13 DIAGNOSIS — E66.01 MORBID OBESITY (H): ICD-10-CM

## 2021-05-13 DIAGNOSIS — N20.0 NEPHROLITHIASIS: ICD-10-CM

## 2021-05-13 PROCEDURE — 99207 PR NO CHARGE LOS: CPT | Performed by: UROLOGY

## 2021-05-13 RX ORDER — LIDOCAINE HYDROCHLORIDE 20 MG/ML
JELLY TOPICAL ONCE
Status: COMPLETED | OUTPATIENT
Start: 2021-05-13 | End: 2021-05-13

## 2021-05-13 RX ORDER — FLUCONAZOLE 200 MG/1
200 TABLET ORAL
Qty: 3 TABLET | Refills: 1 | Status: SHIPPED | OUTPATIENT
Start: 2021-05-13 | End: 2021-06-25

## 2021-05-13 RX ADMIN — LIDOCAINE HYDROCHLORIDE: 20 JELLY TOPICAL at 13:30

## 2021-05-13 ASSESSMENT — MIFFLIN-ST. JEOR: SCORE: 1670.91

## 2021-05-13 ASSESSMENT — PAIN SCALES - GENERAL: PAINLEVEL: SEVERE PAIN (6)

## 2021-05-13 NOTE — NURSING NOTE
"Chief Complaint   Patient presents with     Cystoscopy     with stent removal       Blood pressure 130/79, pulse 81, height 1.6 m (5' 3\"), weight 110.7 kg (244 lb). Body mass index is 43.22 kg/m .    Patient Active Problem List   Diagnosis     Chronic rhinitis     Psoriasis     Latent tuberculosis     Hypothyroidism     Secondary hyperparathyroidism (H)     Alcohol use disorder, severe, in sustained remission (H)     MRSA (methicillin resistant staph aureus) culture positive     Esophageal dysmotility     Left ureteral stone     Nephrolithiasis       Allergies   Allergen Reactions     Avelox [Moxifloxacin] Anaphylaxis     Requiring epinephrine     Cefaclor Hives     Tolerated cephalexin, tolerated cefepime     Sulfa Drugs Hives       Current Outpatient Medications   Medication Sig Dispense Refill     acetaminophen (TYLENOL) 325 MG tablet Take 325-650 mg by mouth every 4 hours as needed for mild pain       albuterol (PROAIR HFA/PROVENTIL HFA/VENTOLIN HFA) 108 (90 Base) MCG/ACT inhaler Inhale 2 puffs into the lungs every 4 hours as needed for shortness of breath / dyspnea or wheezing (VENTOLIN)       buPROPion (WELLBUTRIN XL) 300 MG 24 hr tablet Take 300 mg by mouth every morning        calcitonin, salmon, (MIACALCIN) 200 UNIT/ACT nasal spray Spray 1 spray into one nostril alternating nostrils daily Alternate nostril each day.       cefdinir (OMNICEF) 300 MG capsule Take 1 capsule (300 mg) by mouth 2 times daily 14 capsule 0     cefdinir (OMNICEF) 300 MG capsule Take 1 capsule (300 mg) by mouth 2 times daily (start after completing IV antibiotics, continue until 3 days after urologic procedure) 42 capsule 0     cycloSPORINE (RESTASIS) 0.05 % ophthalmic emulsion Place 1 drop into both eyes 2 times daily       Diclofenac-Na Hyaluron-Niacin 3-2-4 % GEL Externally apply 1-4 g topically 4 times daily as needed       diltiazem (CARDIZEM) 60 MG tablet Take 60 mg by mouth 3 times daily       doxycycline hyclate (VIBRAMYCIN) " 50 MG capsule Take 50 mg by mouth every evening       fentaNYL (DURAGESIC) 12 mcg/hr 72 hr patch Place 1 patch onto the skin every 72 hours remove old patch.       fentaNYL (DURAGESIC) 25 mcg/hr 72 hr patch Place 1 patch onto the skin every 72 hours remove old patch.       ferrous sulfate (FEROSUL) 325 (65 Fe) MG tablet Take 325 mg by mouth daily (with breakfast)       fish oil-omega-3 fatty acids 1000 MG capsule Take 2 g by mouth 2 times daily       fluconazole (DIFLUCAN) 200 MG tablet Take 200 mg by mouth daily as needed       fluticasone (FLONASE) 50 MCG/ACT nasal spray Spray 2 sprays into both nostrils daily        fluticasone-salmeterol (ADVAIR) 100-50 MCG/DOSE inhaler Inhale 1 puff into the lungs every 12 hours       furosemide (LASIX) 20 MG tablet Take 20 mg by mouth every other day       gabapentin (NEURONTIN) 300 MG capsule Take 900 mg by mouth 3 times daily (300MG X 3 = 900MG)       hydrOXYzine (ATARAX) 25 MG tablet Take 25 mg by mouth every 4 hours as needed for anxiety or other (pain)       imiquimod (ALDARA) 5 % external cream Apply topically nightly as needed       leflunomide (ARAVA) 20 MG tablet Take 20 mg by mouth every evening        levothyroxine (SYNTHROID/LEVOTHROID) 75 MCG tablet Take 75 mcg by mouth daily       Lidocaine (LIDOCARE) 4 % Patch Place 2 patches onto the skin every 12 hours as needed for moderate pain       lidocaine-prilocaine (EMLA) 2.5-2.5 % external cream Apply topically as needed for moderate pain       lurasidone (LATUDA) 20 MG TABS tablet Take 20 mg by mouth At Bedtime        metFORMIN (GLUCOPHAGE-XR) 500 MG 24 hr tablet Take 500 mg by mouth daily (with breakfast) (500MG X 2 = 1,000MG)       methylPREDNISolone (MEDROL) 4 MG tablet Take 8 mg by mouth daily        modafinil (PROVIGIL) 200 MG tablet Take 200 mg by mouth daily as needed        morphine (MSIR) 15 MG IR tablet Take 15-30 mg by mouth 2 times daily as needed for severe pain        multivitamin, therapeutic  (THERA-VIT) TABS tablet Take 1 tablet by mouth daily       naloxone (NARCAN) 4 MG/0.1ML nasal spray Spray 4 mg into one nostril alternating nostrils once as needed for opioid reversal every 2-3 minutes until assistance arrives       nitroFURantoin macrocrystal-monohydrate (MACROBID) 100 MG capsule Take 1 capsule (100 mg) by mouth 2 times daily 10 capsule 0     NONFORMULARY Take 500 mg by mouth daily as needed Rajat leaf extract       nystatin (MYCOSTATIN) 746215 UNIT/GM external cream Apply topically 2 times daily as needed for dry skin        nystatin (MYCOSTATIN) 553619 UNIT/GM external powder Apply topically 4 times daily as needed Under abdominal wall        omeprazole (PRILOSEC) 20 MG DR capsule Take 20 mg by mouth 2 times daily        oxyCODONE (OXYCONTIN) 15 MG 12 hr tablet Take 15 mg by mouth 3 times daily       phentermine (ADIPEX-P) 37.5 MG tablet Take 18.75 mg by mouth daily (37.5MG X 0.5 = 18.75MG)       polyethylene glycol (MIRALAX) 17 g packet Take 1 packet by mouth daily as needed        polyvinyl alcohol (LIQUIFILM TEARS) 1.4 % ophthalmic solution Place 1 drop into both eyes 3 times daily as needed for dry eyes       pravastatin (PRAVACHOL) 80 MG tablet Take 80 mg by mouth At Bedtime       secukinumab (COSENTYX) 150 MG/ML Sensoready pen Inject 300 mg Subcutaneous every 28 days       senna-docusate (SENOKOT-S/PERICOLACE) 8.6-50 MG tablet Take 1-2 tablets by mouth 2 times daily       solifenacin (VESICARE) 5 MG tablet Take 1 tablet (5 mg) by mouth daily as needed (bladder spasms) 30 tablet 3     solifenacin (VESICARE) 5 MG tablet Take 1 tablet (5 mg) by mouth daily as needed ((if you are having bladder spasms)) 45 tablet 0     topiramate (TOPAMAX) 50 MG tablet Take 50 mg by mouth 2 times daily       traZODone (DESYREL) 100 MG tablet Take 100 mg by mouth At Bedtime        varenicline (CHANTIX) 1 MG tablet Take 1 mg by mouth 2 times daily       venlafaxine (EFFEXOR-XR) 150 MG 24 hr capsule Take 150 mg  by mouth daily       vitamin D2 (ERGOCALCIFEROL) 84809 units (1250 mcg) capsule Take 50,000 Units by mouth four times a week        Xylitol 500 MG DISK Take 1 Dose by mouth every evening as needed         Social History     Tobacco Use     Smoking status: Former Smoker     Packs/day: 0.50     Types: Cigarettes     Quit date: 2018     Years since quitting: 3.2     Smokeless tobacco: Never Used   Substance Use Topics     Alcohol use: Not Currently     Frequency: Never     Comment: sober for 6 years     Drug use: No       Invasive Procedure Safety Checklist:    Procedure: Cystoscopy    Action: Complete sections and checkboxes as appropriate.    Pre-procedure:  1. Patient ID Verified with 2 identifiers (Janel and  or MRN) : YES    2. Procedure and site verified with patient/designee (when able) : YES    3. Accurate consent documentation in medical record : YES    4. H&P (or appropriate assessment) documented in medical record : N/A  H&P must be up to 30 days prior to procedure an updated within 24 hours of                 Procedure as applicable.     5. Relevant diagnostic and radiology test results appropriately labeled and displayed as applicable : YES    6. Blood products, implants, devices, and/or special equipment available for the procedure as applicable : YES    7. Procedure site(s) marked with provider initials [Exclusions: none] : NO    8. Marking not required. Reason : Yes  Procedure does not require site marking    Time Out:     Time-Out performed immediately prior to starting procedure, including verbal and active participation of all team members addressing: YES    1. Correct patient identity.  2. Confirmed that the correct side and site are marked.  3. An accurate procedure to be done.  4. Agreement on the procedure to be done.  5. Correct patient position.  6. Relevant images and results are properly labeled and appropriately displayed.  7. The need to administer antibiotics or fluids for irrigation  purposes during the procedure as applicable.  8. Safety precautions based on patient history or medication use.    During Procedure: Verification of correct person, site, and procedure occurs any time the responsibility for care of the patient is transferred to another member of the care team.    The following medication was given:     MEDICATION:  Lidocaine without epinephrine 2% jelly  ROUTE: urethral   SITE: urethral   DOSE: 10 mL  LOT #: [id number]  : [employer]  EXPIRATION DATE: 11/22  NDC#: [address]-1   Was there drug waste? No    Prior to med admin, verified patient identity using patient's name and date of birth.  Due to med administration, patient instructed to remain in clinic for 15 minutes  afterwards, and to report any adverse reaction to me immediately.    Drug Amount Wasted:  None.  Vial/Syringe: Zain Sullivan EMT  5/13/2021  12:09 PM

## 2021-05-13 NOTE — PROGRESS NOTES
Pre-procedure diagnosis:  Nephrolithiasis  Post-procedure diagnosis: Nephrolithiasis, left stent removed.  Procedure performed:  Cystourethroscopy with left ureteral stent removal  Surgeon:    TINO Haynes MD  Anesthesia:    Local    Indications for procedure:   Swathi Gibbons is a 55 year old female with a history of nephrolithiasis, now s/p URS and ureteral stent placement.  She is here today for stent removal.  Having some bladder symptoms consistent with stent, but might be UTI symptoms.  She is on antibiotics right now. PO antibiotic choices are limited due to allergy and resistance spectrum of her last UCx.  Last UA was not concerning for significant UTI or pyuria, however.    Description of procedure:   After fully informed, voluntary consent was obtained, the patient was brought into the procedure room, identified and placed in a supine position on the cysto table.  She was prepped and draped in a sterile fashion with betadine.  A 15F flexible cystoscope was inserted into the urethra and the bladder and urethra examined in a systematic manner.  The patient tolerated the procedure well and there were no complications.      Cystoscopic findings:  The urethra was normal without strictures.  The ureteral stent was grasped with a flexible stent grasper and removed in its entirety.      Assessment/Plan:   Swathi Gibbons is a 55 year old female with a history of nephrolithiasis, now with left ureteral stent removed.     - finish course of antibiotics.  - Litholink 24h urine in next 6-8 weeks to prevent growth of current stones and formation of new stones, follow-up with Dr. Gutierrez to discuss stone prevention.  - she has lower urinary tract symptoms- likely secondary to stent.  Advised to phone in 24 hrs if bladder symptoms not improved with stent out, so we can get her a different antibiotic prior to the weekend.      Jaiden WOODARD

## 2021-05-13 NOTE — TELEPHONE ENCOUNTER
Patient information faxed to [facility] ([phone]).     Franko Sullivan EMT  5/13/21  1:31 PM

## 2021-05-13 NOTE — LETTER
5/13/2021       RE: Swathi Gibbons  [address]  [place] [facility] [place] [address]     Dear Colleague,    Thank you for referring your patient, Swathi Gibbons, to the [facility] [place] at [facility]. Please see a copy of my visit note below.    Pre-procedure diagnosis:  Nephrolithiasis  Post-procedure diagnosis: Nephrolithiasis, left stent removed.  Procedure performed:  Cystourethroscopy with left ureteral stent removal  Surgeon:    TINO Haynes MD  Anesthesia:    Local    Indications for procedure:   Swathi Gibbons is a 55 year old female with a history of nephrolithiasis, now s/p URS and ureteral stent placement.  She is here today for stent removal.  Having some bladder symptoms consistent with stent, but might be UTI symptoms.  She is on antibiotics right now. PO antibiotic choices are limited due to allergy and resistance spectrum of her last UCx.  Last UA was not concerning for significant UTI or pyuria, however.    Description of procedure:   After fully informed, voluntary consent was obtained, the patient was brought into the procedure room, identified and placed in a supine position on the cysto table.  She was prepped and draped in a sterile fashion with betadine.  A 15F flexible cystoscope was inserted into the urethra and the bladder and urethra examined in a systematic manner.  The patient tolerated the procedure well and there were no complications.      Cystoscopic findings:  The urethra was normal without strictures.  The ureteral stent was grasped with a flexible stent grasper and removed in its entirety.      Assessment/Plan:   Swathi Gibbons is a 55 year old female with a history of nephrolithiasis, now with left ureteral stent removed.     - finish course of antibiotics.  - Litholink 24h urine in next 6-8 weeks to prevent growth of current stones and formation of new stones, follow-up with Dr. Gutierrez to  discuss stone prevention.  - she has lower urinary tract symptoms- likely secondary to stent.  Advised to phone in 24 hrs if bladder symptoms not improved with stent out, so we can get her a different antibiotic prior to the weekend.      Jaiden WOODARD

## 2021-05-14 ENCOUNTER — MYC MEDICAL ADVICE (OUTPATIENT)
Dept: UROLOGY | Facility: CLINIC | Age: 56
End: 2021-05-14

## 2021-05-14 ENCOUNTER — PATIENT OUTREACH (OUTPATIENT)
Dept: UROLOGY | Facility: CLINIC | Age: 56
End: 2021-05-14

## 2021-05-14 NOTE — TELEPHONE ENCOUNTER
Sent message to see how the patient is doing since her procedure 5/4/21 with Dr. Haynes. She will be coming in 6/24/21 with Dr. Haynes.    Yessenia Morris, RN   Care Coordinator Urology

## 2021-05-18 DIAGNOSIS — N20.0 NEPHROLITHIASIS: ICD-10-CM

## 2021-05-19 RX ORDER — SOLIFENACIN SUCCINATE 5 MG/1
TABLET, FILM COATED ORAL
Qty: 90 TABLET | Refills: 0 | Status: SHIPPED | OUTPATIENT
Start: 2021-05-19 | End: 2021-06-25

## 2021-05-19 NOTE — TELEPHONE ENCOUNTER
SOLIFENACIN 5MG TABLETS      TAKE 1 TABLET(5 MG) BY MOUTH DAILY AS NEEDED FOR BLADDER SPASMS  Last Written Prescription Date:  5-8-21  Last Fill Quantity: 30,   # refills: 3  Last Office Visit : 5-13-21  Future Office visit:  6-21-21    See tele note: 5-7-21 ( pt requesting med BID)    Routing refill request to provider for review/approval because:  Pt requesting 90 day- ? Authorize 90 day

## 2021-05-24 ENCOUNTER — RECORDS - HEALTHEAST (OUTPATIENT)
Dept: ADMINISTRATIVE | Facility: CLINIC | Age: 56
End: 2021-05-24

## 2021-05-26 ENCOUNTER — RECORDS - HEALTHEAST (OUTPATIENT)
Dept: ADMINISTRATIVE | Facility: CLINIC | Age: 56
End: 2021-05-26

## 2021-05-30 ENCOUNTER — RECORDS - HEALTHEAST (OUTPATIENT)
Dept: ADMINISTRATIVE | Facility: CLINIC | Age: 56
End: 2021-05-30

## 2021-06-05 ENCOUNTER — HEALTH MAINTENANCE LETTER (OUTPATIENT)
Age: 56
End: 2021-06-05

## 2021-06-05 ENCOUNTER — RECORDS - HEALTHEAST (OUTPATIENT)
Dept: CARDIOLOGY | Facility: HOSPITAL | Age: 56
End: 2021-06-05

## 2021-06-05 VITALS — BODY MASS INDEX: 44.08 KG/M2 | WEIGHT: 248.8 LBS | HEIGHT: 63 IN

## 2021-06-05 DIAGNOSIS — R07.9 CHEST PAIN: ICD-10-CM

## 2021-06-10 ENCOUNTER — PRE VISIT (OUTPATIENT)
Dept: UROLOGY | Facility: CLINIC | Age: 56
End: 2021-06-10

## 2021-06-10 NOTE — TELEPHONE ENCOUNTER
Reason for visit: Follow up     Relevant information: review imaging    Records/imaging/labs/orders: in Epic; CT ordered    Pt called: no    At Rooming: normal

## 2021-06-12 ENCOUNTER — ANCILLARY PROCEDURE (OUTPATIENT)
Dept: CT IMAGING | Facility: CLINIC | Age: 56
End: 2021-06-12
Payer: MEDICARE

## 2021-06-12 DIAGNOSIS — N20.0 NEPHROLITHIASIS: ICD-10-CM

## 2021-06-12 DIAGNOSIS — N39.0 URINARY TRACT INFECTION WITHOUT HEMATURIA, SITE UNSPECIFIED: ICD-10-CM

## 2021-06-12 DIAGNOSIS — N20.0 CALCIUM NEPHROLITHIASIS: ICD-10-CM

## 2021-06-12 LAB
ALBUMIN UR-MCNC: NEGATIVE MG/DL
APPEARANCE UR: CLEAR
BACTERIA #/AREA URNS HPF: ABNORMAL /HPF
BILIRUB UR QL STRIP: NEGATIVE
COLOR UR AUTO: YELLOW
GLUCOSE UR STRIP-MCNC: NEGATIVE MG/DL
HGB UR QL STRIP: NEGATIVE
KETONES UR STRIP-MCNC: NEGATIVE MG/DL
LEUKOCYTE ESTERASE UR QL STRIP: NEGATIVE
MUCOUS THREADS #/AREA URNS LPF: PRESENT /LPF
NITRATE UR QL: NEGATIVE
PH UR STRIP: 5 PH (ref 5–7)
RBC #/AREA URNS AUTO: 5 /HPF (ref 0–2)
SOURCE: ABNORMAL
SP GR UR STRIP: 1.02 (ref 1–1.03)
SQUAMOUS #/AREA URNS AUTO: 2 /HPF (ref 0–1)
UROBILINOGEN UR STRIP-MCNC: 0 MG/DL (ref 0–2)
WBC #/AREA URNS AUTO: 6 /HPF (ref 0–5)

## 2021-06-12 PROCEDURE — 87086 URINE CULTURE/COLONY COUNT: CPT | Performed by: UROLOGY

## 2021-06-12 PROCEDURE — 81001 URINALYSIS AUTO W/SCOPE: CPT | Performed by: PATHOLOGY

## 2021-06-12 PROCEDURE — 87186 SC STD MICRODIL/AGAR DIL: CPT | Performed by: UROLOGY

## 2021-06-12 PROCEDURE — 87088 URINE BACTERIA CULTURE: CPT | Performed by: UROLOGY

## 2021-06-12 PROCEDURE — G1004 CDSM NDSC: HCPCS | Performed by: RADIOLOGY

## 2021-06-12 PROCEDURE — 74176 CT ABD & PELVIS W/O CONTRAST: CPT | Mod: MG | Performed by: RADIOLOGY

## 2021-06-14 DIAGNOSIS — N30.00 ACUTE CYSTITIS WITHOUT HEMATURIA: Primary | ICD-10-CM

## 2021-06-14 LAB
BACTERIA SPEC CULT: ABNORMAL
Lab: ABNORMAL
SPECIMEN SOURCE: ABNORMAL

## 2021-06-14 RX ORDER — NITROFURANTOIN 25; 75 MG/1; MG/1
100 CAPSULE ORAL 2 TIMES DAILY
Qty: 14 CAPSULE | Refills: 1 | Status: SHIPPED | OUTPATIENT
Start: 2021-06-14 | End: 2021-06-25

## 2021-06-15 NOTE — ANESTHESIA PROCEDURE NOTES
Peripheral Block    Patient location during procedure: pre-op  Start time: 3/16/2021 9:36 AM  End time: 3/16/2021 9:37 AM  post-op analgesia per surgeon order as noted in medical record  Staffing:  Performing  Anesthesiologist: Giovany Jennings MD  Preanesthetic Checklist  Completed: patient identified, site marked, risks, benefits, and alternatives discussed, timeout performed, consent obtained, airway assessed, oxygen available, suction available, emergency drugs available and hand hygiene performed  Peripheral Block  Block type: other, superficial cervical plexus  Prep: ChloraPrep  Patient position: sitting  Patient monitoring: cardiac monitor, continuous pulse oximetry, heart rate and blood pressure  Laterality: left  Injection technique: ultrasound guided    Ultrasound used to visualize needle placement in proximity to nerve being blocked: yes   US used to visualize anesthetic spread  Visualized anatomic structures normal  No Pathological Findings  Permanent ultrasound image captured for medical record  Sterile gel and probe cover used for ultrasound.  Needle  Needle type: Stimuplex   Needle gauge: 20G  Needle length: 4 in  no peripheral nerve catheter placed  Assessment  Injection assessment: no difficulty with injection, negative aspiration for heme, no paresthesia on injection and incremental injection

## 2021-06-15 NOTE — RESULT ENCOUNTER NOTE
Dear Tila,     Here are your recent results.   The urine does show a persisting infection.  If you are symptomatic, I recommended treating this.  Antibiotic selection is limited due to your medication allergies and the resistance spectrum of the bacteria species present.    I have sent a prescription for nitrofurantoin to [facility].  This should hopefully cover the UTI.  The other oral medication possibilities you are allergic to.  If symptoms are not getting better on nitrofurantoin, let us know ASAP and we can get a referral to infectious disease clinic.    Please let us know if you have any questions.   Thank You,    Jaiden WOODARD

## 2021-06-15 NOTE — ANESTHESIA PROCEDURE NOTES
Peripheral Block    Patient location during procedure: pre-op  Start time: 3/16/2021 9:32 AM  End time: 3/16/2021 9:35 AM  post-op analgesia per surgeon order as noted in medical record  Staffing:  Performing  Anesthesiologist: Giovany Jennings MD  Preanesthetic Checklist  Completed: patient identified, site marked, risks, benefits, and alternatives discussed, timeout performed, consent obtained, airway assessed, oxygen available, suction available, emergency drugs available and hand hygiene performed  Peripheral Block  Block type: brachial plexus, interscalene  Prep: ChloraPrep  Patient position: sitting  Patient monitoring: cardiac monitor, continuous pulse oximetry, heart rate and blood pressure  Laterality: left  Injection technique: ultrasound guided and nerve stimulator  Nerve Stimulator mAmp: 0.4  Ultrasound used to visualize needle placement in proximity to nerve being blocked: yes   US used to visualize anesthetic spread  Visualized anatomic structures normal  No Pathological Findings  Permanent ultrasound image captured for medical record  Sterile gel and probe cover used for ultrasound.  Needle  Needle type: Stimuplex   Needle gauge: 20G  Needle length: 4 in  no peripheral nerve catheter placed  Assessment  Injection assessment: no difficulty with injection, negative aspiration for heme, no paresthesia on injection and incremental injection

## 2021-06-16 NOTE — ANESTHESIA POSTPROCEDURE EVALUATION
Patient: Swathi Gibbons  Procedure(s):  REVERSE TOTAL SHOULDER ARTHROPLASTY (Left)  Anesthesia type: general    Patient location: PACU  Last vitals:   Vitals Value Taken Time   /65 03/16/21 1338   Temp 36.6  C (97.8  F) 03/16/21 1310   Pulse 79 03/16/21 1338   Resp 18 03/16/21 1338   SpO2 95 % 03/16/21 1338     Post vital signs: stable  Level of consciousness: awake and responds to simple questions  Post-anesthesia pain: pain controlled  Post-anesthesia nausea and vomiting: no  Pulmonary: unassisted, face mask  Cardiovascular: stable and blood pressure at baseline  Hydration: adequate  Anesthetic events: no    QCDR Measures:  ASA# 11 - Keyla-op Cardiac Arrest: ASA11B - Patient did NOT experience unanticipated cardiac arrest  ASA# 12 - Keyla-op Mortality Rate: ASA12B - Patient did NOT die  ASA# 13 - PACU Re-Intubation Rate: ASA13B - Patient did NOT require a new airway mgmt  ASA# 10 - Composite Anes Safety: ASA10A - No serious adverse event    Additional Notes:

## 2021-06-16 NOTE — ANESTHESIA CARE TRANSFER NOTE
Last vitals:   Vitals:    03/16/21 1209   BP: 127/76   Pulse: 86   Resp: 10   Temp: 36.6  C (97.9  F)   SpO2: 93%     Patient's level of consciousness is drowsy  Spontaneous respirations: yes  Maintains airway independently: yes  Dentition unchanged: yes  Oropharynx: oropharynx clear of all foreign objects    QCDR Measures:  ASA# 20 - Surgical Safety Checklist: WHO surgical safety checklist completed prior to induction    PQRS# 430 - Adult PONV Prevention: 4558F - Pt received => 2 anti-emetic agents (different classes) preop & intraop  ASA# 8 - Peds PONV Prevention: NA - Not pediatric patient, not GA or 2 or more risk factors NOT present  PQRS# 424 - Keyla-op Temp Management: 4559F - At least one body temp DOCUMENTED => 35.5C or 95.9F within required timeframe  PQRS# 426 - PACU Transfer Protocol: - Transfer of care checklist used  ASA# 14 - Acute Post-op Pain: ASA14B - Patient did NOT experience pain >= 7 out of 10

## 2021-06-17 ENCOUNTER — TRANSFERRED RECORDS (OUTPATIENT)
Dept: HEALTH INFORMATION MANAGEMENT | Facility: CLINIC | Age: 56
End: 2021-06-17

## 2021-06-24 ENCOUNTER — OFFICE VISIT (OUTPATIENT)
Dept: UROLOGY | Facility: CLINIC | Age: 56
End: 2021-06-24
Payer: MEDICARE

## 2021-06-24 VITALS
WEIGHT: 238 LBS | BODY MASS INDEX: 42.17 KG/M2 | HEART RATE: 87 BPM | HEIGHT: 63 IN | SYSTOLIC BLOOD PRESSURE: 122 MMHG | DIASTOLIC BLOOD PRESSURE: 73 MMHG

## 2021-06-24 DIAGNOSIS — N30.00 ACUTE CYSTITIS WITHOUT HEMATURIA: ICD-10-CM

## 2021-06-24 DIAGNOSIS — N20.0 KIDNEY STONE: Primary | ICD-10-CM

## 2021-06-24 DIAGNOSIS — N28.1 ACQUIRED CYST OF KIDNEY: ICD-10-CM

## 2021-06-24 PROCEDURE — 99213 OFFICE O/P EST LOW 20 MIN: CPT | Performed by: UROLOGY

## 2021-06-24 ASSESSMENT — PAIN SCALES - GENERAL: PAINLEVEL: SEVERE PAIN (6)

## 2021-06-24 ASSESSMENT — MIFFLIN-ST. JEOR: SCORE: 1643.69

## 2021-06-24 NOTE — LETTER
"6/24/2021       RE: Swathi Gibbons  [address]  [place] [facility] [place] [address]     Dear Colleague,    Thank you for referring your patient, Swathi Gibbons, to the [facility] [place] at [facility]. Please see a copy of my visit note below.    HPI:  Swathi Gibbons is a 55 year old female being seen for follow-up imaging.   she received antibiotics recently for UTI symptoms, these have resolved.    S/p L ureteroscopy holmium laser lithotripsy for 13mm left renal stone 5/4/21  Here for CT review to make sure no residual fragments.    Exam:  Physical Exam  /73   Pulse 87   Ht 1.6 m (5' 3\")   Wt 108 kg (238 lb)   BMI 42.16 kg/m      General: Alert, oriented, nad.  Pleasant and conversant.  Eyes: anicteric, EOMI.  Pulse: regular  Resps: normal, non-labored.  Abdomen:  Nondistended.  Neurological - no tremors  Skin - no discoloration/ lesions noted   exam deferred.      Review of Imaging:  The following imaging exams were independently viewed and interpreted by me and discussed with patient:  Reviewed CT abdomen/pelvis 6/12/21, she is stone free at this time.    Review of Labs:  The following labs were reviewed by me and discussed with the patient:  None     Assessment & Plan      Simple renal cysts - no follow-up required.    UTI resolved- likely secondary to  Instrumentation.    Nephrolithiasis- S/p URS, holmium laser lithotripsy.  Now completely stone free.    PRN MD follow-up  Follow-up with Arpita Griffin tomorrow to review litholink results.      Luis Antonio Haynes MD  [facility] [place]      ==========================      Additional Coding Information:    Problems:  3 -- one acute uncomplicated illness or injury    Data Reviewed  CT scan as above    Level of risk:  3 -- low risk (e.g., OTC medication or observation, minor surgery without risks)    Time spent:  20 minutes spent on the date " of the encounter doing chart review, history and exam, documentation and further activities per the note      Luis Antonio Haynes MD

## 2021-06-24 NOTE — PROGRESS NOTES
"HPI:  Swathi Gibbons is a 55 year old female being seen for follow-up imaging.   she received antibiotics recently for UTI symptoms, these have resolved.    S/p L ureteroscopy holmium laser lithotripsy for 13mm left renal stone 5/4/21  Here for CT review to make sure no residual fragments.    Exam:  Physical Exam  /73   Pulse 87   Ht 1.6 m (5' 3\")   Wt 108 kg (238 lb)   BMI 42.16 kg/m      General: Alert, oriented, nad.  Pleasant and conversant.  Eyes: anicteric, EOMI.  Pulse: regular  Resps: normal, non-labored.  Abdomen:  Nondistended.  Neurological - no tremors  Skin - no discoloration/ lesions noted   exam deferred.      Review of Imaging:  The following imaging exams were independently viewed and interpreted by me and discussed with patient:  Reviewed CT abdomen/pelvis 6/12/21, she is stone free at this time.    Review of Labs:  The following labs were reviewed by me and discussed with the patient:  None     Assessment & Plan      Simple renal cysts - no follow-up required.    UTI resolved- likely secondary to  Instrumentation.    Nephrolithiasis- S/p URS, holmium laser lithotripsy.  Now completely stone free.    PRN MD follow-up  Follow-up with Arpita Griffin tomorrow to review litholink results.      Luis Antonio Haynes MD  [facility] [place]      ==========================      Additional Coding Information:    Problems:  3 -- one acute uncomplicated illness or injury    Data Reviewed  CT scan as above    Level of risk:  3 -- low risk (e.g., OTC medication or observation, minor surgery without risks)    Time spent:  20 minutes spent on the date of the encounter doing chart review, history and exam, documentation and further activities per the note          "

## 2021-06-25 ENCOUNTER — VIRTUAL VISIT (OUTPATIENT)
Dept: UROLOGY | Facility: CLINIC | Age: 56
End: 2021-06-25
Payer: MEDICARE

## 2021-06-25 DIAGNOSIS — N20.0 KIDNEY STONE: ICD-10-CM

## 2021-06-25 DIAGNOSIS — N39.0 RECURRENT UTI: Primary | ICD-10-CM

## 2021-06-25 PROCEDURE — 99213 OFFICE O/P EST LOW 20 MIN: CPT | Mod: 95 | Performed by: NURSE PRACTITIONER

## 2021-06-25 NOTE — PATIENT INSTRUCTIONS
UROLOGY CLINIC VISIT PATIENT INSTRUCTIONS    -Will check a UA/UC now at the lab. I will be in touch with you via [email or web address] with results.   -Follow up with me in 6 months.     If you have any issues, questions or concerns in the meantime, do not hesitate to contact us at [phone] or via [email or web address].     It was a pleasure meeting with you today.  Thank you for allowing me and my team the privilege of caring for you today.  YOU are the reason we are here, and I truly hope we provided you with the excellent service you deserve.  Please let us know if there is anything else we can do for you so that we can be sure you are leaving completely satisfied with your care experience.    Lo Griffin, CNP

## 2021-06-25 NOTE — PROGRESS NOTES
Tila is a 55 year old who is being evaluated via a billable video visit.      How would you like to obtain your AVS? KeepTruckinhart  If the video visit is dropped, the invitation should be resent by: Text to cell phone: [phone]    Video Start Time: 1:51 PM  Video-Visit Details    Type of service:  Video Visit    Video End Time: 2:25 PM    Originating Location (pt. Location): Home    Distant Location (provider location):  [facility] UROLOGY [facility]IN[facility] [place]     Platform used for Video Visit: Allvoices       Swathi Gibbons complains of   Chief Complaint   Patient presents with     Follow Up     test results       Assessment/Plan:  55 year old female, s/p URS/HLL with Dr. Haynes for infected ureteral stones. Stone composition (CaP and struvite), as well as Litholink results showing high urine pH, suggests that her stone growth is likely due to infection by urea-splitting organism. Her recent UC did show Klebsiella. No overt UTI symptoms today but she notes that her urine has an odor.   -Will repeat UA/UC now to evaluate for ongoing UTI. If Klebsiella species still present, will need to treat and follow to ensure clearance to prevent further stone growth. I will be in touch with her via ManageSocial with UC results and next steps.  -Would otherwise like her to follow up with me in 6 months.     Lo Griffin CNP  Department of Urology      Subjective:   55 year old female who presents today for follow up regarding kidney stones. She is s/p left-sided URS/HLL with Dr. Haynes on 5/4/21. She had been admitted on 4/12/21 for urosepsis 2/2 obstructive left ureteral stones x2. Underwent urgent stenting with Dr. Haynes and treated with IV antibiotics. Stone composed of 10% struvite and 90% CaP.     Follow up CT A/P obtained on 6/12/21 showed no stones or hydronephrosis. Simple left renal cysts.      Litholink, collected on 6/17, shows hyperoxaluria, high urine pH, hyperuricosuria, and mild CaOx and CaP stone risk. Of note,  "her urine culture obtained a few days prior to collection grew Klebsiella. Suspect that this significantly impacted her urine chemistry results.     Today, she states that she does not have any obvious symptoms to suggest an ongoing UTI, but states that her urine just smells \"off.\" She otherwise denies issues with urination, including difficulty emptying, incontinence, catheter use, etc.     Objective:     Exam:   Patient is a 55 year old female   General Appearance: Well groomed, hygenic  Respiratory: No cough, no respiratory distress or labored breathing  Musculoskeletal: Grossly normal with no gross deficits  Skin: No discoloration or apparent rashes  Neurologic: No tremors  Psychiatric: Alert and oriented  Further examination is deferred due to the nature of our visit.             "

## 2021-06-25 NOTE — LETTER
6/25/2021       RE: Swathi Gibbons  [address]  [place] [facility] [address]     Dear Colleague,    Thank you for referring your patient, Swathi Gibbons, to the [facility] UROLOGY CLINIC [place] at [facility]. Please see a copy of my visit note below.    Tila is a 55 year old who is being evaluated via a billable video visit.      How would you like to obtain your AVS? MyChart  If the video visit is dropped, the invitation should be resent by: Text to cell phone: [phone]    Video Start Time: 1:51 PM  Video-Visit Details    Type of service:  Video Visit    Video End Time: 2:25 PM    Originating Location (pt. Location): Home    Distant Location (provider location):  [facility] UROLOGY [facility]     Platform used for Video Visit: medidametrics       Swathi Gibbons complains of   Chief Complaint   Patient presents with     Follow Up     test results       Assessment/Plan:  55 year old female, s/p URS/HLL with Dr. Haynes for infected ureteral stones. Stone composition (CaP and struvite), as well as Litholink results showing high urine pH, suggests that her stone growth is likely due to infection by urea-splitting organism. Her recent UC did show Klebsiella. No overt UTI symptoms today but she notes that her urine has an odor.   -Will repeat UA/UC now to evaluate for ongoing UTI. If Klebsiella species still present, will need to treat and follow to ensure clearance to prevent further stone growth. I will be in touch with her via [email or web address] with UC results and next steps.  -Would otherwise like her to follow up with me in 6 months.     Lo Griffin, CNP  Department of Urology      Subjective:   55 year old female who presents today for follow up regarding kidney stones. She is s/p left-sided URS/HLL with Dr. Haynes on 5/4/21. She had been admitted on 4/12/21 for urosepsis 2/2 obstructive left ureteral stones x2. Underwent  "urgent stenting with Dr. Haynes and treated with IV antibiotics. Stone composed of 10% struvite and 90% CaP.     Follow up CT A/P obtained on 6/12/21 showed no stones or hydronephrosis. Simple left renal cysts.      Litholink, collected on 6/17, shows hyperoxaluria, high urine pH, hyperuricosuria, and mild CaOx and CaP stone risk. Of note, her urine culture obtained a few days prior to collection grew Klebsiella. Suspect that this significantly impacted her urine chemistry results.     Today, she states that she does not have any obvious symptoms to suggest an ongoing UTI, but states that her urine just smells \"off.\" She otherwise denies issues with urination, including difficulty emptying, incontinence, catheter use, etc.     Objective:     Exam:   Patient is a 55 year old female   General Appearance: Well groomed, hygenic  Respiratory: No cough, no respiratory distress or labored breathing  Musculoskeletal: Grossly normal with no gross deficits  Skin: No discoloration or apparent rashes  Neurologic: No tremors  Psychiatric: Alert and oriented  Further examination is deferred due to the nature of our visit.       "

## 2021-06-28 ENCOUNTER — TELEPHONE (OUTPATIENT)
Dept: UROLOGY | Facility: CLINIC | Age: 56
End: 2021-06-28

## 2021-07-03 NOTE — ANESTHESIA PREPROCEDURE EVALUATION
Anesthesia Preprocedure Evaluation by Giovany Jennings MD at 3/16/2021  9:08 AM     Author: Giovany Jennings MD Service: -- Author Type: Physician    Filed: 3/16/2021  9:13 AM Date of Service: 3/16/2021  9:08 AM Status: Signed    : Giovany Jennings MD (Physician)       Anesthesia Evaluation      Patient summary reviewed   No history of anesthetic complications     Airway   Mallampati: IV  Neck ROM: full   Pulmonary - normal exam    breath sounds clear to auscultation  (+) asthma  mild,  well controlled, sleep apnea on CPAP, , a smoker (36 pk/yrs, quit X 3 years)                         Cardiovascular - normal exam  (+) hypertension, ,     CHF: EF 60-65%  ECG reviewed  Rhythm: regular  Rate: normal,         Neuro/Psych    (+) chronic pain (current INA 5/10, last MSIR yesterday, fentanyl patches due to be changed tomorrow)    Endo/Other    (+) diabetes mellitus type 2, hypothyroidism, obesity (BMI 44),      GI/Hepatic/Renal    (-) GERD     Other findings: chronically on steroids, took this am      Dental                             Anesthesia Plan  Planned anesthetic: general endotracheal and peripheral nerve block  Ketamine for chronic pain, Glidescope intubation, stress dose steroids  ASA 4   Induction: intravenous   Anesthetic plan and risks discussed with: patient  Anesthesia plan special considerations: increased risk of difficult airway, antiemetics,   Post-op plan: routine recovery

## 2021-09-25 ENCOUNTER — HEALTH MAINTENANCE LETTER (OUTPATIENT)
Age: 56
End: 2021-09-25

## 2021-10-01 ENCOUNTER — HOSPITAL ENCOUNTER (EMERGENCY)
Facility: CLINIC | Age: 56
Discharge: HOME | End: 2021-10-01
Admitting: EMERGENCY MEDICINE
Payer: COMMERCIAL

## 2021-10-01 VITALS
HEART RATE: 106 BPM | BODY MASS INDEX: 40.74 KG/M2 | SYSTOLIC BLOOD PRESSURE: 134 MMHG | RESPIRATION RATE: 16 BRPM | TEMPERATURE: 98 F | WEIGHT: 230 LBS | OXYGEN SATURATION: 94 % | DIASTOLIC BLOOD PRESSURE: 94 MMHG

## 2021-10-01 DIAGNOSIS — M54.41 CHRONIC RIGHT-SIDED LOW BACK PAIN WITH RIGHT-SIDED SCIATICA: ICD-10-CM

## 2021-10-01 DIAGNOSIS — G89.29 CHRONIC RIGHT-SIDED LOW BACK PAIN WITH RIGHT-SIDED SCIATICA: ICD-10-CM

## 2021-10-01 PROCEDURE — 250N000013 HC RX MED GY IP 250 OP 250 PS 637: Performed by: EMERGENCY MEDICINE

## 2021-10-01 PROCEDURE — 96372 THER/PROPH/DIAG INJ SC/IM: CPT | Performed by: EMERGENCY MEDICINE

## 2021-10-01 PROCEDURE — 250N000011 HC RX IP 250 OP 636: Performed by: EMERGENCY MEDICINE

## 2021-10-01 PROCEDURE — 99284 EMERGENCY DEPT VISIT MOD MDM: CPT

## 2021-10-01 RX ORDER — METHYLPREDNISOLONE 4 MG
TABLET, DOSE PACK ORAL
Qty: 21 TABLET | Refills: 0 | Status: SHIPPED | OUTPATIENT
Start: 2021-10-01 | End: 2022-11-18

## 2021-10-01 RX ORDER — KETOROLAC TROMETHAMINE 10 MG/1
10 TABLET, FILM COATED ORAL EVERY 6 HOURS PRN
Qty: 12 TABLET | Refills: 0 | Status: SHIPPED | OUTPATIENT
Start: 2021-10-01 | End: 2022-11-18

## 2021-10-01 RX ORDER — OXYCODONE HYDROCHLORIDE 5 MG/1
5 TABLET ORAL ONCE
Status: COMPLETED | OUTPATIENT
Start: 2021-10-01 | End: 2021-10-01

## 2021-10-01 RX ORDER — KETOROLAC TROMETHAMINE 30 MG/ML
60 INJECTION, SOLUTION INTRAMUSCULAR; INTRAVENOUS ONCE
Status: COMPLETED | OUTPATIENT
Start: 2021-10-01 | End: 2021-10-01

## 2021-10-01 RX ADMIN — KETOROLAC TROMETHAMINE 60 MG: 30 INJECTION, SOLUTION INTRAMUSCULAR; INTRAVENOUS at 12:25

## 2021-10-01 RX ADMIN — OXYCODONE HYDROCHLORIDE 5 MG: 5 TABLET ORAL at 12:26

## 2021-10-01 NOTE — ED TRIAGE NOTES
Back pain Rt leg pain since end of August.  Received injection into back on 9/15. Steroids and pain med.  [facility]. Took 2 Oxycodone IR at 0500.  5mg hydrocodone at 0700. Not able to control pain.

## 2021-10-01 NOTE — ED PROVIDER NOTES
EMERGENCY DEPARTMENT ENCOUNTER      NAME: Swathi Gibbons  AGE: 55 year old female  YOB: 1965  MRN: [id number]  EVALUATION DATE & TIME: 10/1/2021 11:51 AM    PCP: Estelle Collins    ED PROVIDER: Olga Murrell PA-C      Chief Complaint   Patient presents with     Back Pain     Leg Pain         FINAL IMPRESSION:  1. Chronic right-sided low back pain with right-sided sciatica          ED COURSE & MEDICAL DECISION MAKING:    Pertinent Labs & Imaging studies reviewed. (See chart for details)    55 year old female presents to the Emergency Department for evaluation of back pain.    Physical exam is remarkable for a generally well-appearing female who is in no acute distress.  She does appear uncomfortable with movements.  Multiple well-healed surgical scars on the low back, no spinal tenderness or step-offs.  Strength is 5 out of 5 in the lower extremities bilaterally.  Abdomen soft and nontender.  Vital signs are stable and she is afebrile.    Patient was given IM Toradol and oral oxycodone with improvement in her pain.  She was able to ambulate safely.  I do not think any further emergent labs or imaging are indicated at this time.  She denies any red flag symptoms like bowel or bladder incontinence, saddle anesthesia, or lower extremity weakness.  She does not have any symptoms of infection or abscess like fevers, nausea or vomiting, or erythema on clinical exam.  Abdominal exam is very reassuring.  I suspect this is for chronic back pain, advised her to follow-up with her pain clinic.  I did place a referral to the [facility] spine center as well.  Patient will be discharged home with prescription for Medrol Dosepak and Toradol.  Discussed use of Toradol in the outpatient setting.  Advised return to the ER if she develops any new or worsening symptoms like severe pain, fever, persistent vomiting, bowel or bladder incontinence, saddle anesthesia, or any other concerning symptoms.  The patient is  amenable with this treatment plan and verbalized her understanding.    ED Course   12:03 PM Performed my initial history and physical exam. Discussed workup in the emergency department, management of symptoms, and likely disposition.   1:13 PM Rechecked and updated patient on her pain and plan for disposition. Patient is agreeable and comfortable with the current plan.  1:15 PM Patient feeling much improved after Toradol injection. Performed ambulation test in the room, which patient tolerated well. She is ready and appropriate for discharge to home. I discussed the plan for discharge with the patient, and patient is agreeable. We discussed supportive cares at home and reasons for return to the ER including new or worsening symptoms - all questions and concerns addressed. Patient to be discharged by RN.    At the conclusion of the encounter I discussed the results of all of the tests and the disposition. The questions were answered. The patient or family acknowledged understanding and was agreeable with the care plan.     Voice recognition software was used in the creation of this note. Any grammatical or nonsensical errors are due to inherent errors with the software and are not the intention of the writer.     MEDICATIONS GIVEN IN THE EMERGENCY:  Medications   ketorolac (TORADOL) injection 60 mg (60 mg Intramuscular Given 10/1/21 1225)   oxyCODONE (ROXICODONE) tablet 5 mg (5 mg Oral Given 10/1/21 1226)       NEW PRESCRIPTIONS STARTED AT TODAY'S ER VISIT  New Prescriptions    KETOROLAC (TORADOL) 10 MG TABLET    Take 1 tablet (10 mg) by mouth every 6 hours as needed for moderate pain    METHYLPREDNISOLONE (MEDROL DOSEPAK) 4 MG TABLET THERAPY PACK    Follow Package Directions            =================================================================    HPI    Patient information was obtained from: Patient    Use of [employer]: N/A      Swathi Gibbons is a 55 year old female with past medical history of alcohol  "abuse, morbid obesity, L5-S1 disc herniation who presents with complaints of mid-low back and right leg pain.    Patient presents with complaints of mid-low back and right leg pain which has been present since the end of August (~1 month). Worse over the past 3 days. Patient reportedly received a steroid injection into her low back on 9/15 from [facility] with minimal relief. Patient with known history of disc herniation at L5-S1 level. She is followed by a pain clinic for chronic pain where she receives Oxycodone IR and Hydrocodone. Has been using these medications with minimal to no relief. Last used Oxycodone IR x2 at 0500 this AM (~7hrs ago) and Hydrocodone 5mg x1 at 0700 this AM (~5hrs ago) again without relief, which prompted her presentation.    Pain is radicular down her RLE, also noting sensory change in her RLE noting it feels like her right leg is \"on fire\". Patient denies saddle paresthesias or any new bladder/bowel incontinence. She denies any recent injuries or falls to exacerbate her pain. It is worse with ambulation, weight bearing, or any sudden movements. Mildly palliated with ice and use of her opioid medications. Patient denies fever, chills, sore throat, chest pain, shortness of breath, nausea, vomiting, abdominal pain, urinary symptoms, or bowel movement changes. Denies any other complaints or concerns at this time.      REVIEW OF SYSTEMS   Constitutional:  No reported fever, chills  Respiratory: No reported SOB, cough  Cardiovascular:  No reported CP  GI:  No reported abdominal pain, nausea, vomiting, dark or bloody stools, or diarrhea   Musculoskeletal:  (+) right leg \"burning pain\". (+) mid-low back pain with radiation to RLE.  Skin:  No reported rash   Neurologic:  No reported headache. (+) focal weakness of RLE secondary to pain with paresthesias.    All other systems reviewed and are negative unless noted in HPI.    PAST MEDICAL HISTORY:  Past Medical History:   Diagnosis " Date     Alcohol use disorder, severe, in sustained remission (H)      Alcoholism (H)      Anxiety and depression      Arthritis     PSORIATIC     Asthma      Back pain      Chronic pain      Chronic rhinitis      Chronic rhinitis      COPD (chronic obstructive pulmonary disease) (H)      Depression      Diabetes (H)     Type II     Esophageal dysmotility      EtOH dependence (H)      Gastroesophageal reflux disease      History of blood clots     superficial blood clot - has seen Heme d/t family hx of DVT's     HTN (hypertension)      Hyperlipidemia      Hypertension      Hypothyroidism      Hypothyroidism      Latent tuberculosis      MRSA (methicillin resistant staph aureus) culture positive      Nodular goiter      NABOR (obstructive sleep apnea)     C-PAP     Other chronic pain     back pain     Psoriasis      Psoriatic arthritis (H)      Secondary hyperparathyroidism (H)      Sleep apnea      Tracheostomy in place (H)      Uncomplicated asthma      Vitamin D deficiency        PAST SURGICAL HISTORY:  Past Surgical History:   Procedure Laterality Date     ANKLE ARTHROPLASTY Left     pins     BACK SURGERY      L4-5 Decompression and Fusion     BLADDER SURGERY       BREAST SURGERY      Right breast biopsy, benign     BUNIONECTOMY Bilateral      C RECONSTR TOTAL SHOULDER IMPLANT Left 3/16/2021    Procedure: LEFT REVERSE TOTAL SHOULDER ARTHROPLASTY;  Surgeon: Han Blandon MD;  Location: [facility] OR;  Service: Orthopedics      SECTION       COMBINED CYSTOSCOPY, INSERT STENT URETER(S) Left 2021    Procedure: CYSTOSCOPY, WITH URETERAL STENT INSERTION;  Surgeon: Luis Antonio Haynes MD;  Location: [facility] OR     DILATION AND CURETTAGE  ,      ENT SURGERY      Tonsillectomy     EYE SURGERY      Lasik     GI SURGERY      Bladder Suspension     GYN SURGERY       Section     GYN SURGERY      Tubal Ligation     INSERT STIMULATOR DORSAL COLUMN N/A 2019    Procedure: INSERTION,  SPINAL CORD STIMULATOR, DORSAL COLUMN (MEDTRONIC);  Surgeon: Reginald Pandey MD;  Location: [facility] OR     INSERT STIMULATOR DORSAL COLUMN TRIAL N/A 4/4/2019    Procedure: SPINAL CORD STIMULATOR  TRIAL;  Surgeon: Reginald Pandey MD;  Location: [facility] OR     LASER HOLMIUM LITHOTRIPSY URETER(S), INSERT STENT, COMBINED Left 5/4/2021    Procedure: CYSTOURETEROSCOPY, WITH  RETROGRADE PYELOGRAMS, INTERPRETATION OF FLUOROSCOPIC IMAGES, STONE BASKET EXTRACTION, LITHOTRIPSY USING LASER AND LEFT URETERAL STENT REMOVAL AND INSERTION;  Surgeon: Luis Antonio Haynes MD;  Location: [facility] OR     ORTHOPEDIC SURGERY      ORIF Ankle Left 2017     ORTHOPEDIC SURGERY      Bunionectomy Nolan.     OTHER SURGICAL HISTORY      transvaginal       CURRENT MEDICATIONS:    ketorolac (TORADOL) 10 MG tablet  methylPREDNISolone (MEDROL DOSEPAK) 4 MG tablet therapy pack  acetaminophen (TYLENOL) 325 MG tablet  albuterol (PROAIR HFA/PROVENTIL HFA/VENTOLIN HFA) 108 (90 Base) MCG/ACT inhaler  buPROPion (WELLBUTRIN XL) 300 MG 24 hr tablet  calcitonin, salmon, (MIACALCIN) 200 UNIT/ACT nasal spray  cycloSPORINE (RESTASIS) 0.05 % ophthalmic emulsion  diltiazem (CARDIZEM) 60 MG tablet  doxycycline hyclate (VIBRAMYCIN) 50 MG capsule  fentaNYL (DURAGESIC) 12 mcg/hr 72 hr patch  fentaNYL (DURAGESIC) 25 mcg/hr 72 hr patch  ferrous sulfate (FEROSUL) 325 (65 Fe) MG tablet  fish oil-omega-3 fatty acids 1000 MG capsule  fluconazole (DIFLUCAN) 200 MG tablet  fluticasone (FLONASE) 50 MCG/ACT nasal spray  fluticasone-salmeterol (ADVAIR) 100-50 MCG/DOSE inhaler  gabapentin (NEURONTIN) 300 MG capsule  imiquimod (ALDARA) 5 % external cream  leflunomide (ARAVA) 20 MG tablet  levothyroxine (SYNTHROID/LEVOTHROID) 75 MCG tablet  Lidocaine (LIDOCARE) 4 % Patch  lidocaine-prilocaine (EMLA) 2.5-2.5 % external cream  lurasidone (LATUDA) 20 MG TABS tablet  metFORMIN (GLUCOPHAGE-XR) 500 MG 24 hr tablet  methylPREDNISolone (MEDROL) 4 MG tablet  modafinil (PROVIGIL) 200 MG  tablet  multivitamin, therapeutic (THERA-VIT) TABS tablet  naloxone (NARCAN) 4 MG/0.1ML nasal spray  NONFORMULARY  nystatin (MYCOSTATIN) 279435 UNIT/GM external cream  nystatin (MYCOSTATIN) 790007 UNIT/GM external powder  omeprazole (PRILOSEC) 20 MG DR capsule  polyethylene glycol (MIRALAX) 17 g packet  polyvinyl alcohol (LIQUIFILM TEARS) 1.4 % ophthalmic solution  pravastatin (PRAVACHOL) 80 MG tablet  secukinumab (COSENTYX) 150 MG/ML Sensoready pen  senna-docusate (SENOKOT-S/PERICOLACE) 8.6-50 MG tablet  traZODone (DESYREL) 100 MG tablet  varenicline (CHANTIX) 1 MG tablet  venlafaxine (EFFEXOR-XR) 150 MG 24 hr capsule  vitamin D2 (ERGOCALCIFEROL) 74159 units (1250 mcg) capsule  Xylitol 500 MG DISK        ALLERGIES:  Allergies   Allergen Reactions     Avelox [Moxifloxacin] Anaphylaxis     Requiring epinephrine     Cefaclor Hives     Tolerated cephalexin, tolerated cefepime     Sulfa Drugs Hives       FAMILY HISTORY:  Family History   Problem Relation Age of Onset     Cancer Father      No Known Problems Mother        SOCIAL HISTORY:   Social History     Socioeconomic History     Marital status:      Spouse name: Not on file     Number of children: Not on file     Years of education: Not on file     Highest education level: Not on file   Occupational History     Not on file   Tobacco Use     Smoking status: Former Smoker     Packs/day: 0.50     Types: Cigarettes     Quit date: 2/5/2018     Years since quitting: 3.6     Smokeless tobacco: Never Used   Substance and Sexual Activity     Alcohol use: Not Currently     Comment: sober for 6 years     Drug use: No     Sexual activity: Not on file   Other Topics Concern     Parent/sibling w/ CABG, MI or angioplasty before 65F 55M? Not Asked   Social History Narrative     Not on file     Social Determinants of Health     Financial Resource Strain:      Difficulty of Paying Living Expenses:    Food Insecurity:      Worried About Running Out of Food in the Last Year:       Ran Out of Food in the Last Year:    Transportation Needs:      Lack of Transportation (Medical):      Lack of Transportation (Non-Medical):    Physical Activity:      Days of Exercise per Week:      Minutes of Exercise per Session:    Stress:      Feeling of Stress :    Social Connections:      Frequency of Communication with Friends and Family:      Frequency of Social Gatherings with Friends and Family:      Attends Hoahaoism Services:      Active Member of Clubs or Organizations:      Attends Club or Organization Meetings:      Marital Status:    Intimate Partner Violence:      Fear of Current or Ex-Partner:      Emotionally Abused:      Physically Abused:      Sexually Abused:        VITALS:  Patient Vitals for the past 24 hrs:   BP Temp Pulse Resp SpO2 Weight   10/01/21 1113 (!) 134/94 98  F (36.7  C) 106 16 94 % 104.3 kg (230 lb)       PHYSICAL EXAM    VITAL SIGNS: BP (!) 134/94   Pulse 106   Temp 98  F (36.7  C)   Resp 16   Wt 104.3 kg (230 lb)   SpO2 94%   BMI 40.74 kg/m    General Appearance: Uncomfortable with movements; Alert, cooperative, normal speech and facial symmetry, appears stated age, the patient does not appear in distress  Head:  Normocephalic, without obvious abnormality, atraumatic  Neck:  Supple, symmetrical, trachea midline, no midline tenderness or stepoffs  Back:  Symmetric, no curvature, ROM normal, no CVA tenderness, no spinal tenderness; well healed surgical scars on bilateral low back and at midline  Abdomen:  Abdomen is soft, non-distended with no tenderness to palpation, rebound tenderness, or guarding.   Extremities:  Extremities normal, there is no tenderness to palpation , atraumatic, no cyanosis or edema, full function and range of motion, pulses equal in all extremities, normal cap refill, no joint swelling; strength is 5/5 in the lower extremities bilaterally  Neuro: Patient is awake, alert, and responsive to voice. No gross motor weaknesses or sensory loss; moves  all extremities.     LAB:  All pertinent labs reviewed and interpreted.  Labs Ordered and Resulted from Time of ED Arrival Up to the Time of Departure from the ED - No data to display    RADIOLOGY:  Reviewed all pertinent imaging. Please see official radiology report.  No orders to display       I, Yoni Gutiérrez, am serving as a scribe to document services personally performed by Olga Murrell PA-C based on my observation and the provider's statements to me. I, Olga Murrell PA-C attest that Yoni Gutiérrez is acting in a scribe capacity, has observed my performance of the services and has documented them in accordance with my direction.     Olga Murrell PA-C  Emergency Medicine  [facility] EMERGENCY ROOM  [address] [facility] [address] [facility] [address]  [phone]  Dept: [phone]     Olga Murrell PA-C  10/01/21 1324

## 2021-10-01 NOTE — DISCHARGE INSTRUCTIONS
You were seen in the emergency department today for evaluation of back pain.    I will send prescriptions for steroid Dosepak and Toradol to your pharmacy.  Take Toradol with food to prevent upset stomach, do not take Toradol along with other NSAIDs like Aleve/aspirin/ibuprofen.  Continue taking your normal pain medications as prescribed.    I placed a referral to the spine center, please call them to schedule an appointment.  Return to the ER if you develop any new or worsening symptoms like severe pain, peeing or pooping in your pants, inability to walk, fever, or any other concerning symptoms.

## 2021-11-09 ENCOUNTER — HOSPITAL ENCOUNTER (EMERGENCY)
Facility: HOSPITAL | Age: 56
Discharge: HOME | End: 2021-11-09
Attending: EMERGENCY MEDICINE | Admitting: EMERGENCY MEDICINE
Payer: COMMERCIAL

## 2021-11-09 VITALS
HEART RATE: 94 BPM | SYSTOLIC BLOOD PRESSURE: 162 MMHG | OXYGEN SATURATION: 90 % | TEMPERATURE: 98.8 F | DIASTOLIC BLOOD PRESSURE: 82 MMHG | RESPIRATION RATE: 18 BRPM | WEIGHT: 240 LBS | BODY MASS INDEX: 42.51 KG/M2

## 2021-11-09 DIAGNOSIS — G89.29 CHRONIC BILATERAL LOW BACK PAIN WITH BILATERAL SCIATICA: ICD-10-CM

## 2021-11-09 DIAGNOSIS — M54.41 CHRONIC BILATERAL LOW BACK PAIN WITH BILATERAL SCIATICA: ICD-10-CM

## 2021-11-09 DIAGNOSIS — M54.42 CHRONIC BILATERAL LOW BACK PAIN WITH BILATERAL SCIATICA: ICD-10-CM

## 2021-11-09 PROCEDURE — 250N000013 HC RX MED GY IP 250 OP 250 PS 637: Performed by: EMERGENCY MEDICINE

## 2021-11-09 PROCEDURE — 99283 EMERGENCY DEPT VISIT LOW MDM: CPT

## 2021-11-09 RX ORDER — ACETAMINOPHEN 325 MG/1
975 TABLET ORAL ONCE
Status: COMPLETED | OUTPATIENT
Start: 2021-11-09 | End: 2021-11-09

## 2021-11-09 RX ORDER — CYCLOBENZAPRINE HCL 10 MG
10 TABLET ORAL 3 TIMES DAILY PRN
Qty: 18 TABLET | Refills: 0 | Status: SHIPPED | OUTPATIENT
Start: 2021-11-09 | End: 2021-11-15

## 2021-11-09 RX ORDER — CYCLOBENZAPRINE HCL 10 MG
10 TABLET ORAL ONCE
Status: COMPLETED | OUTPATIENT
Start: 2021-11-09 | End: 2021-11-09

## 2021-11-09 RX ORDER — LIDOCAINE 4 G/G
2 PATCH TOPICAL
Status: DISCONTINUED | OUTPATIENT
Start: 2021-11-09 | End: 2021-11-10 | Stop reason: HOSPADM

## 2021-11-09 RX ORDER — OXYCODONE HYDROCHLORIDE 5 MG/1
5 TABLET ORAL ONCE
Status: COMPLETED | OUTPATIENT
Start: 2021-11-09 | End: 2021-11-09

## 2021-11-09 RX ORDER — LIDOCAINE 50 MG/G
1 PATCH TOPICAL EVERY 24 HOURS
Qty: 10 PATCH | Refills: 0 | Status: SHIPPED | OUTPATIENT
Start: 2021-11-09 | End: 2021-11-19

## 2021-11-09 RX ADMIN — CYCLOBENZAPRINE 10 MG: 10 TABLET, FILM COATED ORAL at 21:12

## 2021-11-09 RX ADMIN — OXYCODONE HYDROCHLORIDE 5 MG: 5 TABLET ORAL at 21:12

## 2021-11-09 RX ADMIN — LIDOCAINE 2 PATCH: 246 PATCH TOPICAL at 21:15

## 2021-11-09 RX ADMIN — ACETAMINOPHEN 975 MG: 325 TABLET ORAL at 21:12

## 2021-11-10 NOTE — ED NOTES
Bed: JNED-  Expected date: 11/9/21  Expected time: 8:13 PM  Means of arrival: Ambulance  Comments:  [facility]  57yo F back pain

## 2021-11-10 NOTE — ED TRIAGE NOTES
Arrival EMS from home has worsening lower back pain radiating to butt and bilateral legs. Has has hx of back surgery x2. Has fentanyl patches in place. Received 75mcg fentanyl IV by EMS.

## 2021-11-10 NOTE — ED PROVIDER NOTES
EMERGENCY DEPARTMENT ENCOUNTER      NAME: Swathi Gibbons  AGE: 56 year old female  YOB: 1965  MRN: [id number]  EVALUATION DATE & TIME: 11/9/2021  8:27 PM    PCP: Estelle Collins    ED PROVIDER: Yaya Godwin M.D.      Chief Complaint   Patient presents with     Back Pain         FINAL IMPRESSION:  1. Chronic bilateral low back pain with bilateral sciatica          ED COURSE & MEDICAL DECISION MAKING:    Pertinent Labs & Imaging studies reviewed. (See chart for details)  9:01 PM I met with patient for initial interview and encounter. PPE worn includes N95 mask and goggles.  10:07 PM Repeat exam is benign.  Patient states that she feels improved.    56 year old female presents to the Emergency Department for evaluation of low back pain. Patient appears non toxic with stable vitals signs, patient is afebrile with no hypoxia, tachycardia. Overall exam is benign.  Back exam benign with no midline tenderness, no step-offs or signs of trauma, she is neurovascular intact with no concerning red flags, denies any bowel or bladder incontinence, saddle anesthesia, recent surgical intervention, fever or night sweats.  Clinically, given history and exam noted, nothing to suggest cauda equina syndrome, epidural bleed or abscess, vertebral fracture or dislocation, discitis, osteomyelitis, or other spinal cord compromise.  Again she denies any falls or trauma.  No indication for emergent labs or imaging studies.  Patient is followed by a neurosurgeon and pain clinic.  Here she was given a dose of Tylenol, oxycodone, Flexeril and Lidoderm patch.  Following these interventions repeat exam was benign the patient states that she felt improved and she looks much more comfortable.  With improvement feel she is safe for discharge, will discharge with Flexeril and Lidoderm patches, recommend that she follows up with her primary pain clinic and neurosurgeon in the next 24 hours to discuss ongoing outpatient management of her  low back pain.  Instructed the patient not to take Flexeril if she is taking other muscle relaxers and did warn her about possible drowsiness effects.  Discussed all these recommendations with the patient felt reassured and comfortable with discharge.  Return precautions provided.  Did provide contact information for our spine care center as needed for additional resources.      At the conclusion of the encounter I discussed the results of all of the tests and the disposition. The questions were answered and return precautions provided. The patient or family acknowledged understanding and was agreeable with the care plan.         MEDICATIONS GIVEN IN THE EMERGENCY:  Medications   Lidocaine (LIDOCARE) 4 % Patch 2 patch (2 patches Transdermal Patch/Med Applied 11/9/21 2115)     And   lidocaine patch in PLACE ( Transdermal Patch in Place 11/9/21 2116)   acetaminophen (TYLENOL) tablet 975 mg (975 mg Oral Given 11/9/21 2112)   cyclobenzaprine (FLEXERIL) tablet 10 mg (10 mg Oral Given 11/9/21 2112)   oxyCODONE (ROXICODONE) tablet 5 mg (5 mg Oral Given 11/9/21 2112)       NEW PRESCRIPTIONS STARTED AT TODAY'S ER VISIT  New Prescriptions    CYCLOBENZAPRINE (FLEXERIL) 10 MG TABLET    Take 1 tablet (10 mg) by mouth 3 times daily as needed for muscle spasms    LIDOCAINE (LIDODERM) 5 % PATCH    Place 1 patch onto the skin every 24 hours for 10 days            =================================================================    HPI    Patient information was obtained from: Patient     Use of Intrepreter: N/A        Swathi Gibbons is a 56 year old female who presents via EMS for evaluation of back pain.    Patient with chronic back pain s/p 2 back surgeries (2018 and 2020) reports she has had worsening lower back pain which radiates to her buttocks and bilateral legs over the past few days. She states this pain began at the end of August with radiation only down her right leg, but she is now having diffuse lower back aches with  sharp radiation down her bilateral legs. She states that this pain is worse with walking or weight bearing. Patient was supposed to have a clinic visit for this today but she states she was unable to walk due to the pain. Patient had CT done in October and has follow up MRI scheduled for 11/18. Patient has been receiving steroid injections from [facility] with minimal relief. At home, patient has been taking Advil and hydrocodone.    Per EMS, patient received 75 mcg IV fentanyl and has 2 fentanyl patches in place which brought her pain from 10/10 to 5/10.    Patient was seen in this ED on 10/1 for this pain and given IM Toradol which relieved her pain. She states however that her spine doctor did not want her having Toradol due to risk for renal failure.     Patient also notes that her baseline urinary incontinence seems to have increased recently as well.    Patient denies any new fever, chest pain, cough, numbness, or any other complaints.    Patient occasionally uses tobacco and denies any alcohol or drugs.      REVIEW OF SYSTEMS   Constitutional:  Denies fever, chills  Respiratory:  Denies productive cough or increased work of breathing  Cardiovascular:  Denies chest pain, palpitations  GI:  Denies abdominal pain, nausea, vomiting, or change in bowel or bladder habits   Musculoskeletal:  Positive for lower back pain with radiation to bilateral legs, Denies any new muscle/joint swelling  Skin:  Denies rash   Neurologic:  Positive for increased urinary incontinence, Denies focal weakness  All systems negative except as marked.     PAST MEDICAL HISTORY:  Past Medical History:   Diagnosis Date     Alcohol use disorder, severe, in sustained remission (H)      Alcoholism (H)      Anxiety and depression      Arthritis     PSORIATIC     Asthma      Back pain      Chronic pain      Chronic rhinitis      Chronic rhinitis      COPD (chronic obstructive pulmonary disease) (H)      Depression      Diabetes  (H)     Type II     Esophageal dysmotility      EtOH dependence (H)      Gastroesophageal reflux disease      History of blood clots     superficial blood clot - has seen Heme d/t family hx of DVT's     HTN (hypertension)      Hyperlipidemia      Hypertension      Hypothyroidism      Hypothyroidism      Latent tuberculosis      MRSA (methicillin resistant staph aureus) culture positive      Nodular goiter      NABOR (obstructive sleep apnea)     C-PAP     Other chronic pain     back pain     Psoriasis      Psoriatic arthritis (H)      Secondary hyperparathyroidism (H)      Sleep apnea      Tracheostomy in place (H)      Uncomplicated asthma      Vitamin D deficiency        PAST SURGICAL HISTORY:  Past Surgical History:   Procedure Laterality Date     ANKLE ARTHROPLASTY Left     pins     BACK SURGERY      L4-5 Decompression and Fusion     BLADDER SURGERY       BREAST SURGERY      Right breast biopsy, benign     BUNIONECTOMY Bilateral      C RECONSTR TOTAL SHOULDER IMPLANT Left 3/16/2021    Procedure: LEFT REVERSE TOTAL SHOULDER ARTHROPLASTY;  Surgeon: Han Blandon MD;  Location: [facility];  Service: Orthopedics      SECTION       COMBINED CYSTOSCOPY, INSERT STENT URETER(S) Left 2021    Procedure: CYSTOSCOPY, WITH URETERAL STENT INSERTION;  Surgeon: Luis Antonio Haynes MD;  Location: [facility] OR     DILATION AND CURETTAGE  ,      ENT SURGERY      Tonsillectomy     EYE SURGERY      Lasik     GI SURGERY      Bladder Suspension     GYN SURGERY       Section     GYN SURGERY      Tubal Ligation     INSERT STIMULATOR DORSAL COLUMN N/A 2019    Procedure: INSERTION, SPINAL CORD STIMULATOR, DORSAL COLUMN (MEDTRONIC);  Surgeon: Reginald Pandey MD;  Location:  OR     INSERT STIMULATOR DORSAL COLUMN TRIAL N/A 2019    Procedure: SPINAL CORD STIMULATOR  TRIAL;  Surgeon: Reginald Pandey MD;  Location: [facility] OR     LASER HOLMIUM LITHOTRIPSY URETER(S), INSERT STENT, COMBINED Left  5/4/2021    Procedure: CYSTOURETEROSCOPY, WITH  RETROGRADE PYELOGRAMS, INTERPRETATION OF FLUOROSCOPIC IMAGES, STONE BASKET EXTRACTION, LITHOTRIPSY USING LASER AND LEFT URETERAL STENT REMOVAL AND INSERTION;  Surgeon: Luis Antonio Haynes MD;  Location: [facility] OR     ORTHOPEDIC SURGERY      ORIF Ankle Left 2017     ORTHOPEDIC SURGERY      Bunionectomy Nolan.     OTHER SURGICAL HISTORY      transvaginal         CURRENT MEDICATIONS:    Prior to Admission medications    Medication Sig Start Date End Date Taking? Authorizing Provider   acetaminophen (TYLENOL) 325 MG tablet Take 325-650 mg by mouth every 4 hours as needed for mild pain    Unknown, Entered By History   albuterol (PROAIR HFA/PROVENTIL HFA/VENTOLIN HFA) 108 (90 Base) MCG/ACT inhaler Inhale 2 puffs into the lungs every 4 hours as needed for shortness of breath / dyspnea or wheezing (VENTOLIN)    Reported, Patient   buPROPion (WELLBUTRIN XL) 300 MG 24 hr tablet Take 300 mg by mouth every morning     Reported, Patient   calcitonin, salmon, (MIACALCIN) 200 UNIT/ACT nasal spray Spray 1 spray into one nostril alternating nostrils daily Alternate nostril each day.    Unknown, Entered By History   cycloSPORINE (RESTASIS) 0.05 % ophthalmic emulsion Place 1 drop into both eyes 2 times daily    Reported, Patient   diltiazem (CARDIZEM) 60 MG tablet Take 60 mg by mouth 3 times daily    Reported, Patient   doxycycline hyclate (VIBRAMYCIN) 50 MG capsule Take 50 mg by mouth every evening    Unknown, Entered By History   fentaNYL (DURAGESIC) 12 mcg/hr 72 hr patch Place 1 patch onto the skin every 72 hours remove old patch.    Unknown, Entered By History   fentaNYL (DURAGESIC) 25 mcg/hr 72 hr patch Place 1 patch onto the skin every 72 hours remove old patch.    Unknown, Entered By History   ferrous sulfate (FEROSUL) 325 (65 Fe) MG tablet Take 325 mg by mouth daily (with breakfast)    Unknown, Entered By History   fish oil-omega-3 fatty acids 1000 MG capsule Take 2 g by mouth 2 times  daily    Unknown, Entered By History   fluconazole (DIFLUCAN) 200 MG tablet Take 200 mg by mouth daily as needed    Unknown, Entered By History   fluticasone (FLONASE) 50 MCG/ACT nasal spray Spray 2 sprays into both nostrils daily     Reported, Patient   fluticasone-salmeterol (ADVAIR) 100-50 MCG/DOSE inhaler Inhale 1 puff into the lungs every 12 hours    Unknown, Entered By History   gabapentin (NEURONTIN) 300 MG capsule Take 900 mg by mouth 3 times daily (300MG X 3 = 900MG)    Reported, Patient   imiquimod (ALDARA) 5 % external cream Apply topically nightly as needed    Unknown, Entered By History   ketorolac (TORADOL) 10 MG tablet Take 1 tablet (10 mg) by mouth every 6 hours as needed for moderate pain 10/1/21   Olga Murrell PA-C   leflunomide (ARAVA) 20 MG tablet Take 20 mg by mouth every evening     Reported, Patient   levothyroxine (SYNTHROID/LEVOTHROID) 75 MCG tablet Take 75 mcg by mouth daily    Reported, Patient   Lidocaine (LIDOCARE) 4 % Patch Place 2 patches onto the skin every 12 hours as needed for moderate pain    Reported, Patient   lidocaine-prilocaine (EMLA) 2.5-2.5 % external cream Apply topically as needed for moderate pain    Unknown, Entered By History   lurasidone (LATUDA) 20 MG TABS tablet Take 20 mg by mouth At Bedtime     Unknown, Entered By History   metFORMIN (GLUCOPHAGE-XR) 500 MG 24 hr tablet Take 500 mg by mouth daily (with breakfast) (500MG X 2 = 1,000MG)    Reported, Patient   methylPREDNISolone (MEDROL DOSEPAK) 4 MG tablet therapy pack Follow Package Directions 10/1/21   Olga Murrell PA-C   methylPREDNISolone (MEDROL) 4 MG tablet Take 8 mg by mouth daily     Reported, Patient   modafinil (PROVIGIL) 200 MG tablet Take 200 mg by mouth daily as needed     Reported, Patient   multivitamin, therapeutic (THERA-VIT) TABS tablet Take 1 tablet by mouth daily    Reported, Patient   naloxone (NARCAN) 4 MG/0.1ML nasal spray Spray 4 mg into one nostril alternating nostrils once as  needed for opioid reversal every 2-3 minutes until assistance arrives    Reported, Patient   NONFORMULARY Take 500 mg by mouth daily as needed Rajat leaf extract    Unknown, Entered By History   nystatin (MYCOSTATIN) 846125 UNIT/GM external cream Apply topically 2 times daily as needed for dry skin     Reported, Patient   nystatin (MYCOSTATIN) 575068 UNIT/GM external powder Apply topically 4 times daily as needed Under abdominal wall     Reported, Patient   omeprazole (PRILOSEC) 20 MG DR capsule Take 20 mg by mouth 2 times daily     Reported, Patient   polyethylene glycol (MIRALAX) 17 g packet Take 1 packet by mouth daily as needed     Unknown, Entered By History   polyvinyl alcohol (LIQUIFILM TEARS) 1.4 % ophthalmic solution Place 1 drop into both eyes 3 times daily as needed for dry eyes    Unknown, Entered By History   pravastatin (PRAVACHOL) 80 MG tablet Take 80 mg by mouth At Bedtime    Unknown, Entered By History   secukinumab (COSENTYX) 150 MG/ML Sensoready pen Inject 300 mg Subcutaneous every 28 days    Reported, Patient   senna-docusate (SENOKOT-S/PERICOLACE) 8.6-50 MG tablet Take 1-2 tablets by mouth 2 times daily    Unknown, Entered By History   traZODone (DESYREL) 100 MG tablet Take 100 mg by mouth At Bedtime     Reported, Patient   varenicline (CHANTIX) 1 MG tablet Take 1 mg by mouth 2 times daily    Reported, Patient   venlafaxine (EFFEXOR-XR) 150 MG 24 hr capsule Take 150 mg by mouth daily    Unknown, Entered By History   vitamin D2 (ERGOCALCIFEROL) 20553 units (1250 mcg) capsule Take 50,000 Units by mouth four times a week     Reported, Patient   Xylitol 500 MG DISK Take 1 Dose by mouth every evening as needed    Reported, Patient        ALLERGIES:  Allergies   Allergen Reactions     Avelox [Moxifloxacin] Anaphylaxis     Requiring epinephrine     Cefaclor Hives     Tolerated cephalexin, tolerated cefepime     Sulfa Drugs Hives       FAMILY HISTORY:  Family History   Problem Relation Age of Onset      Cancer Father      No Known Problems Mother        SOCIAL HISTORY:   Social History     Socioeconomic History     Marital status:      Spouse name: Not on file     Number of children: Not on file     Years of education: Not on file     Highest education level: Not on file   Occupational History     Not on file   Tobacco Use     Smoking status: Former Smoker     Packs/day: 0.50     Types: Cigarettes     Quit date: 2/5/2018     Years since quitting: 3.7     Smokeless tobacco: Never Used   Substance and Sexual Activity     Alcohol use: Not Currently     Comment: sober for 6 years     Drug use: No     Sexual activity: Not on file   Other Topics Concern     Parent/sibling w/ CABG, MI or angioplasty before 65F 55M? Not Asked   Social History Narrative     Not on file     Social Determinants of Health     Financial Resource Strain: Not on file   Food Insecurity: Not on file   Transportation Needs: Not on file   Physical Activity: Not on file   Stress: Not on file   Social Connections: Not on file   Intimate Partner Violence: Not on file   Housing Stability: Not on file       VITALS:  Patient Vitals for the past 24 hrs:   BP Temp Temp src Pulse Resp SpO2 Weight   11/09/21 2215 (!) 162/82 -- -- 94 -- 90 % --   11/09/21 2200 (!) 163/73 -- -- 85 -- 92 % --   11/09/21 2145 (!) 158/98 -- -- 96 -- 92 % --   11/09/21 2130 (!) 144/87 -- -- 93 -- 91 % --   11/09/21 2115 139/83 -- -- 92 -- 91 % --   11/09/21 2100 (!) 161/77 -- -- 92 -- 95 % --   11/09/21 2036 (!) 165/90 98.8  F (37.1  C) Oral 92 18 95 % 108.9 kg (240 lb)   11/09/21 2030 (!) 165/90 -- -- 92 -- 94 % --        PHYSICAL EXAM    Constitutional:  Awake, alert, in no apparent distress  HENT:  Normocephalic, Atraumatic. Bilateral external ears normal. Oropharynx moist. Nose normal. Neck- Normal range of motion with no guarding, No midline cervical tenderness, Supple, No stridor.   Eyes:  PERRL, EOMI with no signs of entrapment, Conjunctiva normal, No discharge.    Respiratory:  Normal breath sounds, No respiratory distress, No wheezing.    Cardiovascular:  Normal heart rate, Normal rhythm, No appreciable rubs or gallops.   GI:  Soft, No tenderness, No distension, No palpable masses  Musculoskeletal:  Intact distal pulses, No edema. Good range of motion in all major joints. No tenderness to palpation or major deformities noted.  Back-nontender along midline cervical, thoracic and lumbar spine with no step-offs or signs of trauma, chronic appearing surgical scars over lumbar spine appears clean and well-healed.  Focal tenderness over the left paraspinal lumbar muscles and buttock, negative straight leg testing bilaterally  Integument:  Warm, Dry, No erythema, No rash or vesicular lesions  Neurologic:  Alert & oriented, Normal motor function, Normal sensory function, No focal deficits noted.   Psychiatric:  Affect normal, Judgment normal, Mood normal.     LAB:  All pertinent labs reviewed and interpreted.       RADIOLOGY:  No orders to display          IMyke, am serving as a scribe to document services personally performed by Yaya Godwin MD, based on my observation and the provider's statements to me. I, Yaya Godwin MD attest that Myke Clay is acting in a scribe capacity, has observed my performance of the services and has documented them in accordance with my direction.    Yaya Godwin M.D.  Emergency Medicine  [facility] EMERGENCY DEPARTMENT  [facility] [facility] [address]  [phone]  Dept: [phone]     Yaya Godwin MD  11/09/21 8048

## 2021-11-20 ENCOUNTER — HOSPITAL ENCOUNTER (EMERGENCY)
Facility: CLINIC | Age: 56
Discharge: HOME | End: 2021-11-20
Attending: EMERGENCY MEDICINE | Admitting: EMERGENCY MEDICINE
Payer: COMMERCIAL

## 2021-11-20 ENCOUNTER — APPOINTMENT (OUTPATIENT)
Dept: CT IMAGING | Facility: CLINIC | Age: 56
End: 2021-11-20
Attending: EMERGENCY MEDICINE
Payer: COMMERCIAL

## 2021-11-20 VITALS
OXYGEN SATURATION: 96 % | TEMPERATURE: 98.3 F | SYSTOLIC BLOOD PRESSURE: 135 MMHG | RESPIRATION RATE: 18 BRPM | DIASTOLIC BLOOD PRESSURE: 89 MMHG | HEART RATE: 83 BPM

## 2021-11-20 VITALS
WEIGHT: 245.8 LBS | OXYGEN SATURATION: 95 % | TEMPERATURE: 97 F | DIASTOLIC BLOOD PRESSURE: 87 MMHG | BODY MASS INDEX: 43.54 KG/M2 | RESPIRATION RATE: 18 BRPM | HEART RATE: 91 BPM | SYSTOLIC BLOOD PRESSURE: 137 MMHG

## 2021-11-20 DIAGNOSIS — R93.7 ABNORMAL CT SCAN, LUMBAR SPINE: ICD-10-CM

## 2021-11-20 DIAGNOSIS — M54.9 BACK PAIN, UNSPECIFIED BACK LOCATION, UNSPECIFIED BACK PAIN LATERALITY, UNSPECIFIED CHRONICITY: ICD-10-CM

## 2021-11-20 DIAGNOSIS — M54.42 CHRONIC MIDLINE LOW BACK PAIN WITH BILATERAL SCIATICA: ICD-10-CM

## 2021-11-20 DIAGNOSIS — G89.29 CHRONIC MIDLINE LOW BACK PAIN WITH BILATERAL SCIATICA: ICD-10-CM

## 2021-11-20 DIAGNOSIS — M54.41 CHRONIC MIDLINE LOW BACK PAIN WITH BILATERAL SCIATICA: ICD-10-CM

## 2021-11-20 LAB
ANION GAP SERPL CALCULATED.3IONS-SCNC: 2 MMOL/L (ref 3–14)
BASOPHILS # BLD AUTO: 0 10E3/UL (ref 0–0.2)
BASOPHILS NFR BLD AUTO: 0 %
BUN SERPL-MCNC: 11 MG/DL (ref 7–30)
CALCIUM SERPL-MCNC: 8.7 MG/DL (ref 8.5–10.1)
CHLORIDE BLD-SCNC: 104 MMOL/L (ref 94–109)
CO2 SERPL-SCNC: 32 MMOL/L (ref 20–32)
CREAT SERPL-MCNC: 0.56 MG/DL (ref 0.52–1.04)
EOSINOPHIL # BLD AUTO: 0 10E3/UL (ref 0–0.7)
EOSINOPHIL NFR BLD AUTO: 0 %
ERYTHROCYTE [DISTWIDTH] IN BLOOD BY AUTOMATED COUNT: 15.2 % (ref 10–15)
GFR SERPL CREATININE-BSD FRML MDRD: >90 ML/MIN/1.73M2
GLUCOSE BLD-MCNC: 92 MG/DL (ref 70–99)
HCT VFR BLD AUTO: 35.3 % (ref 35–47)
HGB BLD-MCNC: 10.8 G/DL (ref 11.7–15.7)
IMM GRANULOCYTES # BLD: 0.1 10E3/UL
IMM GRANULOCYTES NFR BLD: 1 %
LYMPHOCYTES # BLD AUTO: 1.7 10E3/UL (ref 0.8–5.3)
LYMPHOCYTES NFR BLD AUTO: 20 %
MCH RBC QN AUTO: 29.4 PG (ref 26.5–33)
MCHC RBC AUTO-ENTMCNC: 30.6 G/DL (ref 31.5–36.5)
MCV RBC AUTO: 96 FL (ref 78–100)
MONOCYTES # BLD AUTO: 0.7 10E3/UL (ref 0–1.3)
MONOCYTES NFR BLD AUTO: 8 %
NEUTROPHILS # BLD AUTO: 5.9 10E3/UL (ref 1.6–8.3)
NEUTROPHILS NFR BLD AUTO: 71 %
NRBC # BLD AUTO: 0 10E3/UL
NRBC BLD AUTO-RTO: 0 /100
PLATELET # BLD AUTO: 214 10E3/UL (ref 150–450)
POTASSIUM BLD-SCNC: 4.1 MMOL/L (ref 3.4–5.3)
RBC # BLD AUTO: 3.67 10E6/UL (ref 3.8–5.2)
SODIUM SERPL-SCNC: 138 MMOL/L (ref 133–144)
WBC # BLD AUTO: 8.4 10E3/UL (ref 4–11)

## 2021-11-20 PROCEDURE — 250N000011 HC RX IP 250 OP 636: Performed by: EMERGENCY MEDICINE

## 2021-11-20 PROCEDURE — 99284 EMERGENCY DEPT VISIT MOD MDM: CPT | Mod: 25

## 2021-11-20 PROCEDURE — 80048 BASIC METABOLIC PNL TOTAL CA: CPT | Performed by: EMERGENCY MEDICINE

## 2021-11-20 PROCEDURE — 250N000013 HC RX MED GY IP 250 OP 250 PS 637: Performed by: EMERGENCY MEDICINE

## 2021-11-20 PROCEDURE — 36415 COLL VENOUS BLD VENIPUNCTURE: CPT | Performed by: EMERGENCY MEDICINE

## 2021-11-20 PROCEDURE — 96374 THER/PROPH/DIAG INJ IV PUSH: CPT

## 2021-11-20 PROCEDURE — 96372 THER/PROPH/DIAG INJ SC/IM: CPT | Mod: 59 | Performed by: EMERGENCY MEDICINE

## 2021-11-20 PROCEDURE — 85025 COMPLETE CBC W/AUTO DIFF WBC: CPT | Performed by: EMERGENCY MEDICINE

## 2021-11-20 PROCEDURE — 99285 EMERGENCY DEPT VISIT HI MDM: CPT | Mod: 25

## 2021-11-20 PROCEDURE — 72131 CT LUMBAR SPINE W/O DYE: CPT

## 2021-11-20 PROCEDURE — 96375 TX/PRO/DX INJ NEW DRUG ADDON: CPT

## 2021-11-20 RX ORDER — OXYCODONE HYDROCHLORIDE 5 MG/1
5 TABLET ORAL ONCE
Status: COMPLETED | OUTPATIENT
Start: 2021-11-20 | End: 2021-11-20

## 2021-11-20 RX ORDER — DEXAMETHASONE SODIUM PHOSPHATE 10 MG/ML
10 INJECTION, SOLUTION INTRAMUSCULAR; INTRAVENOUS ONCE
Status: COMPLETED | OUTPATIENT
Start: 2021-11-20 | End: 2021-11-20

## 2021-11-20 RX ORDER — LIDOCAINE 4 G/G
1 PATCH TOPICAL EVERY 24 HOURS
Qty: 5 PATCH | Refills: 0 | Status: SHIPPED | OUTPATIENT
Start: 2021-11-20 | End: 2022-11-18

## 2021-11-20 RX ORDER — KETOROLAC TROMETHAMINE 15 MG/ML
15 INJECTION, SOLUTION INTRAMUSCULAR; INTRAVENOUS ONCE
Status: COMPLETED | OUTPATIENT
Start: 2021-11-20 | End: 2021-11-20

## 2021-11-20 RX ORDER — LIDOCAINE 4 G/G
1 PATCH TOPICAL ONCE
Status: DISCONTINUED | OUTPATIENT
Start: 2021-11-20 | End: 2021-11-20 | Stop reason: HOSPADM

## 2021-11-20 RX ADMIN — OXYCODONE HYDROCHLORIDE 5 MG: 5 TABLET ORAL at 06:19

## 2021-11-20 RX ADMIN — LIDOCAINE 1 PATCH: 246 PATCH TOPICAL at 03:49

## 2021-11-20 RX ADMIN — KETOROLAC TROMETHAMINE 15 MG: 15 INJECTION, SOLUTION INTRAMUSCULAR; INTRAVENOUS at 05:04

## 2021-11-20 RX ADMIN — DEXAMETHASONE SODIUM PHOSPHATE 10 MG: 10 INJECTION INTRAMUSCULAR; INTRAVENOUS at 06:19

## 2021-11-20 RX ADMIN — HYDROMORPHONE HYDROCHLORIDE 1 MG: 1 INJECTION, SOLUTION INTRAMUSCULAR; INTRAVENOUS; SUBCUTANEOUS at 08:44

## 2021-11-20 ASSESSMENT — ENCOUNTER SYMPTOMS
WEAKNESS: 0
ABDOMINAL PAIN: 0
FEVER: 0
CHILLS: 0
BACK PAIN: 1

## 2021-11-20 NOTE — ED PROVIDER NOTES
History   Chief Complaint:  Back Pain     The history is provided by the patient.      Swathi Gibbons is a 56 year old female with history of psoriatic arthritis (on chronic prednisone), chronic back pain and long term opiate analgesic use with most recent ED visit on 11/9 who presents via EMS for evaluation of back pain. The patient called EMS tonight secondary to severe increase in lower back pain. She was given 100 mcg fentanyl and 0.5 mg ativan en route to the ED by EMS. She was brought in on minimal supplemental nasal cannula as her SpO2 reportedly dropped after medication administration. Upon evaluation here the patient reports that she has not had back pain this severe in the past and that is feels most prominent diffusely in her lower back. At time of evaluation after EMS interventions she rates her pain a 4/10. She states this pain radiates down into her hips and thighs and into her legs. She reports some recent history of leg tingling and heaviness sensation over the last 4 days but no definite weakness. She denies any fever, chills, abdominal pain, bladder/bowel incontinence, and saddle anesthesia. She uses a walker at baseline. Most recent back surgery in 2020. No reported trauma and patient with plans for follow up on 11/29 in place.     [place]  Information:  195 Dilaudid on 11/8  Fentanyl patches 11/9 and 11/12  Diazepam 1 tablet on 11/17    Review of Systems   Constitutional: Negative for chills and fever.   Gastrointestinal: Negative for abdominal pain.        Negative incontinence  Negative saddle anesthesia   Musculoskeletal: Positive for back pain.   Neurological: Negative for weakness.        Tingling in legs      All other systems reviewed and are negative.      Allergies:  Avelox [Moxifloxacin]  Cefaclor  Sulfa Drugs  Trimpethoprim    Medications:  Narcan  Gabapentin  Doxycycline  Calcitonin  Ferrous  Sulfate  Leflunomide  Lurasidone  Levothyroxine  Provigil  Metformin  Furosemide  Trazodone  Aspriin 81 mg  Bupropion  Fentanyl Patch  Ixekizumab auto injector  Vitamin D  Pravastatin  Semaglutide  Prilosec  Diltiazem  Albuterol Inhaler  Toradol    Past Medical History:    Nephrolithiasis  Long term (current use) of opiate analgesics   Overactive bladder  Type II diabetes  Postlaminectomy syndrome  Chronic low  Back pain  Osteoarthritis of both knees  MRSA  Superficial thrombophlebitis  Latent tuberculosis  GERD  Essential hypertension  Psoriasis  Nodular goiter  Vitamin D deficiency  NABOR  Chronic rhinitis  Mild persistent asthma  Esophageal dysmotility  Morbid obesity  COPD  EtOH dependence  Psoriatic arthritis  Hypothyroidism     Past Surgical History:    Ankle Arthoplasty  L4-5 Decompression and Fusion  L3-L4 Minimally invasive interbody fusion  Bladder Surgery  Right Breast Biopsy  Bunionectomy Bilateral  Left Total Shoulder Arthroplasty   x2  Cystoscopy  Dilation and Curettage  Tonsillectomy  Lasik  Bladder Suspension  Stimulator Dorsal Column  Tubal Ligation  Laser holium lithotripsy     Family History:    Cancer    Social History:  The patient presents to the ED alone.  Alcohol Use: No  Drug Use: No  Lives with son at home.     Physical Exam     Patient Vitals for the past 24 hrs:   BP Temp Temp src Pulse Resp SpO2   21 0505 -- -- -- 83 18 96 %   21 0328 135/89 98.3  F (36.8  C) Oral 94 18 90 %       Physical Exam  Vitals reviewed.  General: The patient appears uncomfortable  Head:  The scalp, face, and head appear normal  Eyes:  The pupils are equal and round    Conjunctivae and sclerae are normal  ENT:    Nose normal. Moist mucous membranes  Neck:  Normal range of motion  CV:  Regular rate and underlying rhythm     Normal S1 and S2    DP/PT pulses 2+ bilaterally  Resp:  Lungs are clear    Non-labored breathing    No rales    No wheezing   GI:  Abdomen is soft, there is no rigidity    No  distension    No rebound tenderness     No abdominal tenderness    No guarding  MS:  Back:    The cervical and thoracic spine is non-tender in the midline    The lumbar spine is tender in the midline    There is lumbar paraspinous muscular pain to palpation    Legs:    There is normal motor strength:     Iliopsoas, quadriceps, hamstrings, tibialis anterior, gastrocnemius, EHL    Sensation intact L2-S1 on bilateral lower extremities    Patellar reflexes are normal    There is normal capillary refill to the toes    No calf/ankle tenderness, full active ROM  Skin:  No rash or acute skin lesions noted.  No shingles noted. Ecchymosis noted to bilateral lower extremities of various stages; ecchymosis to L. Foot, no bony tenderness  Neuro: Speech is normal and fluent    Awake and alert    Gait stable    Emergency Department Course   Imaging:  Lumbar Spine CT w/o Contrast:  1.  Hyperdense material within the spinal canal extending from the L2-L3 interspace into the right lateral recess, extending cephalad to the upper L2 level causing complete opacification of the proximal right foraminal zone and right lateral recess and   at least moderate central canal stenosis. This may represent allograft material emanating from the interspace if there is prior fusion at this level, or potentially heterotopic bone formation.   2.  Mild compression deformity involving the inferior endplate of L2 to the right of midline with some bony fragmentation.   3.  Mild loss of height at the superior endplate of L3 laterally on both sides with some slight fragmentation on the right side.   4.  Posterior instrumented fusion at L3-L4. The fusion hardware is intact and well seated.   5.  Decompressive laminectomy at L4-L5.   6.  Moderate left and mild right neural foraminal narrowing at L5-S1. Report per radiology     Laboratory:  CBC: WBC 8.4, HGB 10.8 (L),     BMP: Anion Gap 2 (L), o/w WNL (Creat 0.56)     Emergency Department  Course:  Reviewed:  I reviewed nursing notes, vitals, past medical history and care everywhere    Assessments:  0335 I performed a physical exam of the patient. Findings as above.     0546 Patient rechecked and updated. Plan of care discussed and questions answered.     Consults:   0517 I spoke with radiology concerning patient's imaging results.     0551 I spoke with James ESPINOSA of the neurosurgery service regarding patient's presentation, findings, and plan of care.     0604 I spoke with Dr. Larios of the neurosurgery service at [facility] regarding patient's presentation, findings, and plan of care.     Interventions:  0349 Lidocaine patch 1 transdermal  0504 Toradol 15 mg IV  0619 Decadron 10 mg IV  0619 Roxicodone 5 mg PO    Disposition:  The patient was discharged to home.     Impression & Plan   Medical Decision Making:  Swathi Gibbons is a 56 year old female who presents with acute on chronic back pain.  She is nontoxic on arrival.  Patient was given IV fentanyl and ativan prior to arrival and requiring minimal supplemental nasal cannula on arrival.  This was weaned during her time in the ED. Decision was made to pursue formal CT imaging as patient states she is supposed to have this completed on 11/29 and given that she reports increasing paresthesia and weakness to her lower extremities. She cannot undergo formal MRI 2/2 to spinal stimulator.  She is nontoxic on arrival. CT lumbar without evidence to suggest fracture, epidural hematoma or cauda equina.  CT read of nonspecific material within the L2/L3 interspace.  I discussed this with patient's neurosurgical team who did not feel this is of clinical significance. She has no abdominal pain, clinically doubt intraabdominal catastrophe.    She was given lidocaine patch, decadron given concern for radicular symptoms, IV toradol and oxycodone during her time in the ED.  Patient continued to have some back pain during her time in the ED.  She ambulated however  "without significant difficulty.  I did offer observation for pain control though she is declining at this point in time.  I am hesitant as I discussed with the patient to provide additional narcotics on dispo.  Review of [facility] shows recent prescriptions for Dilaudid, fentanyl patches and Valium. I reviewed our chronic pain policy with the patient and recommended she follow-up closely with her pain specialist.  Her neurosurgical team would also like her to follow-up within the next week.. I will at this point time initiate lidocaine patches on dispo.  Counseled on ice or heat to the back and stretching exercises.  No heavy lifting, bending or twisting. Return if increasing pain, worsening numbness, weakness, or bowel or bladder dysfunction.        Finally patient noted to have ecchymosis to lower extremities.  She denies any known trauma and states \"I bump into things and bruise easily.\"  She has no reproducible pain on exam and I do not feel that formal imaging is warranted as I doubt serious bony injury.      Diagnosis:    ICD-10-CM    1. Back pain, unspecified back location, unspecified back pain laterality, unspecified chronicity  M54.9    2. Abnormal CT scan, lumbar spine  R93.7        Discharge Medications:  New Prescriptions    LIDOCAINE (LIDOCARE) 4 % PATCH    Place 1 patch onto the skin every 24 hours To prevent lidocaine toxicity, patient should be patch free for 12 hrs daily.     Scribe Disclosure:  I, Gustavo Barrientos, am serving as a scribe at 3:27 AM on 11/20/2021 to document services personally performed by Dianna Ochoa DO based on my observations and the provider's statements to me.     [facility]         Dianna Ochoa DO  11/20/21 0656    "

## 2021-11-20 NOTE — ED PROVIDER NOTES
History   Chief Complaint:  Back Pain     The history is provided by the patient.      Swathi Gibbons is a 56 year old female with history of psoriatic arthritis, chronic back pain and long term opiate analgesic use who presents with back pain. Patient reports that she has a history of severe back pain that recently flared up in August. No trauma prior to the flare. States that she presented early this morning, around 0300, with severe low back pain where she received a CT (see results below). She endorses that she discussed a potential MRI with the attending, Dr. Ochoa, however the patient has a [employer] spine stimulator that is not compatible with the MRI in this ED.     During this visit, the patient notes that the pain continues to radiate down her bilateral legs and buttock. Adds that the pain goes from her back to her foot on the left side and from her back to the top of her knee on the right side. No urinary or bowel incontinence. No new weakness or rectal numbness. Reports that she is unable to manage her pain, although she has been prescribed both Dilaudid and Fentanyl patches and muscle relaxers by her pain clinic, which she has been using. She also takes 800 mg of ibuprofen 3 times a day for pain. No fevers, cough, vomiting, diarrhea, dysuria, abdominal pain or hematuria.     Imaging 11/20/21 0443  CT Lumber Spine w/o IV contrast:   1.  Hyperdense material within the spinal canal extending from the L2-L3 interspace into the right lateral recess, extending cephalad to the upper L2 level causing complete opacification of the proximal right foraminal zone and right lateral recess and   at least moderate central canal stenosis. This may represent allograft material emanating from the interspace if there is prior fusion at this level, or potentially heterotopic bone formation.   2.  Mild compression deformity involving the inferior endplate of L2 to the right of midline with some bony fragmentation.    3.  Mild loss of height at the superior endplate of L3 laterally on both sides with some slight fragmentation on the right side.   4.  Posterior instrumented fusion at L3-L4. The fusion hardware is intact and well seated.   5.  Decompressive laminectomy at L4-L5.   6.  Moderate left and mild right neural foraminal narrowing at L5-S1, as per radiology.    Review of Systems  A 10 point ROS was obtained and negative except as noted here and in HPI    Allergies:  Avelox [Moxifloxacin]  Cefaclor  Sulfa Drugs  Trimpethoprim     Medications:  Narcan  Gabapentin  Doxycycline  Calcitonin  Ferrous Sulfate  Leflunomide  Lurasidone  Levothyroxine  Provigil  Metformin  Furosemide  Trazodone  Aspriin 81 mg  Bupropion  Fentanyl Patch  Ixekizumab auto injector  Vitamin D  Pravastatin  Semaglutide  Prilosec  Diltiazem  Albuterol Inhaler  Toradol     Past Medical History:    Nephrolithiasis  Long term (current use) of opiate analgesics   Overactive bladder  Type II diabetes  Postlaminectomy syndrome  Chronic low  Back pain  Osteoarthritis of both knees  MRSA  Superficial thrombophlebitis  Latent tuberculosis  GERD  Essential hypertension  Psoriasis  Nodular goiter  Vitamin D deficiency  NABOR  Chronic rhinitis  Mild persistent asthma  Esophageal dysmotility  Morbid obesity  COPD  EtOH dependence  Psoriatic arthritis  Hypothyroidism      Past Surgical History:    Ankle Arthoplasty  L4-5 Decompression and Fusion  L3-L4 Minimally invasive interbody fusion  Bladder Surgery  Right Breast Biopsy  Bunionectomy Bilateral  Left Total Shoulder Arthroplasty   x2  Cystoscopy  Dilation and Curettage  Tonsillectomy  Lasik  Bladder Suspension  Stimulator Dorsal Column  Tubal Ligation  Laser holium lithotripsy      Family History:    Cancer     Social History:  The patient presents to the ED alone.  Alcohol Use: No  Drug Use: No  Lives with son at home.    Physical Exam     Patient Vitals for the past 24 hrs:   BP Temp Pulse Resp SpO2  Weight   11/20/21 0846 (!) 149/87 -- 91 18 99 % --   11/20/21 0845 -- -- -- -- -- 111.5 kg (245 lb 12.8 oz)   11/20/21 0742 (!) 156/93 97  F (36.1  C) 92 18 99 % --       Physical Exam  VS: Reviewed per above  HENT: normal speech  EYES: sclera anicteric  CV: Rate as noted, regular rhythm.   RESP: Effort normal. Breath sounds are normal bilaterally.  GI: no tenderness/rebound/guarding, not distended.  NEURO: Alert, moving all extremities. No drift with hip flexion in BLE. Intact strength with BL ankle flexion/dorsiflexion.SILT in BLE.  MSK: No deformity of the extremities. TTP over mid upper lumbar spin region.  SKIN: Warm and dry    Emergency Department Course   Emergency Department Course:  Reviewed:  I reviewed nursing notes, vitals, past medical history and Care Everywhere    Assessments:  0826 I obtained history and examined the patient as noted above.   1002 I rechecked the patient and discussed dispo.     Interventions:  0844 Dilaudid, 1 mg, IM    Disposition:  The patient was discharged to home.     Impression & Plan   Medical Decision Making:  Patient presents to the ER for evaluation of acute on chronic low back pain with BL lumbar radicular symptoms. On arrival vital signs are reassuring. On exam there do not appear to be new neurologic deficits in the lower extremities. No reports of bowel or bladder incontinence or perineal anesthesia. I have low suspicion for cauda equina or conus medullaris syndrome at this time.    Per chart review, patient was seen in the ER last night for the symptoms. She had a CT of the lumbar spine that shows evidence of prior fusions of the lumbar spine, laminectomies, as well as nonspecific hyperdense material within the spinal canal at the L2/L3 level, which was interpreted to be possible allograft material versus heterotopic bone formation per radiology. Additionally there is evidence of mild compression deformity involving the inferior endplate of L2. There is also mild loss  of height of the superior endplates of L3 bilaterally. Per review of the ER visit last night, clinical case was discussed with patient's neurosurgery team at [facility]. There were no plans for any emergent intervention based on these findings. The outpatient clinic records suggest that neurosurgery is recommending pt to have upcoming MRI of her low back. There is concern about compatibility with our MRI machine here at [facility] with her spinal stimulator. Nevertheless, patient does not exhibit signs of cauda equina or conus medullaris syndrome necessitating emergent MRI based on my assessment. I also reviewed prior interpretations of lumbar x-rays and CT scans from care everywhere in the recent past. It does appear that these compression deformities versus height loss in L2 and L3 may be new. Certainly this could be contributing to her pain, although it does seem that her symptoms today are combination of midline pain as well as bilateral radiculopathy of the lumbar spine. No blood thinner use to suggest occult spinal epidural hematoma. No reports of fevers to suggest occult epidural abscess. I did consider alternate etiologies of her pain, but she does not have urinary symptoms or unilateral pain to suggest occult renal colic or pyelonephritis/UTI. No abdominal pain to suggest occult sinister intra-abdominal process.    Per review of ED note from last night, patient was offered observation admission for pain control but she declined. I again discussed options including further pain control here versus admission for pain control. Patient preferred to not be admitted for further pain control but was interested in further medications for pain while in the ER. I discussed that further outpatient prescriptions for controlled substances would have to come from her pain clinic or neurosurgery clinic.    Diagnosis:    ICD-10-CM    1. Chronic midline low back pain with bilateral sciatica  M54.41     M54.42     G89.29         Discharge Medications:  New Prescriptions    No medications on file       Scribe Disclosure:  I, Eyad Mayorga, am serving as a scribe at 8:19 AM on 11/20/2021 to document services personally performed by Geoff Hendrix MD based on my observations and the provider's statements to me.            Geoff Hendrix MD  11/20/21 8820

## 2021-11-20 NOTE — ED TRIAGE NOTES
"Patient presents to ED d/t lower back pain radiating to bilateral leg pain. Denies increased weakness. Seen in ED last night waiting in triage and unable to get home. Requesting second opinion. States, \" no one did anything for me here\"     [facility] intact   "

## 2021-11-20 NOTE — ED TRIAGE NOTES
Pt brought by EMS for increase in lower back pain; Pt states hx of chronic lumbar pain; no recent trauma or known causes for increase in pain; pt given 100mcg fentanyl, 0.5mg ativan in route by EMS.

## 2021-11-20 NOTE — PROGRESS NOTES
Contacted regarding 55 yo female with hx of prior lumbar fusion including L3-L4 TLIF and extension of fusion with Dr. Kimble 9/18/21 (prior L4-L5 fusion at [facility]) presenting to [facility] with back pain radiating to legs.    CT lumbar spine:  IMPRESSION:  1.  Hyperdense material within the spinal canal extending from the L2-L3 interspace into the right lateral recess, extending cephalad to the upper L2 level causing complete opacification of the proximal right foraminal zone and right lateral recess and   at least moderate central canal stenosis. This may represent allograft material emanating from the interspace if there is prior fusion at this level, or potentially heterotopic bone formation.  2.  Mild compression deformity involving the inferior endplate of L2 to the right of midline with some bony fragmentation.  3.  Mild loss of height at the superior endplate of L3 laterally on both sides with some slight fragmentation on the right side.  4.  Posterior instrumented fusion at L3-L4. The fusion hardware is intact and well seated.  5.  Decompressive laminectomy at L4-L5.    RECOMMENDATIONS:  Recommend contacting [facility] on call who works with Dr. Kimble to review films and make recommendations.    Silke Bocanegra MPAS  [facility]  [address]  [address] [place] [id number]    Tel [phone]  Pager [phone]

## 2022-07-02 ENCOUNTER — HEALTH MAINTENANCE LETTER (OUTPATIENT)
Age: 57
End: 2022-07-02

## 2022-11-11 NOTE — PROGRESS NOTES
"Tila is a 57 year old who is being evaluated via a billable video visit.      If the video visit is dropped, the invitation should be resent by: Text to cell phone: [phone]  Will anyone else be joining your video visit? No      Video-Visit Details    Type of service:  Video Visit    Video Start Time: 10:05 AM    Video End Time:10:52 AM    Originating Location (pt. Location): Home    Distant Location (provider location):  [facility] [place]     Platform used for Video Visit: [facility] Bariatric Surgery Consultation Note    2022    RE: Swathi Gibbons  MR#: [id number]  : 1965      Referring provider:       11/10/2022   Who referred you? Dr Correa       Chief Complaint/Reason for visit: evaluation for possible weight loss surgery    Dear Estelle Collins (General),    I had the pleasure of seeing your patient, Swathi Gibbons, to evaluate her obesity and consider her for possible weight loss surgery. As you know, Swathi Gibbons is 57 year old.  She has a height of 5' 3\", a weight of 248 lbs 0 oz, and calculated Body mass index is 43.93 kg/m .     Assessment & Plan   Problem List Items Addressed This Visit     Psoriasis    Relevant Medications    ixekizumab (TALTZ) 80 MG/ML SOAJ auto-injector    methylPREDNISolone (MEDROL DOSEPAK) 4 MG tablet therapy pack    Hypothyroidism (acquired)    Relevant Medications    liothyronine (CYTOMEL) 5 MCG tablet    Secondary hyperparathyroidism (H)    Alcohol use disorder, severe, in sustained remission (H)    Esophageal dysmotility    Morbid obesity (H) - Primary    Relevant Medications    Semaglutide, 2 MG/DOSE, (OZEMPIC, 2 MG/DOSE,) 8 MG/3ML SOPN    phentermine (ADIPEX-P) 37.5 MG tablet    Other Relevant Orders    NUTRITION REFERRAL    CBC with platelets    Comprehensive metabolic panel    Hemoglobin A1c    Vitamin D Deficiency    Essential hypertension    Relevant Medications    lisinopril (ZESTRIL) 5 MG tablet    " Hyperlipidemia    Relevant Orders    Comprehensive metabolic panel    Psoriatic arthritis (H)    Relevant Medications    methylPREDNISolone (MEDROL DOSEPAK) 4 MG tablet therapy pack    Diabetes mellitus without complication (H)    Relevant Medications    liothyronine (CYTOMEL) 5 MCG tablet    Semaglutide, 2 MG/DOSE, (OZEMPIC, 2 MG/DOSE,) 8 MG/3ML SOPN    methylPREDNISolone (MEDROL DOSEPAK) 4 MG tablet therapy pack    Other Relevant Orders    NUTRITION REFERRAL    Comprehensive metabolic panel    Hemoglobin A1c    NABOR (obstructive sleep apnea)    Relevant Orders    Comprehensive metabolic panel   Other Visit Diagnoses     Screening for iron deficiency anemia        Relevant Orders    CBC with platelets    Screening for endocrine, nutritional, metabolic and immunity disorder        Relevant Orders    Vitamin D Deficiency           In summary, Swathi Gibbons has Class III obesity with a body mass index of Body mass index is 43.93 kg/m . kg/m2 and the comorbidities stated above. She completed an informational seminar and is a possible candidate for bariatric surgery.       Due to her history of esophageal dysmotility and tortuous esophagus, will review history and EGD results with bariatric surgeon before proceding.  Preliminary tasklist was formulated.  Tila will wait to start this until she get a message back following consult with our bariatric medical director.     PRELIMINARY BARIATRIC TASK LIST   Lose 5 lbs prior to surgery.  Goal Weight: 243 lbs    Have preoperative laboratory tests drawn.     Psychological Evaluation with MMPI and clearance for weight loss surgery.    Achieve clearance from dietitian to see surgeon.    Rhumatology Clearance    Dental clearance- 4 missing teeth.  (3 missing molars)    Surgeon clearance for tortuous esophagus    Letter of support from Psychiatrist    Once Tila has completed the requirements in the above tasklist and there are no further recommendations, she will see the  surgeon for consultation for bariatric surgery. Tila  verbalizes understanding of the process to surgery and the post operative schedule.  All questions were answered.     Pt to follow up in 12 wks for a face to face visit with me. We will discuss the available weight loss surgeries including risks and benefits, get an official weight, review tasklist, and do a physical exam.     65 minutes spent on the date of the encounter doing chart review, history and exam, review test results, counseling, developing plan of care, documentation, and further activities as noted above.       HISTORY OF PRESENT ILLNESS:  Weight Loss History Reviewed with Patient 11/10/2022   How long have you been overweight? Since late 20's to early 40's   What is the most that you have ever weighed? 320   What is the most weight you have lost? 40   I have tried the following methods to lose weight Watching portions or calories, Exercise, Atkins type diet (low carb/high protein), OTC Medications, Prescription Medications   I have tried the following weight loss medications? (Check all that apply) Xenical/Orlistat/José Miguel, Phentermine/Adipex-p/Suprenza, Fen-Phen   Have you ever had weight loss surgery? No       CO-MORBIDITIES OF OBESITY INCLUDE:     11/10/2022   I have the following health issues associated with obesity: Type II Diabetes, High Blood Pressure, High Cholesterol, Sleep Apnea, GERD (Reflux), Fatty Liver, Stress Incontinence, Lymphedema, Osteoarthritis (joint disease), Hypothyroidism   Is having a knee surgery first and was needing to see a nutritionist first.  Was referred to out clinic and always wanted to do bariatric surgery.     Did evaluation through PN about 4 years ago for bariatric surgery. Was working through process to surgery but then 3/2/2018 had EGD which showed Tortuous esophagus so [facility] determined they would not perform surgery.   At the time she had some swallowing trouble.  Dilatation was performed she was put diltazam  to help relax the smooth muscle and she has had no symptoms since.      Findings:        The lower third of the esophagus was mildly tortuous. A guidewire was        placed and the scope was withdrawn. Dilation was performed with a        Savary dilator with moderate resistance at 17 mm. Estimated blood        loss was minimal.        The entire examined stomach was normal.        The duodenal bulb and second portion of the duodenum were normal.   Moderate Sedation:        Moderate (conscious) sedation was administered by the endoscopy nurse        and supervised by the endoscopist. The following parameters were        monitored: oxygen saturation, heart rate, blood pressure, respiratory        rate, EKG, adequacy of pulmonary ventilation, and response to care.        Total physician intraservice time was 6 minutes.   Impression:          - Tortuous esophagus.                        - Normal stomach.                        - Normal duodenal bulb and second portion of the                        duodenum.                        - No specimens collected      PAST MEDICAL HISTORY:  Past Medical History:   Diagnosis Date     Alcohol use disorder, severe, in sustained remission (H)      Alcoholism (H)      Anxiety and depression      Arthritis     PSORIATIC     Asthma      Back pain      Chronic pain      Chronic rhinitis      Chronic rhinitis      COPD (chronic obstructive pulmonary disease) (H)      Depression      Diabetes (H)     Type II     Esophageal dysmotility      EtOH dependence (H)      Gastroesophageal reflux disease      History of blood clots     superficial blood clot - has seen Heme d/t family hx of DVT's     HTN (hypertension)      Hyperlipidemia      Hypertension      Hypothyroidism      Hypothyroidism      Latent tuberculosis      MRSA (methicillin resistant staph aureus) culture positive      Nodular goiter      NABOR (obstructive sleep apnea)     C-PAP     Other chronic pain     back pain     Psoriasis       Psoriatic arthritis (H)      Renal disease      Secondary hyperparathyroidism (H)      Sleep apnea      Tracheostomy in place (H)      Uncomplicated asthma      Vitamin D deficiency        PAST SURGICAL HISTORY: No history of bleeding, clotting, malignant hyperthermia, or other anesthesia problems with surgery. Denies PONV.  Past Surgical History:   Procedure Laterality Date     ANKLE ARTHROPLASTY Left     pins     BACK SURGERY      L4-5 Decompression and Fusion     BLADDER SURGERY       BREAST SURGERY      Right breast biopsy, benign     BUNIONECTOMY Bilateral       SECTION       COMBINED CYSTOSCOPY, INSERT STENT URETER(S) Left 2021    Procedure: CYSTOSCOPY, WITH URETERAL STENT INSERTION;  Surgeon: Luis Antonio Haynes MD;  Location: [facility] OR     DILATION AND CURETTAGE  ,      ENT SURGERY      Tonsillectomy     EYE SURGERY      Lasik     GI SURGERY      Bladder Suspension     GYN SURGERY       Section     GYN SURGERY      Tubal Ligation     INSERT STIMULATOR DORSAL COLUMN N/A 2019    Procedure: INSERTION, SPINAL CORD STIMULATOR, DORSAL COLUMN (MEDTRONIC);  Surgeon: Reginald Pandey MD;  Location: [facility] OR     INSERT STIMULATOR DORSAL COLUMN TRIAL N/A 2019    Procedure: SPINAL CORD STIMULATOR  TRIAL;  Surgeon: Reginald Pandey MD;  Location: [facility] OR     LASER HOLMIUM LITHOTRIPSY URETER(S), INSERT STENT, COMBINED Left 2021    Procedure: CYSTOURETEROSCOPY, WITH  RETROGRADE PYELOGRAMS, INTERPRETATION OF FLUOROSCOPIC IMAGES, STONE BASKET EXTRACTION, LITHOTRIPSY USING LASER AND LEFT URETERAL STENT REMOVAL AND INSERTION;  Surgeon: Luis Antonio Haynes MD;  Location: [facility] OR     ORTHOPEDIC SURGERY      ORIF Ankle Left 2017     ORTHOPEDIC SURGERY      Bunionectomy Nolan.     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 Yes   Who do you have in your support network that can be available to help you for the first 2 weeks after surgery? My sons   Who can you count on for support throughout your weight loss surgery journey? My sons, sisters   Can you afford 3 meals a day?  Yes   Can you afford 50-60 dollars a month for vitamins? No   Son age 24 lives at home with her.      HABITS:     11/10/2022   How often do you drink alcohol? Never   Do you currently use any of the following Nicotine products? e-cigarettes   Have you ever used any of the following nicotine products? Cigarettes   If you previously used any of these products, what year did you quit? 2018   Have you or are you currently using street drugs or prescription strength medication for which you do not have a prescription for? No   Do you have a history of chemical dependency (alcohol or drug abuse)? Yes   8 year sober form Alcohol.      PSYCHOLOGICAL HISTORY:   Psychological History Reviewed With Patient 11/10/2022   Have you ever attempted suicide? More than 10 years ago.   Have you had thoughts of suicide in the past year? No   Have you ever been hospitalized for mental illness or a suicide attempt? More than 10 years ago.   Do you have a history of chronic pain? Yes   Have you ever been diagnosed with fibromyalgia? No   Are you currently being treated for any of the following? (select all that apply) Depression, Anxiety, Post traumatic stress disorder   Are you currently seeing a therapist or counselor?  No   Are you currently seeing a psychiatrist? Yes       ROS:      11/10/2022   Skin:  Skin fold rashes (groin or other folds), Leg swelling   HEENT: Missing teeth, Teeth, dentures, or bridges needing repairs   If you answered yes to missing teeth, please indicate how many: 4- Two molars on top left and bottom right 1 molar.     Musculoskeletal: Joint Pain, Back pain, Limited mobility, Swelling of legs, Arthritis   Cardiovascular: None of the above   Pulmonary: Excessively sleep during the day   Gastrointestinal: Difficulty swallowing of, resolved with diltiazem and dilatation.     Genitourinary: Stress incontinence (losing urine when coughing, sneezing, etc.)   Hematological: Family history of blood clots mother and uncle had work up with hematology after superficial blood clot due to varicosities and it was not genetic.     Neurological: None of the above   Female only: Post-menopausal   Uses a walker for balance.      EATING BEHAVIORS:     11/10/2022   Have you or anyone else thought that you had an eating disorder? No   Do you currently binge eat (eat a large amount of food in a short time)? No   Are you an emotional eater? Yes   Do you get up to eat after falling asleep? No       EXERCISE:     11/10/2022   How often do you exercise? 1 to 2 times per week   What is the duration of your exercise (in minutes)? 20 Minutes   What types of exercise do you do? walking, other   What keeps you from being more active?  Pain, My ability to walk or move around is limited, Too tired       MEDICATIONS:  Current Outpatient Medications   Medication     acetaminophen (TYLENOL) 325 MG tablet     albuterol (PROAIR HFA/PROVENTIL HFA/VENTOLIN HFA) 108 (90 Base) MCG/ACT inhaler     buPROPion (WELLBUTRIN XL) 300 MG 24 hr tablet     calcitonin, salmon, (MIACALCIN) 200 UNIT/ACT nasal spray     calcium carbonate-vitamin D (OSCAL) 500-5 MG-MCG tablet     cycloSPORINE (RESTASIS) 0.05 % ophthalmic emulsion     diltiazem (CARDIZEM) 60 MG tablet     fentaNYL (DURAGESIC) 12 mcg/hr 72 hr patch     fentaNYL  (DURAGESIC) 25 mcg/hr 72 hr patch     ferrous sulfate (FEROSUL) 325 (65 Fe) MG tablet     fish oil-omega-3 fatty acids 1000 MG capsule     fluticasone (FLONASE) 50 MCG/ACT nasal spray     fluticasone-salmeterol (ADVAIR) 100-50 MCG/DOSE inhaler     gabapentin (NEURONTIN) 300 MG capsule     imiquimod (ALDARA) 5 % external cream     ixekizumab (TALTZ) 80 MG/ML SOAJ auto-injector     leflunomide (ARAVA) 20 MG tablet     liothyronine (CYTOMEL) 5 MCG tablet     lisinopril (ZESTRIL) 5 MG tablet     lurasidone (LATUDA) 20 MG TABS tablet     methylPREDNISolone (MEDROL DOSEPAK) 4 MG tablet therapy pack     multivitamin, therapeutic (THERA-VIT) TABS tablet     naloxone (NARCAN) 4 MG/0.1ML nasal spray     nystatin (MYCOSTATIN) 584474 UNIT/GM external cream     nystatin (MYCOSTATIN) 178786 UNIT/GM external powder     omeprazole (PRILOSEC) 20 MG DR capsule     phentermine (ADIPEX-P) 37.5 MG tablet     polyethylene glycol (MIRALAX) 17 g packet     polyvinyl alcohol (LIQUIFILM TEARS) 1.4 % ophthalmic solution     pravastatin (PRAVACHOL) 80 MG tablet     Semaglutide, 2 MG/DOSE, (OZEMPIC, 2 MG/DOSE,) 8 MG/3ML SOPN     venlafaxine (EFFEXOR-XR) 150 MG 24 hr capsule     vitamin D2 (ERGOCALCIFEROL) 89637 units (1250 mcg) capsule     Xylitol 500 MG DISK     fluconazole (DIFLUCAN) 200 MG tablet     No current facility-administered medications for this visit.        ALLERGIES:  Allergies   Allergen Reactions     Avelox [Moxifloxacin] Anaphylaxis     Requiring epinephrine     Cefaclor Hives     Tolerated cephalexin, tolerated cefepime     Sulfa Drugs Hives       LABS AND RECORDS REVIEWED:  Hemoglobin A1C   Date Value Ref Range Status   04/13/2021 5.6 0 - 5.6 % Final     Comment:     Normal <5.7% Prediabetes 5.7-6.4%  Diabetes 6.5% or higher - adopted from ADA   consensus guidelines.       TSH   Date Value Ref Range Status   09/14/2007 2.73 0.4 - 5.0 mU/L Final     Sodium   Date Value Ref Range Status   11/20/2021 138 133 - 144 mmol/L Final    04/15/2021 140 133 - 144 mmol/L Final     Potassium   Date Value Ref Range Status   11/20/2021 4.1 3.4 - 5.3 mmol/L Final   05/04/2021 3.7 3.4 - 5.3 mmol/L Final     Chloride   Date Value Ref Range Status   11/20/2021 104 94 - 109 mmol/L Final   04/15/2021 108 94 - 109 mmol/L Final     Carbon Dioxide   Date Value Ref Range Status   04/15/2021 24 20 - 32 mmol/L Final     Carbon Dioxide (CO2)   Date Value Ref Range Status   11/20/2021 32 20 - 32 mmol/L Final     Anion Gap   Date Value Ref Range Status   11/20/2021 2 (L) 3 - 14 mmol/L Final   04/15/2021 8 3 - 14 mmol/L Final     Glucose   Date Value Ref Range Status   11/20/2021 92 70 - 99 mg/dL Final   04/15/2021 108 (H) 70 - 99 mg/dL Final     Urea Nitrogen   Date Value Ref Range Status   11/20/2021 11 7 - 30 mg/dL Final   04/15/2021 20 7 - 30 mg/dL Final     Creatinine   Date Value Ref Range Status   11/20/2021 0.56 0.52 - 1.04 mg/dL Final   05/04/2021 0.62 0.52 - 1.04 mg/dL Final     GFR Estimate   Date Value Ref Range Status   11/20/2021 >90 >60 mL/min/1.73m2 Final     Comment:     As of July 11, 2021, eGFR is calculated by the CKD-EPI creatinine equation, without race adjustment. eGFR can be influenced by muscle mass, exercise, and diet. The reported eGFR is an estimation only and is only applicable if the renal function is stable.   05/04/2021 >90 >60 mL/min/[1.73_m2] Final     Comment:     Non  GFR Calc  Starting 12/18/2018, serum creatinine based estimated GFR (eGFR) will be   calculated using the Chronic Kidney Disease Epidemiology Collaboration   (CKD-EPI) equation.       Calcium   Date Value Ref Range Status   11/20/2021 8.7 8.5 - 10.1 mg/dL Final   04/15/2021 8.4 (L) 8.5 - 10.1 mg/dL Final     Bilirubin Total   Date Value Ref Range Status   04/12/2021 0.4 0.0 - 1.0 mg/dL Final   09/14/2007 0.4 0.2 - 1.3 mg/dL Final     Alkaline Phosphatase   Date Value Ref Range Status   04/12/2021 78 45 - 120 U/L Final   09/14/2007 121 40 - 150 U/L Final  "    ALT   Date Value Ref Range Status   04/12/2021 14 0 - 45 U/L Final   09/14/2007 11 0 - 50 U/L Final     AST   Date Value Ref Range Status   04/12/2021 15 0 - 40 U/L Final   09/14/2007 26 0 - 45 U/L Final     WBC   Date Value Ref Range Status   04/15/2021 4.1 4.0 - 11.0 10e9/L Final     WBC Count   Date Value Ref Range Status   11/20/2021 8.4 4.0 - 11.0 10e3/uL Final     Hemoglobin   Date Value Ref Range Status   11/20/2021 10.8 (L) 11.7 - 15.7 g/dL Final   05/04/2021 10.6 (L) 11.7 - 15.7 g/dL Final     Hematocrit   Date Value Ref Range Status   11/20/2021 35.3 35.0 - 47.0 % Final   04/15/2021 29.4 (L) 35.0 - 47.0 % Final     MCV   Date Value Ref Range Status   11/20/2021 96 78 - 100 fL Final   04/15/2021 89 78 - 100 fl Final     Platelet Count   Date Value Ref Range Status   11/20/2021 214 150 - 450 10e3/uL Final   05/04/2021 235 150 - 450 10e9/L Final         PHYSICAL EXAM:  Ht 5' 3\" (1.6 m)   Wt 248 lb (112.5 kg)   BMI 43.93 kg/m    GENERAL: Healthy, alert and no distress  EYES: Eyes grossly normal to inspection.  No discharge or erythema, or obvious scleral/conjunctival abnormalities.  RESP: No audible wheeze, cough, or visible cyanosis.  No visible retractions or increased work of breathing.    SKIN: Visible skin clear. No significant rash, abnormal pigmentation or lesions.  NEURO: Cranial nerves grossly intact.  Mentation and speech appropriate for age.  PSYCH: Mentation appears normal, affect normal/bright, judgement and insight intact, normal speech and appearance well-groomed.      Sincerely,     June Willard PA-C        "

## 2022-11-18 ENCOUNTER — VIRTUAL VISIT (OUTPATIENT)
Dept: SURGERY | Facility: CLINIC | Age: 57
End: 2022-11-18
Payer: COMMERCIAL

## 2022-11-18 VITALS — WEIGHT: 248 LBS | HEIGHT: 63 IN | BODY MASS INDEX: 43.94 KG/M2

## 2022-11-18 DIAGNOSIS — L40.9 PSORIASIS: ICD-10-CM

## 2022-11-18 DIAGNOSIS — K22.4 ESOPHAGEAL DYSMOTILITY: ICD-10-CM

## 2022-11-18 DIAGNOSIS — E03.9 HYPOTHYROIDISM (ACQUIRED): ICD-10-CM

## 2022-11-18 DIAGNOSIS — Z13.0 SCREENING FOR ENDOCRINE, NUTRITIONAL, METABOLIC AND IMMUNITY DISORDER: ICD-10-CM

## 2022-11-18 DIAGNOSIS — F10.21 ALCOHOL USE DISORDER, SEVERE, IN SUSTAINED REMISSION (H): ICD-10-CM

## 2022-11-18 DIAGNOSIS — I10 ESSENTIAL HYPERTENSION: ICD-10-CM

## 2022-11-18 DIAGNOSIS — E11.9 DIABETES MELLITUS WITHOUT COMPLICATION (H): ICD-10-CM

## 2022-11-18 DIAGNOSIS — Z13.228 SCREENING FOR ENDOCRINE, NUTRITIONAL, METABOLIC AND IMMUNITY DISORDER: ICD-10-CM

## 2022-11-18 DIAGNOSIS — Z13.21 SCREENING FOR ENDOCRINE, NUTRITIONAL, METABOLIC AND IMMUNITY DISORDER: ICD-10-CM

## 2022-11-18 DIAGNOSIS — L40.50 PSORIATIC ARTHRITIS (H): ICD-10-CM

## 2022-11-18 DIAGNOSIS — G47.33 OSA (OBSTRUCTIVE SLEEP APNEA): ICD-10-CM

## 2022-11-18 DIAGNOSIS — E78.5 HYPERLIPIDEMIA, UNSPECIFIED HYPERLIPIDEMIA TYPE: ICD-10-CM

## 2022-11-18 DIAGNOSIS — N25.81 SECONDARY HYPERPARATHYROIDISM (H): ICD-10-CM

## 2022-11-18 DIAGNOSIS — Z13.29 SCREENING FOR ENDOCRINE, NUTRITIONAL, METABOLIC AND IMMUNITY DISORDER: ICD-10-CM

## 2022-11-18 DIAGNOSIS — Z13.0 SCREENING FOR IRON DEFICIENCY ANEMIA: ICD-10-CM

## 2022-11-18 DIAGNOSIS — E66.01 MORBID OBESITY (H): Primary | ICD-10-CM

## 2022-11-18 PROBLEM — M17.0 OSTEOARTHRITIS OF BOTH KNEES: Status: ACTIVE | Noted: 2017-03-03

## 2022-11-18 PROBLEM — R26.2 DIFFICULTY WALKING: Status: ACTIVE | Noted: 2022-02-07

## 2022-11-18 PROBLEM — H81.10 BPPV (BENIGN PAROXYSMAL POSITIONAL VERTIGO), UNSPECIFIED LATERALITY: Status: ACTIVE | Noted: 2021-07-16

## 2022-11-18 PROBLEM — M54.50 CHRONIC LOW BACK PAIN: Status: ACTIVE | Noted: 2018-06-25

## 2022-11-18 PROBLEM — Z98.1 S/P LUMBAR FUSION: Status: ACTIVE | Noted: 2021-12-07

## 2022-11-18 PROBLEM — M43.10 SPONDYLOLISTHESIS, ACQUIRED: Status: ACTIVE | Noted: 2017-06-29

## 2022-11-18 PROBLEM — M79.7 FIBROMYALGIA: Status: ACTIVE | Noted: 2017-09-01

## 2022-11-18 PROBLEM — N32.81 OVERACTIVE BLADDER: Status: ACTIVE | Noted: 2019-05-02

## 2022-11-18 PROBLEM — Z96.612 S/P REVERSE TOTAL SHOULDER ARTHROPLASTY, LEFT: Status: ACTIVE | Noted: 2021-03-16

## 2022-11-18 PROBLEM — G25.0 BENIGN ESSENTIAL TREMOR: Status: ACTIVE | Noted: 2019-05-02

## 2022-11-18 PROBLEM — G89.29 CHRONIC LOW BACK PAIN: Status: ACTIVE | Noted: 2018-06-25

## 2022-11-18 PROBLEM — F11.90 CHRONIC, CONTINUOUS USE OF OPIOIDS: Status: ACTIVE | Noted: 2019-02-22

## 2022-11-18 PROCEDURE — 99205 OFFICE O/P NEW HI 60 MIN: CPT | Mod: 95 | Performed by: PHYSICIAN ASSISTANT

## 2022-11-18 RX ORDER — METHYLPREDNISOLONE 4 MG
4 TABLET, DOSE PACK ORAL DAILY
Qty: 21 TABLET | Refills: 0 | COMMUNITY
Start: 2022-11-18

## 2022-11-18 RX ORDER — LISINOPRIL 5 MG/1
5 TABLET ORAL DAILY
COMMUNITY
Start: 2022-11-18

## 2022-11-18 RX ORDER — IXEKIZUMAB 80 MG/ML
80 INJECTION, SOLUTION SUBCUTANEOUS
COMMUNITY
Start: 2022-11-18

## 2022-11-18 RX ORDER — SEMAGLUTIDE 2.68 MG/ML
2 INJECTION, SOLUTION SUBCUTANEOUS WEEKLY
Qty: 24 ML | Refills: 1 | COMMUNITY
Start: 2022-11-18

## 2022-11-18 RX ORDER — LIOTHYRONINE SODIUM 5 UG/1
5 TABLET ORAL 2 TIMES DAILY
COMMUNITY
Start: 2022-11-18

## 2022-11-18 RX ORDER — PHENTERMINE HYDROCHLORIDE 37.5 MG/1
37.5 TABLET ORAL
Qty: 90 TABLET | Refills: 0 | COMMUNITY
Start: 2022-11-18

## 2022-11-18 NOTE — LETTER
Swathi Gibbons  November 18, 2022        Bariatric Task List      Required Weight loss:    Lose 5 lbs prior to surgery.  Goal Weight: 243 lbs  Tasks:  Have preoperative laboratory tests drawn.     Psychological Evaluation with MMPI and clearance for weight loss surgery.    Achieve clearance from dietitian to see surgeon.    Rhumatology Clearance    Endocrinology Clearance    Dental clearance- 4 missing teeth.  (3 missing molars)    Surgeon clearance for tortuous esophagus    Letter of support from Psychiatrist

## 2022-11-18 NOTE — PATIENT INSTRUCTIONS
Nice to meet you today.  Below is our plan we discussed.-  FRANCISCA Watkins  Plan:   Due to your history of esophageal dysmotility and tortuous esophagus, I will review your history and EGD results with bariatric surgery before proceding.  Below is your preliminary tasklist . Please wait to start this  until you get a message back following consult with our bariatric medical director.   Bariatric Task List  Lose 5 lbs prior to surgery.  Goal Weight: 243 lbs  Have preoperative laboratory tests drawn.   Psychological Evaluation with MMPI and clearance for weight loss surgery.  Achieve clearance from dietitian to see surgeon.  Rhumatology Clearance  Dental clearance or plan for missing teeth replaced- 4 missing teeth.  (3 missing molars)   Surgeon clearance for tortuous esophagus  Letter of support from Psychiatrist    Labs have been ordered in the system for you. You can have these drawn at any [facility] lab.  Please call [phone] set up an appointment.  Your results will be posted on [facility] as soon as they're complete.  After all are resulted, I will review and comment to you.  Psychological evaluation was ordered.  Call [phone] to be scheduled with Keyon Bird, PhD,    FOLLOW-UP:  Call [phone] to make appointment to return for a pre-surgery in clinic visit in 8-10 weeks to see June Willard PA-C. Make 1 month follow up nutrition visit virtually or in clinic.  _____________________________________________________________________  In general before being submitted for insurance approval, you will need the following:  -Clearance by the Psychologist  -Clearance by the dietitian  -Structured weight loss visits if mandated by your insurance carrier  -Surgeon consult  -Use CPAP for at least one month before surgery if you have sleep apnea. Make sure to bring your CPAP or BiPAP to the hospital at the time of surgery.  -You will need to be tobacco free for 3 months before surgery and remain a non-smoker  thereafter. If you are currently smoking or have recently quit, your urine will be evaluated for tobacco metabolites pre-operatively.  -If you have diabetes, you will need to have an A1C less than or equal to 8 within 3 months of surgery.  -You will need an exercise plan which includes MOVE, ie., walking and MUSCLE, ie.,walking, bands, weight, machines, etc...  _____________________________________________________________________  After Bariatric Surgery:   Vitamin supplementation is a lifelong need. You will take:  B-12 1000 mcg or higher sublingual (under the tongue) daily or by injection 1-2X /month  Vitamin D3 5000 IU daily   Multivitamin containing 18mg of iron twice a day  Calcium citrate 2 daily  Thiamine 100 mg weekly   Vitron C once daily if you are a menstruating female  Follow up is impotent to keep your weight off and your vitamin levels up.  Your labs will be monitored every 3 months for the first year and yearly thereafter.  Prevent Ulcers by avoiding tobacco and anti-inflammatories (NSAIDS) forever.  Also alcohol in excess, caffeine in excess. NSAIDS examples:Aspirin, Ibuprofen, Naproxen and similar medications) Tylenol is fine.  Protect you stomach if on Asprin.  If you are told by your physician take Aspirin to protect your heart or for another reason, make sure to take omeprazole or similar medication (protonix, nexium, prevacid) to protect your stomach.  Alcohol affects you differently. There is a 10% increase of Alcohol Use Disorder in patients with bariatric surgery.   If you have even ONE drink DO NOT DRIVE.

## 2022-11-18 NOTE — LETTER
[facility]   Comprehensive Weight Management  [place] [facility]  [address], [address]  [place]  [address]  Phone:  [phone]  Fax:  [phone]                    Bariatric Letter of Clearance from Rheumatology    Dear Rheumatologist:    Thank you for taking the time to evaluate this patient.  She/he is considering bariatric surgery.  Often times patients with RA are on immunosuppressants and prednisone.  It is important to us that our surgical patients who have RA get clearance before surgery.    Please write a letter of support including the following information:     1.  Is the patient under your care?  If so, for how long?   2.  Comment on the relevant diagnosis.   3.  Please let us know your proposed RA medication plan for the patient to get them ready for surgery.   4.  Provide a statement that the patient is (or is not) cleared and stable to proceed with bariatric surgery from a rheumatology standpoint.    Sincerely,    FRANCISCA Pinto [facility]      Please fax the letter of support to [phone].

## 2022-11-18 NOTE — LETTER
Swathi Gibbons  November 18, 2022        Bariatric Task List      Required Weight loss:    Lose 5 lbs prior to surgery.  Goal Weight: 243 lbs  Tasks:  Have preoperative laboratory tests drawn.     Psychological Evaluation with MMPI and clearance for weight loss surgery.    Achieve clearance from dietitian to see surgeon.    Rhumatology Clearance    Dental clearance- 4 missing teeth.  (3 missing molars)    Surgeon clearance for tortuous esophagus    Letter of support from Psychiatrist

## 2022-11-21 RX ORDER — HYDROMORPHONE HYDROCHLORIDE 4 MG/1
4 TABLET ORAL EVERY 6 HOURS PRN
Status: ON HOLD | COMMUNITY
End: 2022-12-01

## 2022-11-21 RX ORDER — FENTANYL 75 UG/1
0.5 PATCH TRANSDERMAL
Status: ON HOLD | COMMUNITY
End: 2022-11-30

## 2022-11-21 NOTE — PROVIDER NOTIFICATION
Discharge plan according to [facility]:       10/27/22 2159   Discharge Planning   Patient/Family Anticipates Transition to home   Concerns to be Addressed all concerns addressed in this encounter   Living Arrangements   People in Home child(tammy), adult   Type of Residence Private Residence   Is your private residence a single family home or apartment? Apartment   Number of Stairs, Within Home, Primary none   Stair Railings, Within Home, Primary none   Once home, are you able to live on one level? Yes   Which level? Main Level   Bathroom Shower/Tub Tub/Shower unit   Equipment Currently Used at Home walker, rolling;cane, straight;dressing device;raised toilet seat;shower chair   Support System   Support Systems Children   Do you have someone available to stay with you one or two nights once you are home? Yes

## 2022-11-22 ENCOUNTER — TRANSFERRED RECORDS (OUTPATIENT)
Dept: MULTI SPECIALTY CLINIC | Facility: CLINIC | Age: 57
End: 2022-11-22

## 2022-11-22 LAB
ALT SERPL-CCNC: 19 U/L
AST SERPL-CCNC: 16 U/L (ref 10–40)
CREATININE (EXTERNAL): 0.69 MG/DL (ref 0.55–1.02)
GFR ESTIMATED (EXTERNAL): >60 ML/MIN/1.73M2
GLUCOSE (EXTERNAL): 97 MG/DL (ref 70–100)
HBA1C MFR BLD: 5.7 %
INR (EXTERNAL): 0.9 (ref 0.9–1.1)
LDL CHOLESTEROL DIRECT (EXTERNAL): 107 MG/DL
POTASSIUM (EXTERNAL): 3.8 MMOL/L (ref 3.5–5.1)

## 2022-11-27 NOTE — PROGRESS NOTES
"Video-Visit Details    Type of service:  Video Visit    Video Start Time (time video started): 12:58    Video End Time (time video stopped): 1:51    Originating Location (pt. Location): Home    Distant Location (provider location):  Off-site    Mode of Communication:  [facility]      New Bariatric Nutrition Consultation Note    Reason For Visit: Nutrition Assessment    Swathi Gibbons is a 57 year old presenting today for new bariatric nutrition consult.  Pt is interested in laparoscopic undecided.  Patient is accompanied by self.    Support System Reviewed With Patient 11/10/2022   Who do you have in your support network that can be available to help you for the first 2 weeks after surgery? My sons   Who can you count on for support throughout your weight loss surgery journey? My sons, sisters       ANTHROPOMETRICS:  Estimated body mass index is 43.93 kg/m  as calculated from the following:    Height as of 11/18/22: 1.6 m (5' 3\").    Weight as of 11/18/22: 112.5 kg (248 lb).    Required weight loss goal pre-op:5 lbs from initial consult weight (goal weight 243 lbs or less before surgery)       11/10/2022   I have tried the following methods to lose weight Watching portions or calories, Exercise, Atkins type diet (low carb/high protein), OTC Medications, Prescription Medications       Weight Loss Questions Reviewed With Patient 11/10/2022   How long have you been overweight? Since late 20's to early 40's       SUPPLEMENT INFORMATION:  08065 international unit(s) Vitamin D 4X per week  1 multivitamin    NUTRITION HISTORY:  Recall Diet Questions Reviewed With Patient 11/10/2022   Describe what you typically consume for breakfast (typical or most recent): Scrambled egg with onions and peppers @ 8 am   Describe what you typically consume for lunch (typical or most recent): Banana, chicken taco @ noon   Describe what you typically consume for supper (typical or most recent): Dish I prepare. " Hamburger, broccoli, chickpea @ 5p   Describe what you typically consume as snacks (typical or most recent): Cottage cheese,chips and cottage cheese, ice cream chicken taco-1X per day    How many ounces of water, or other low calorie drinks, do you drink daily (8 oz=1 glass)? 32 oz   How many ounces of caffeine (coffee, tea, pop) do you drink daily (8 oz=1 glass)? 32 oz-coffee + coconut milk creamer or diet Mountain Dew   How many ounces of carbonated (pop, beer, sparkling water) drinks do you drinky daily (8 oz=1 glass)? 16 oz-carbonated chappell   How many ounces of juice, pop, sweet tea, sports drinks, protein drinks, other sweetened drinks, do you drink daily (8 oz=1 glass)? 0 oz   How many ounces of milk do you drink daily (8 oz=1 glass) 8 oz   Please indicate the type of milk: 2%   How often do you drink alcohol? Never       Eating Habits 11/10/2022   Do you have any dietary restrictions? Yes   Do you currently binge eat (eat a large amount of food in a short time)? No   Are you an emotional eater? Yes-bored, anger, celebration   Do you get up to eat after falling asleep? No   What foods do you crave? Chocolate (peanut butter cups), bakery goods       ADDITIONAL INFORMATION:  Note patient  has esophageal dysmotility and tortuous esophagus and will need approval from the Medical Director. Patient went  through the surgical weight loss program at [facility] ~ 3 years ago. Patient orders groceries on line and does all of her own cooking. An adult son lives with her, but he does his own cooking.  She is not a big fan of some meats (chicken or beef or pork). She does like fish, ground beef, eggs, cheese, yogurt, turkey burgers. Does meal prep for breakfast and dinners. Stops eating at 6 pm due to doing intermittent fasting. She avoids lactose. Eight years sober from ETOH. Struggles with Psoriatic arthritis.    Dining Out History Reviewed With Patient 11/10/2022   How often do you dine out? Never.       Physical  Activity Reviewed With Patient 11/10/2022   How often do you exercise? 1 to 2 times per week   What is the duration of your exercise (in minutes)? 20 Minutes   What types of exercise do you do? walking, other   What keeps you from being more active?  Pain, My ability to walk or move around is limited, Too tired       NUTRITION DIAGNOSIS:  Obesity r/t long history of self-monitoring deficit and excessive energy intake aeb BMI >30 kg/m2.    INTERVENTION:  Intervention Provided/Education Provided on post-op diet guidelines, vitamins/minerals essential post-operatively, GI anatomy of bariatric surgeries, ways to help prepare for post-op diet guidelines pre-operatively, portion/calorie-control, emotional eating.  Provided pt with list of goals RD contact information.      Questions Reviewed With Patient 11/10/2022   How ready are you to make changes regarding your weight? Number 1 = Not ready at all to make changes up to 10 = very ready. 10   How confident are you that you can change? 1 = Not confident that you will be successful making changes up to 10 = very confident. 9       Patient Understanding: fair-good  Expected Compliance: fair-good    GOALS:  Increase water to at least 48 oz per day  Take at least 20 minute for each meal  Focus on alternatives to emotional eating (meditation, read, games on phone, cleaning)    Follow-Up:   Recommend 2-3 follow up visits to assist with lifestyle changes or per insurance.      Time spent with patient: 53 minutes.  Barrington Trejo RD, LD  [facility]

## 2022-11-28 ENCOUNTER — VIRTUAL VISIT (OUTPATIENT)
Dept: SURGERY | Facility: CLINIC | Age: 57
End: 2022-11-28
Payer: COMMERCIAL

## 2022-11-28 DIAGNOSIS — E66.01 MORBID OBESITY (H): ICD-10-CM

## 2022-11-28 PROCEDURE — 97802 MEDICAL NUTRITION INDIV IN: CPT | Mod: 95 | Performed by: DIETITIAN, REGISTERED

## 2022-11-28 RX ORDER — ASPIRIN 81 MG/1
81 TABLET, CHEWABLE ORAL DAILY
Status: ON HOLD | COMMUNITY
End: 2022-12-01

## 2022-11-28 NOTE — PATIENT INSTRUCTIONS
Huan Adorno-  Welcome to the [facility], [place]! It was great to visit with you and learn about in weight loss surgery. Below are the goals we discussed.    GOALS:  Increase water to at least 48 oz per day  Take at least 20 minute for each meal  Focus on alternatives to emotional eating (meditation, read, games on phone, cleaning)    Nutrition Educational Materials:  Building a Healthy Relationship with Food  [email or web address]  Emotional Eating: How to [place]  [email or web address]    Weight Loss Surgery   Your Stage 1 Diet: Clear Liquids  [email or web address]     Your Stage 2 Diet: Low-fat Full Liquids  [email or web address]     Your Stage 3 Diet: Pureed Foods  [email or web address]     Your Stage 4 Diet: Soft Foods  [email or web address]    Your Stage 5 Diet: Regular Foods  [email or web address]    Diet Guidelines after Weight-loss Surgery  [email or web address]     Keeping Track of Your Fluids  [email or web address]    Daily Food and Exercise Log  [email or web address]    Supplements after Sleeve Gastrectomy, Gastric Bypass or Single Anastomosis Duodenal Switch Surgery  [email or web address]         Please call [phone] to schedule your next visit with a Dietitian in 1 month.  Thanks!  Barrington Trejo, RON, LD  [facility]

## 2022-11-28 NOTE — PROGRESS NOTES
Liberty Irizarry RN in WW Pre-op requested review to validate if this patient met discontinuation of MRSA infection flag for upcoming surgery because per patient, she meets criteria to resolve the MRSA infection flag.     .Infection Prevention Progress Note  11/28/2022      Patient Name: Swathi Gibbons [id number]  Admit Date: (Not on file)    Infection Status as of 11/28/2022 2:10 PM: No active infections  Isolation Status as of 11/28/2022 2:10 PM: No active isolations     MDRO Discontinuation  Infection Prevention has reviewed this patient's chart per the MDRO D/C Policy and have taken the following action:    Patient meets all the criteria for discontinuation and Infection Prevention will resolve the MRSA infection status.  Contact Precautions discontinued for the following MDRO(s): MRSA. Patient has two negative nares cultures in CareEverywhere ([facility] 08/11/2017 and 07/09/2020). Both are after 1/11/17 back wound infection (also [facility] labs in care everywhere.     If you have any questions, please contact Infection Prevention.    Doreen uMnoz, Infection Prevention

## 2022-11-29 ENCOUNTER — LAB (OUTPATIENT)
Dept: LAB | Facility: CLINIC | Age: 57
End: 2022-11-29
Attending: FAMILY MEDICINE
Payer: COMMERCIAL

## 2022-11-29 DIAGNOSIS — Z01.818 PRE-OPERATIVE GENERAL PHYSICAL EXAMINATION: ICD-10-CM

## 2022-11-29 PROCEDURE — U0005 INFEC AGEN DETEC AMPLI PROBE: HCPCS

## 2022-11-29 PROCEDURE — U0003 INFECTIOUS AGENT DETECTION BY NUCLEIC ACID (DNA OR RNA); SEVERE ACUTE RESPIRATORY SYNDROME CORONAVIRUS 2 (SARS-COV-2) (CORONAVIRUS DISEASE [COVID-19]), AMPLIFIED PROBE TECHNIQUE, MAKING USE OF HIGH THROUGHPUT TECHNOLOGIES AS DESCRIBED BY CMS-2020-01-R: HCPCS

## 2022-11-30 ENCOUNTER — APPOINTMENT (OUTPATIENT)
Dept: RADIOLOGY | Facility: CLINIC | Age: 57
End: 2022-11-30
Payer: COMMERCIAL

## 2022-11-30 ENCOUNTER — HOSPITAL ENCOUNTER (OUTPATIENT)
Facility: CLINIC | Age: 57
LOS: 1 days | Discharge: HOME | End: 2022-12-01
Attending: ORTHOPAEDIC SURGERY | Admitting: ORTHOPAEDIC SURGERY
Payer: COMMERCIAL

## 2022-11-30 ENCOUNTER — ANESTHESIA (OUTPATIENT)
Dept: SURGERY | Facility: CLINIC | Age: 57
End: 2022-11-30
Payer: COMMERCIAL

## 2022-11-30 ENCOUNTER — ANESTHESIA EVENT (OUTPATIENT)
Dept: SURGERY | Facility: CLINIC | Age: 57
End: 2022-11-30
Payer: COMMERCIAL

## 2022-11-30 DIAGNOSIS — Z96.652 S/P TOTAL KNEE ARTHROPLASTY, LEFT: Primary | ICD-10-CM

## 2022-11-30 LAB
GLUCOSE BLDC GLUCOMTR-MCNC: 104 MG/DL (ref 70–99)
GLUCOSE BLDC GLUCOMTR-MCNC: 135 MG/DL (ref 70–99)
GLUCOSE BLDC GLUCOMTR-MCNC: 186 MG/DL (ref 70–99)
SARS-COV-2 RNA RESP QL NAA+PROBE: NEGATIVE

## 2022-11-30 PROCEDURE — 250N000011 HC RX IP 250 OP 636

## 2022-11-30 PROCEDURE — 250N000012 HC RX MED GY IP 250 OP 636 PS 637: Performed by: FAMILY MEDICINE

## 2022-11-30 PROCEDURE — 258N000001 HC RX 258: Performed by: ORTHOPAEDIC SURGERY

## 2022-11-30 PROCEDURE — 250N000011 HC RX IP 250 OP 636: Performed by: ANESTHESIOLOGY

## 2022-11-30 PROCEDURE — 73560 X-RAY EXAM OF KNEE 1 OR 2: CPT | Mod: LT

## 2022-11-30 PROCEDURE — 250N000013 HC RX MED GY IP 250 OP 250 PS 637

## 2022-11-30 PROCEDURE — 250N000009 HC RX 250

## 2022-11-30 PROCEDURE — 258N000003 HC RX IP 258 OP 636: Performed by: NURSE ANESTHETIST, CERTIFIED REGISTERED

## 2022-11-30 PROCEDURE — 250N000025 HC SEVOFLURANE, PER MIN: Performed by: ORTHOPAEDIC SURGERY

## 2022-11-30 PROCEDURE — 250N000009 HC RX 250: Performed by: NURSE ANESTHETIST, CERTIFIED REGISTERED

## 2022-11-30 PROCEDURE — 96372 THER/PROPH/DIAG INJ SC/IM: CPT | Performed by: FAMILY MEDICINE

## 2022-11-30 PROCEDURE — 360N000077 HC SURGERY LEVEL 4, PER MIN: Performed by: ORTHOPAEDIC SURGERY

## 2022-11-30 PROCEDURE — 82962 GLUCOSE BLOOD TEST: CPT

## 2022-11-30 PROCEDURE — 710N000010 HC RECOVERY PHASE 1, LEVEL 2, PER MIN: Performed by: ORTHOPAEDIC SURGERY

## 2022-11-30 PROCEDURE — C1776 JOINT DEVICE (IMPLANTABLE): HCPCS | Performed by: ORTHOPAEDIC SURGERY

## 2022-11-30 PROCEDURE — 250N000011 HC RX IP 250 OP 636: Performed by: NURSE ANESTHETIST, CERTIFIED REGISTERED

## 2022-11-30 PROCEDURE — 999N000065 XR KNEE PORT LEFT 1/2 VIEWS

## 2022-11-30 PROCEDURE — 272N000001 HC OR GENERAL SUPPLY STERILE: Performed by: ORTHOPAEDIC SURGERY

## 2022-11-30 PROCEDURE — 370N000017 HC ANESTHESIA TECHNICAL FEE, PER MIN: Performed by: ORTHOPAEDIC SURGERY

## 2022-11-30 PROCEDURE — 250N000011 HC RX IP 250 OP 636: Performed by: FAMILY MEDICINE

## 2022-11-30 PROCEDURE — 99204 OFFICE O/P NEW MOD 45 MIN: CPT | Performed by: FAMILY MEDICINE

## 2022-11-30 PROCEDURE — 250N000013 HC RX MED GY IP 250 OP 250 PS 637: Performed by: FAMILY MEDICINE

## 2022-11-30 PROCEDURE — 999N000141 HC STATISTIC PRE-PROCEDURE NURSING ASSESSMENT: Performed by: ORTHOPAEDIC SURGERY

## 2022-11-30 PROCEDURE — 258N000003 HC RX IP 258 OP 636: Performed by: ANESTHESIOLOGY

## 2022-11-30 DEVICE — TIBIAL BEARING INSERT - CS
Type: IMPLANTABLE DEVICE | Site: KNEE | Status: FUNCTIONAL
Brand: TRIATHLON

## 2022-11-30 DEVICE — TIBIAL COMPONENT
Type: IMPLANTABLE DEVICE | Site: KNEE | Status: FUNCTIONAL
Brand: TRIATHLON

## 2022-11-30 DEVICE — CRUCIATE RETAINING FEMORAL
Type: IMPLANTABLE DEVICE | Site: KNEE | Status: FUNCTIONAL
Brand: TRIATHLON

## 2022-11-30 RX ORDER — DILTIAZEM HYDROCHLORIDE 30 MG/1
60 TABLET, FILM COATED ORAL 3 TIMES DAILY
Status: DISCONTINUED | OUTPATIENT
Start: 2022-12-01 | End: 2022-12-01 | Stop reason: HOSPADM

## 2022-11-30 RX ORDER — PANTOPRAZOLE SODIUM 20 MG/1
40 TABLET, DELAYED RELEASE ORAL
Status: DISCONTINUED | OUTPATIENT
Start: 2022-12-01 | End: 2022-12-01 | Stop reason: HOSPADM

## 2022-11-30 RX ORDER — SODIUM CHLORIDE, SODIUM LACTATE, POTASSIUM CHLORIDE, CALCIUM CHLORIDE 600; 310; 30; 20 MG/100ML; MG/100ML; MG/100ML; MG/100ML
INJECTION, SOLUTION INTRAVENOUS CONTINUOUS
Status: DISCONTINUED | OUTPATIENT
Start: 2022-11-30 | End: 2022-12-01 | Stop reason: HOSPADM

## 2022-11-30 RX ORDER — NALOXONE HYDROCHLORIDE 0.4 MG/ML
0.2 INJECTION, SOLUTION INTRAMUSCULAR; INTRAVENOUS; SUBCUTANEOUS
Status: DISCONTINUED | OUTPATIENT
Start: 2022-11-30 | End: 2022-12-01 | Stop reason: HOSPADM

## 2022-11-30 RX ORDER — ASPIRIN 81 MG/1
81 TABLET ORAL 2 TIMES DAILY
Qty: 60 TABLET | Refills: 0 | Status: SHIPPED | OUTPATIENT
Start: 2022-11-30

## 2022-11-30 RX ORDER — GABAPENTIN 300 MG/1
900 CAPSULE ORAL 3 TIMES DAILY
Status: DISCONTINUED | OUTPATIENT
Start: 2022-11-30 | End: 2022-12-01 | Stop reason: HOSPADM

## 2022-11-30 RX ORDER — ACETAMINOPHEN 325 MG/1
975 TABLET ORAL ONCE
Status: COMPLETED | OUTPATIENT
Start: 2022-11-30 | End: 2022-11-30

## 2022-11-30 RX ORDER — FLUTICASONE PROPIONATE AND SALMETEROL 100; 50 UG/1; UG/1
1 POWDER RESPIRATORY (INHALATION) EVERY 12 HOURS
Status: DISCONTINUED | OUTPATIENT
Start: 2022-11-30 | End: 2022-12-01 | Stop reason: HOSPADM

## 2022-11-30 RX ORDER — FENTANYL 37.5 UG/H
1 PATCH, EXTENDED RELEASE TRANSDERMAL
Status: ON HOLD | COMMUNITY
End: 2022-12-01

## 2022-11-30 RX ORDER — FENTANYL CITRATE 50 UG/ML
INJECTION, SOLUTION INTRAMUSCULAR; INTRAVENOUS PRN
Status: DISCONTINUED | OUTPATIENT
Start: 2022-11-30 | End: 2022-11-30

## 2022-11-30 RX ORDER — HYDROXYZINE HYDROCHLORIDE 25 MG/1
25 TABLET, FILM COATED ORAL EVERY 6 HOURS PRN
Status: DISCONTINUED | OUTPATIENT
Start: 2022-11-30 | End: 2022-12-01 | Stop reason: HOSPADM

## 2022-11-30 RX ORDER — VENLAFAXINE HYDROCHLORIDE 150 MG/1
150 CAPSULE, EXTENDED RELEASE ORAL DAILY
Status: DISCONTINUED | OUTPATIENT
Start: 2022-12-01 | End: 2022-12-01 | Stop reason: HOSPADM

## 2022-11-30 RX ORDER — ONDANSETRON 2 MG/ML
4 INJECTION INTRAMUSCULAR; INTRAVENOUS EVERY 6 HOURS PRN
Status: DISCONTINUED | OUTPATIENT
Start: 2022-11-30 | End: 2022-12-01 | Stop reason: HOSPADM

## 2022-11-30 RX ORDER — POLYETHYLENE GLYCOL 3350 17 G/17G
17 POWDER, FOR SOLUTION ORAL DAILY
Status: DISCONTINUED | OUTPATIENT
Start: 2022-12-01 | End: 2022-12-01 | Stop reason: HOSPADM

## 2022-11-30 RX ORDER — OXYCODONE HYDROCHLORIDE 5 MG/1
10 TABLET ORAL EVERY 4 HOURS PRN
Status: DISCONTINUED | OUTPATIENT
Start: 2022-11-30 | End: 2022-12-01

## 2022-11-30 RX ORDER — PROCHLORPERAZINE MALEATE 10 MG
10 TABLET ORAL EVERY 6 HOURS PRN
Status: DISCONTINUED | OUTPATIENT
Start: 2022-11-30 | End: 2022-12-01 | Stop reason: HOSPADM

## 2022-11-30 RX ORDER — LIOTHYRONINE SODIUM 5 UG/1
5 TABLET ORAL 2 TIMES DAILY
Status: DISCONTINUED | OUTPATIENT
Start: 2022-11-30 | End: 2022-12-01 | Stop reason: HOSPADM

## 2022-11-30 RX ORDER — FLUTICASONE FUROATE AND VILANTEROL 100; 25 UG/1; UG/1
1 POWDER RESPIRATORY (INHALATION) 2 TIMES DAILY
Status: DISCONTINUED | OUTPATIENT
Start: 2022-12-01 | End: 2022-11-30 | Stop reason: CLARIF

## 2022-11-30 RX ORDER — ONDANSETRON 4 MG/1
4 TABLET, ORALLY DISINTEGRATING ORAL EVERY 6 HOURS PRN
Status: DISCONTINUED | OUTPATIENT
Start: 2022-11-30 | End: 2022-12-01 | Stop reason: HOSPADM

## 2022-11-30 RX ORDER — HYDROMORPHONE HCL IN WATER/PF 6 MG/30 ML
0.4 PATIENT CONTROLLED ANALGESIA SYRINGE INTRAVENOUS EVERY 5 MIN PRN
Status: DISCONTINUED | OUTPATIENT
Start: 2022-11-30 | End: 2022-11-30 | Stop reason: HOSPADM

## 2022-11-30 RX ORDER — METHYLPREDNISOLONE 4 MG/1
4 TABLET ORAL DAILY
Status: DISCONTINUED | OUTPATIENT
Start: 2022-12-01 | End: 2022-12-01 | Stop reason: HOSPADM

## 2022-11-30 RX ORDER — BISACODYL 10 MG
10 SUPPOSITORY, RECTAL RECTAL DAILY
Qty: 12 SUPPOSITORY | Refills: 0 | Status: SHIPPED | OUTPATIENT
Start: 2022-11-30

## 2022-11-30 RX ORDER — DEXAMETHASONE SODIUM PHOSPHATE 4 MG/ML
INJECTION, SOLUTION INTRA-ARTICULAR; INTRALESIONAL; INTRAMUSCULAR; INTRAVENOUS; SOFT TISSUE PRN
Status: DISCONTINUED | OUTPATIENT
Start: 2022-11-30 | End: 2022-11-30

## 2022-11-30 RX ORDER — LIDOCAINE 40 MG/G
CREAM TOPICAL
Status: DISCONTINUED | OUTPATIENT
Start: 2022-11-30 | End: 2022-12-01 | Stop reason: HOSPADM

## 2022-11-30 RX ORDER — CEFAZOLIN SODIUM 2 G/100ML
2 INJECTION, SOLUTION INTRAVENOUS EVERY 8 HOURS
Status: COMPLETED | OUTPATIENT
Start: 2022-11-30 | End: 2022-12-01

## 2022-11-30 RX ORDER — KETAMINE HYDROCHLORIDE 10 MG/ML
INJECTION INTRAMUSCULAR; INTRAVENOUS PRN
Status: DISCONTINUED | OUTPATIENT
Start: 2022-11-30 | End: 2022-11-30

## 2022-11-30 RX ORDER — HYDROMORPHONE HYDROCHLORIDE 1 MG/ML
0.5 INJECTION, SOLUTION INTRAMUSCULAR; INTRAVENOUS; SUBCUTANEOUS
Status: DISCONTINUED | OUTPATIENT
Start: 2022-11-30 | End: 2022-12-01

## 2022-11-30 RX ORDER — LISINOPRIL 5 MG/1
5 TABLET ORAL DAILY
Status: DISCONTINUED | OUTPATIENT
Start: 2022-12-01 | End: 2022-12-01 | Stop reason: HOSPADM

## 2022-11-30 RX ORDER — ACETAMINOPHEN 325 MG/1
975 TABLET ORAL EVERY 8 HOURS
Status: DISCONTINUED | OUTPATIENT
Start: 2022-11-30 | End: 2022-12-01 | Stop reason: HOSPADM

## 2022-11-30 RX ORDER — TRANEXAMIC ACID 650 MG/1
1950 TABLET ORAL ONCE
Status: COMPLETED | OUTPATIENT
Start: 2022-11-30 | End: 2022-11-30

## 2022-11-30 RX ORDER — LEVOTHYROXINE SODIUM 50 UG/1
50 TABLET ORAL DAILY
Status: DISCONTINUED | OUTPATIENT
Start: 2022-12-01 | End: 2022-12-01 | Stop reason: HOSPADM

## 2022-11-30 RX ORDER — FENTANYL CITRATE 50 UG/ML
25 INJECTION, SOLUTION INTRAMUSCULAR; INTRAVENOUS EVERY 5 MIN PRN
Status: DISCONTINUED | OUTPATIENT
Start: 2022-11-30 | End: 2022-11-30 | Stop reason: HOSPADM

## 2022-11-30 RX ORDER — FENTANYL CITRATE 50 UG/ML
100 INJECTION, SOLUTION INTRAMUSCULAR; INTRAVENOUS
Status: COMPLETED | OUTPATIENT
Start: 2022-11-30 | End: 2022-11-30

## 2022-11-30 RX ORDER — METHYLPREDNISOLONE SODIUM SUCCINATE 500 MG/8ML
INJECTION INTRAMUSCULAR; INTRAVENOUS PRN
Status: DISCONTINUED | OUTPATIENT
Start: 2022-11-30 | End: 2022-11-30

## 2022-11-30 RX ORDER — SODIUM CHLORIDE, SODIUM LACTATE, POTASSIUM CHLORIDE, CALCIUM CHLORIDE 600; 310; 30; 20 MG/100ML; MG/100ML; MG/100ML; MG/100ML
INJECTION, SOLUTION INTRAVENOUS CONTINUOUS PRN
Status: DISCONTINUED | OUTPATIENT
Start: 2022-11-30 | End: 2022-11-30

## 2022-11-30 RX ORDER — BISACODYL 10 MG
10 SUPPOSITORY, RECTAL RECTAL DAILY PRN
Status: DISCONTINUED | OUTPATIENT
Start: 2022-11-30 | End: 2022-12-01 | Stop reason: HOSPADM

## 2022-11-30 RX ORDER — LURASIDONE HYDROCHLORIDE 20 MG/1
20 TABLET, FILM COATED ORAL AT BEDTIME
Status: DISCONTINUED | OUTPATIENT
Start: 2022-11-30 | End: 2022-12-01 | Stop reason: HOSPADM

## 2022-11-30 RX ORDER — ALBUTEROL SULFATE 90 UG/1
2 AEROSOL, METERED RESPIRATORY (INHALATION) EVERY 4 HOURS PRN
Status: DISCONTINUED | OUTPATIENT
Start: 2022-11-30 | End: 2022-12-01 | Stop reason: HOSPADM

## 2022-11-30 RX ORDER — NALOXONE HYDROCHLORIDE 0.4 MG/ML
0.4 INJECTION, SOLUTION INTRAMUSCULAR; INTRAVENOUS; SUBCUTANEOUS
Status: DISCONTINUED | OUTPATIENT
Start: 2022-11-30 | End: 2022-12-01 | Stop reason: HOSPADM

## 2022-11-30 RX ORDER — OXYCODONE HYDROCHLORIDE 5 MG/1
15 TABLET ORAL EVERY 4 HOURS PRN
Status: DISCONTINUED | OUTPATIENT
Start: 2022-11-30 | End: 2022-12-01

## 2022-11-30 RX ORDER — DEXTROSE MONOHYDRATE 25 G/50ML
25-50 INJECTION, SOLUTION INTRAVENOUS
Status: DISCONTINUED | OUTPATIENT
Start: 2022-11-30 | End: 2022-12-01 | Stop reason: HOSPADM

## 2022-11-30 RX ORDER — ACETAMINOPHEN 325 MG/1
650 TABLET ORAL EVERY 4 HOURS PRN
Status: DISCONTINUED | OUTPATIENT
Start: 2022-12-03 | End: 2022-12-01 | Stop reason: HOSPADM

## 2022-11-30 RX ORDER — CEFAZOLIN SODIUM/WATER 2 G/20 ML
2 SYRINGE (ML) INTRAVENOUS SEE ADMIN INSTRUCTIONS
Status: DISCONTINUED | OUTPATIENT
Start: 2022-11-30 | End: 2022-11-30 | Stop reason: HOSPADM

## 2022-11-30 RX ORDER — PROPOFOL 10 MG/ML
INJECTION, EMULSION INTRAVENOUS CONTINUOUS PRN
Status: DISCONTINUED | OUTPATIENT
Start: 2022-11-30 | End: 2022-11-30

## 2022-11-30 RX ORDER — OXYCODONE HYDROCHLORIDE 5 MG/1
5-10 TABLET ORAL EVERY 4 HOURS PRN
Qty: 26 TABLET | Refills: 0 | Status: SHIPPED | OUTPATIENT
Start: 2022-11-30 | End: 2022-12-01

## 2022-11-30 RX ORDER — PROPOFOL 10 MG/ML
INJECTION, EMULSION INTRAVENOUS PRN
Status: DISCONTINUED | OUTPATIENT
Start: 2022-11-30 | End: 2022-11-30

## 2022-11-30 RX ORDER — HYDROMORPHONE HYDROCHLORIDE 2 MG/1
4 TABLET ORAL EVERY 6 HOURS PRN
Status: DISCONTINUED | OUTPATIENT
Start: 2022-11-30 | End: 2022-12-01

## 2022-11-30 RX ORDER — ONDANSETRON 2 MG/ML
4 INJECTION INTRAMUSCULAR; INTRAVENOUS EVERY 30 MIN PRN
Status: DISCONTINUED | OUTPATIENT
Start: 2022-11-30 | End: 2022-11-30 | Stop reason: HOSPADM

## 2022-11-30 RX ORDER — HYDROMORPHONE HCL IN WATER/PF 6 MG/30 ML
0.2 PATIENT CONTROLLED ANALGESIA SYRINGE INTRAVENOUS
Status: DISCONTINUED | OUTPATIENT
Start: 2022-11-30 | End: 2022-12-01 | Stop reason: HOSPADM

## 2022-11-30 RX ORDER — POLYETHYLENE GLYCOL 3350 17 G/17G
1 POWDER, FOR SOLUTION ORAL DAILY
Qty: 7 PACKET | Refills: 0 | Status: SHIPPED | OUTPATIENT
Start: 2022-11-30

## 2022-11-30 RX ORDER — NICOTINE POLACRILEX 4 MG
15-30 LOZENGE BUCCAL
Status: DISCONTINUED | OUTPATIENT
Start: 2022-11-30 | End: 2022-12-01 | Stop reason: HOSPADM

## 2022-11-30 RX ORDER — AMOXICILLIN 250 MG
1 CAPSULE ORAL 2 TIMES DAILY
Status: DISCONTINUED | OUTPATIENT
Start: 2022-11-30 | End: 2022-12-01 | Stop reason: HOSPADM

## 2022-11-30 RX ORDER — AMOXICILLIN 250 MG
1-2 CAPSULE ORAL 2 TIMES DAILY
Qty: 30 TABLET | Refills: 0 | Status: SHIPPED | OUTPATIENT
Start: 2022-11-30

## 2022-11-30 RX ORDER — HYDROMORPHONE HCL IN WATER/PF 6 MG/30 ML
0.2 PATIENT CONTROLLED ANALGESIA SYRINGE INTRAVENOUS EVERY 5 MIN PRN
Status: DISCONTINUED | OUTPATIENT
Start: 2022-11-30 | End: 2022-11-30 | Stop reason: HOSPADM

## 2022-11-30 RX ORDER — CEFAZOLIN SODIUM/WATER 2 G/20 ML
2 SYRINGE (ML) INTRAVENOUS
Status: COMPLETED | OUTPATIENT
Start: 2022-11-30 | End: 2022-11-30

## 2022-11-30 RX ORDER — ONDANSETRON 2 MG/ML
INJECTION INTRAMUSCULAR; INTRAVENOUS PRN
Status: DISCONTINUED | OUTPATIENT
Start: 2022-11-30 | End: 2022-11-30

## 2022-11-30 RX ORDER — ACETAMINOPHEN 325 MG/1
650 TABLET ORAL EVERY 4 HOURS PRN
Qty: 100 TABLET | Refills: 0 | Status: SHIPPED | OUTPATIENT
Start: 2022-11-30

## 2022-11-30 RX ORDER — BUPROPION HYDROCHLORIDE 300 MG/1
300 TABLET ORAL EVERY MORNING
Status: DISCONTINUED | OUTPATIENT
Start: 2022-12-01 | End: 2022-12-01 | Stop reason: HOSPADM

## 2022-11-30 RX ORDER — SODIUM CHLORIDE, SODIUM LACTATE, POTASSIUM CHLORIDE, CALCIUM CHLORIDE 600; 310; 30; 20 MG/100ML; MG/100ML; MG/100ML; MG/100ML
INJECTION, SOLUTION INTRAVENOUS CONTINUOUS
Status: DISCONTINUED | OUTPATIENT
Start: 2022-11-30 | End: 2022-11-30 | Stop reason: HOSPADM

## 2022-11-30 RX ORDER — PRAVASTATIN SODIUM 20 MG
80 TABLET ORAL AT BEDTIME
Status: DISCONTINUED | OUTPATIENT
Start: 2022-11-30 | End: 2022-12-01 | Stop reason: HOSPADM

## 2022-11-30 RX ORDER — ASPIRIN 81 MG/1
81 TABLET ORAL 2 TIMES DAILY
Status: DISCONTINUED | OUTPATIENT
Start: 2022-11-30 | End: 2022-12-01 | Stop reason: HOSPADM

## 2022-11-30 RX ORDER — DOXEPIN HYDROCHLORIDE 25 MG/1
25-50 CAPSULE ORAL
COMMUNITY

## 2022-11-30 RX ORDER — CYCLOSPORINE 0.5 MG/ML
1 EMULSION OPHTHALMIC 2 TIMES DAILY
Status: DISCONTINUED | OUTPATIENT
Start: 2022-11-30 | End: 2022-12-01 | Stop reason: HOSPADM

## 2022-11-30 RX ORDER — FENTANYL CITRATE 50 UG/ML
50 INJECTION, SOLUTION INTRAMUSCULAR; INTRAVENOUS EVERY 5 MIN PRN
Status: DISCONTINUED | OUTPATIENT
Start: 2022-11-30 | End: 2022-11-30 | Stop reason: HOSPADM

## 2022-11-30 RX ORDER — LIDOCAINE 40 MG/G
CREAM TOPICAL
Status: DISCONTINUED | OUTPATIENT
Start: 2022-11-30 | End: 2022-11-30 | Stop reason: HOSPADM

## 2022-11-30 RX ORDER — VIBEGRON 75 MG/1
75 TABLET, FILM COATED ORAL DAILY
COMMUNITY

## 2022-11-30 RX ORDER — LEVOTHYROXINE SODIUM 50 UG/1
50 TABLET ORAL DAILY
COMMUNITY

## 2022-11-30 RX ORDER — ONDANSETRON 4 MG/1
4 TABLET, ORALLY DISINTEGRATING ORAL EVERY 30 MIN PRN
Status: DISCONTINUED | OUTPATIENT
Start: 2022-11-30 | End: 2022-11-30 | Stop reason: HOSPADM

## 2022-11-30 RX ADMIN — PROPOFOL 10 MG: 10 INJECTION, EMULSION INTRAVENOUS at 14:39

## 2022-11-30 RX ADMIN — SODIUM CHLORIDE, POTASSIUM CHLORIDE, SODIUM LACTATE AND CALCIUM CHLORIDE: 600; 310; 30; 20 INJECTION, SOLUTION INTRAVENOUS at 16:08

## 2022-11-30 RX ADMIN — FENTANYL CITRATE 50 MCG: 50 INJECTION INTRAMUSCULAR; INTRAVENOUS at 16:39

## 2022-11-30 RX ADMIN — Medication 2 G: at 14:32

## 2022-11-30 RX ADMIN — ASPIRIN 81 MG: 81 TABLET, COATED ORAL at 20:28

## 2022-11-30 RX ADMIN — INSULIN GLARGINE 15 UNITS: 100 INJECTION, SOLUTION SUBCUTANEOUS at 23:04

## 2022-11-30 RX ADMIN — DEXAMETHASONE SODIUM PHOSPHATE 4 MG: 4 INJECTION, SOLUTION INTRA-ARTICULAR; INTRALESIONAL; INTRAMUSCULAR; INTRAVENOUS; SOFT TISSUE at 14:55

## 2022-11-30 RX ADMIN — FENTANYL CITRATE 100 MCG: 50 INJECTION, SOLUTION INTRAMUSCULAR; INTRAVENOUS at 15:16

## 2022-11-30 RX ADMIN — MIDAZOLAM HYDROCHLORIDE 2 MG: 1 INJECTION, SOLUTION INTRAMUSCULAR; INTRAVENOUS at 13:57

## 2022-11-30 RX ADMIN — PROPOFOL 10 MG: 10 INJECTION, EMULSION INTRAVENOUS at 14:44

## 2022-11-30 RX ADMIN — SODIUM CHLORIDE, POTASSIUM CHLORIDE, SODIUM LACTATE AND CALCIUM CHLORIDE: 600; 310; 30; 20 INJECTION, SOLUTION INTRAVENOUS at 14:35

## 2022-11-30 RX ADMIN — METHYLPREDNISOLONE SODIUM SUCCINATE 125 MG: 500 INJECTION, POWDER, FOR SOLUTION INTRAMUSCULAR; INTRAVENOUS at 14:43

## 2022-11-30 RX ADMIN — LURASIDONE HYDROCHLORIDE 20 MG: 20 TABLET, FILM COATED ORAL at 23:03

## 2022-11-30 RX ADMIN — Medication 1 TABLET: at 20:28

## 2022-11-30 RX ADMIN — CYCLOSPORINE 1 DROP: 0.5 EMULSION OPHTHALMIC at 20:31

## 2022-11-30 RX ADMIN — ROCURONIUM BROMIDE 30 MG: 10 INJECTION, SOLUTION INTRAVENOUS at 15:02

## 2022-11-30 RX ADMIN — PRAVASTATIN SODIUM 80 MG: 20 TABLET ORAL at 23:03

## 2022-11-30 RX ADMIN — SODIUM CHLORIDE, POTASSIUM CHLORIDE, SODIUM LACTATE AND CALCIUM CHLORIDE: 600; 310; 30; 20 INJECTION, SOLUTION INTRAVENOUS at 13:41

## 2022-11-30 RX ADMIN — ACETAMINOPHEN 975 MG: 325 TABLET ORAL at 12:50

## 2022-11-30 RX ADMIN — FENTANYL CITRATE 50 MCG: 50 INJECTION INTRAMUSCULAR; INTRAVENOUS at 16:33

## 2022-11-30 RX ADMIN — HYDROCORTISONE SODIUM SUCCINATE 60 MG: 100 INJECTION, POWDER, FOR SOLUTION INTRAMUSCULAR; INTRAVENOUS at 20:29

## 2022-11-30 RX ADMIN — CEFAZOLIN SODIUM 2 G: 2 INJECTION, SOLUTION INTRAVENOUS at 22:50

## 2022-11-30 RX ADMIN — HYDROMORPHONE HYDROCHLORIDE 0.4 MG: 0.2 INJECTION, SOLUTION INTRAMUSCULAR; INTRAVENOUS; SUBCUTANEOUS at 17:18

## 2022-11-30 RX ADMIN — GABAPENTIN 900 MG: 300 CAPSULE ORAL at 20:28

## 2022-11-30 RX ADMIN — TRANEXAMIC ACID 1950 MG: 650 TABLET ORAL at 12:50

## 2022-11-30 RX ADMIN — HYDROXYZINE HYDROCHLORIDE 25 MG: 25 TABLET, FILM COATED ORAL at 16:33

## 2022-11-30 RX ADMIN — Medication 100 MG: at 14:50

## 2022-11-30 RX ADMIN — PROPOFOL 20 MG: 10 INJECTION, EMULSION INTRAVENOUS at 14:37

## 2022-11-30 RX ADMIN — ACETAMINOPHEN 975 MG: 325 TABLET ORAL at 20:57

## 2022-11-30 RX ADMIN — SUGAMMADEX 200 MG: 100 INJECTION, SOLUTION INTRAVENOUS at 15:48

## 2022-11-30 RX ADMIN — KETAMINE HYDROCHLORIDE 50 MG: 10 INJECTION, SOLUTION INTRAMUSCULAR; INTRAVENOUS at 14:50

## 2022-11-30 RX ADMIN — SENNOSIDES AND DOCUSATE SODIUM 1 TABLET: 50; 8.6 TABLET ORAL at 20:28

## 2022-11-30 RX ADMIN — FENTANYL CITRATE 100 MCG: 50 INJECTION INTRAMUSCULAR; INTRAVENOUS at 13:57

## 2022-11-30 RX ADMIN — OXYCODONE HYDROCHLORIDE 10 MG: 5 TABLET ORAL at 23:03

## 2022-11-30 RX ADMIN — ONDANSETRON 4 MG: 2 INJECTION INTRAMUSCULAR; INTRAVENOUS at 15:20

## 2022-11-30 RX ADMIN — LIOTHYRONINE SODIUM 5 MCG: 5 TABLET ORAL at 20:31

## 2022-11-30 RX ADMIN — PROPOFOL 50 MCG/KG/MIN: 10 INJECTION, EMULSION INTRAVENOUS at 14:50

## 2022-11-30 RX ADMIN — OXYCODONE HYDROCHLORIDE 10 MG: 5 TABLET ORAL at 17:26

## 2022-11-30 RX ADMIN — HYDROMORPHONE HYDROCHLORIDE 0.5 MG: 1 INJECTION, SOLUTION INTRAMUSCULAR; INTRAVENOUS; SUBCUTANEOUS at 14:59

## 2022-11-30 RX ADMIN — HYDROMORPHONE HYDROCHLORIDE 0.5 MG: 1 INJECTION, SOLUTION INTRAMUSCULAR; INTRAVENOUS; SUBCUTANEOUS at 14:54

## 2022-11-30 ASSESSMENT — ACTIVITIES OF DAILY LIVING (ADL)
ADLS_ACUITY_SCORE: 22
ADLS_ACUITY_SCORE: 20
ADLS_ACUITY_SCORE: 22

## 2022-11-30 NOTE — ANESTHESIA PROCEDURE NOTES
Airway         Procedure Start/Stop Times: 11/30/2022 2:52 PM  Staff -        CRNA: Milvia Jaimes APRN CRNA       Performed By: CRNAIndications and Patient Condition       Indications for airway management: crystal-procedural and airway protection         Mask difficulty assessment: 0 - not attempted    Final Airway Details       Final airway type: endotracheal airway       Successful airway: ETT - single  Endotracheal Airway Details        ETT size (mm): 7.0       Cuffed: yes       Successful intubation technique: video laryngoscopy       VL Blade Size: Glidescope 3       Grade View of Cords: 1       Adjucts: stylet       Position: Right       Measured from: lips       Secured at (cm): 22       Bite block used: None    Post intubation assessment        Placement verified by: capnometry, equal breath sounds and chest rise        Number of attempts at approach: 1       Number of other approaches attempted: 0       Secured with: silk tape       Ease of procedure: easy       Dentition: Intact    Medication(s) Administered   Medication Administration Time: 11/30/2022 2:52 PM

## 2022-11-30 NOTE — TREATMENT PLAN
Orthopedic Surgery Pre-Op Plan: Swathi Gibbons  pre-op review. This is NOT an H&P   Surgeon: Dr. Correa    [facility]: [facility]  Name of Surgery: Left Total Knee Arthroplasty   Date of Surgery: 11/30/22  H&P: Completed on 11/22/22 by Dr. Salima Bran at [facility].   History of ASA, NSAIDS, vitamin and/or herbal supplements within 10 days: Yes- ASA 81 mg daily (held for surgery since 11/14/22), Fish Oil, Multivitamin-patient instructed to hold these supplements/vitamins for 7 days before surgery.  History of blood thinners: Yes- has strong family history of blood clots and personal history of a superficial clot. Saw Hematologist and they recommended that patient be put on anticoagulation after procedures. Not currently on any anticoagulation.     Plan:   1) Discharge Plan: Home POD 1 or POD 2 when medically stable for discharge with assist of Adult Children. Please see Discharge Planning section near bottom of this note for further details.     2) Hyperlipidemia: On pravastatin.    3) Hypertension: Well-controlled on diltiazem and lisinopril.  Patient instructed to hold lisinopril on the day of surgery but should continue taking diltiazem.    4) Psoriatic Arthritis: Follows with Rheumatology. On leflunomide (Arava), ixekizumab (Taltz) and methylprednisolone.  Will hold Arava and Taltz for surgery as recommended by her rheumatologist.  Has been on long-term daily steroids (4 mg of methylprednisolone). Per PCP she will require stress dose steroid dosing perioperatively. I will alert Anesthesia and Hospitalist teams of this and defer to them for management of this.    5) Asthma: Uncomplicated: Stable.  Denies recent exacerbations.  On Advair and albuterol inhaler as needed.    6) Obstructive Sleep Apnea: on CPAP.  Patient reminded to bring CPAP machine to the hospital and use it whenever sleeping or napping.    7) Type 2 Diabetes Mellitus: Good recent control. Hemoglobin A1c 5.7 on  11/22/2022.  On semaglutide (Ozempic). I recommend blood glucose checks at least three times a day and at bedtime during hospital stay. Goal BG < 180 to decrease risk for infection and wound healing complications. Nursing to please notify Hospitalist if BG > 180.      8) Morbid Obesity: BMI 43.9, Wt: 248 lbs per recent assessment at [facility] on 11/18/22. Patient will continue to follow with weight loss clinic and is being considered for possible weight loss surgery in the future. Working with a registered dietician. On phentermine (Adipex) for weight loss. Patient was instructed to hold phentermine for 7 days before surgery.     9) Chronic Pain Syndrome: follows with [facility]. On fentanyl patch and PO dilaudid.  I will order inpatient pain team consult to assist with postop pain management plan given this history.  Their recommendations and assistance are greatly appreciated.     10) History of Alcohol Use Disorder: in sustained remission. Sober since 2014.     11) Hypothyroidism: On liothyronine.    12) Anxiety and Depression: On venlafaxine, bupropion and Latuda.     Patient has complex medical history above but appears medically optimized for upcoming surgery. I would recommend Hospitalist Consult to assist with medical management.  I will also order Inpatient Pain Team Consult to assist with post op pain management plan given history of chronic pain on chronic opioids. Please call me below with any questions on this patient.       Review of Systems Notable for: Hyperlipidemia, hypertension, psoriatic arthritis, asthma, obstructive sleep apnea-on CPAP, type 2 diabetes mellitus-good control, morbid obesity, chronic pain syndrome, history of alcohol use disorder-in sustained remission, hypothyroidism, anxiety, depression.    Past Medical History:   Past Medical History:   Diagnosis Date     Alcohol use disorder, severe, in sustained remission (H)      Alcoholism (H)       Anxiety and depression      Arthritis     PSORIATIC     Asthma      Back pain      Chronic pain      Chronic rhinitis      Chronic rhinitis      COPD (chronic obstructive pulmonary disease) (H)      Depression      Diabetes (H)     Type II     Esophageal dysmotility     Tortuous esophagus     EtOH dependence (H)      Gastroesophageal reflux disease      History of blood clots     superficial blood clot - has seen Heme d/t family hx of DVT's     HTN (hypertension)      Hyperlipidemia      Hypertension      Hypothyroidism      Hypothyroidism      Latent tuberculosis      MRSA (methicillin resistant staph aureus) culture positive      Nodular goiter      NABOR (obstructive sleep apnea)     C-PAP     Other chronic pain     back pain     Psoriasis      Psoriatic arthritis (H)      Renal disease      Secondary hyperparathyroidism (H)      Sleep apnea      Uncomplicated asthma      Vitamin D deficiency      Past Surgical History:   Procedure Laterality Date     ANKLE ARTHROPLASTY Left     pins     BACK SURGERY      L4-5 Decompression and Fusion     BLADDER SURGERY       BREAST SURGERY      Right breast biopsy, benign     BUNIONECTOMY Bilateral       SECTION       COMBINED CYSTOSCOPY, INSERT STENT URETER(S) Left 2021    Procedure: CYSTOSCOPY, WITH URETERAL STENT INSERTION;  Surgeon: Luis Antonio Haynes MD;  Location: [facility] OR     DILATION AND CURETTAGE  ,      ENT SURGERY      Tonsillectomy     EYE SURGERY      Lasik     GI SURGERY      Bladder Suspension     GYN SURGERY       Section     GYN SURGERY      Tubal Ligation     INSERT STIMULATOR DORSAL COLUMN N/A 2019    Procedure: INSERTION, SPINAL CORD STIMULATOR, DORSAL COLUMN (MEDTRONIC);  Surgeon: Reginald Pandey MD;  Location: [facility] OR     INSERT STIMULATOR DORSAL COLUMN TRIAL N/A 2019    Procedure: SPINAL CORD STIMULATOR  TRIAL;  Surgeon: Reginald Pandey MD;  Location: [facility] OR     LASER HOLMIUM LITHOTRIPSY URETER(S), INSERT STENT,  COMBINED Left 5/4/2021    Procedure: CYSTOURETEROSCOPY, WITH  RETROGRADE PYELOGRAMS, INTERPRETATION OF FLUOROSCOPIC IMAGES, STONE BASKET EXTRACTION, LITHOTRIPSY USING LASER AND LEFT URETERAL STENT REMOVAL AND INSERTION;  Surgeon: Luis Antonio Haynes MD;  Location:  OR     ORTHOPEDIC SURGERY      ORIF Ankle Left 2017     ORTHOPEDIC SURGERY      Bunionectomy Nolan.     OTHER SURGICAL HISTORY      transvaginal     ZZC RECONSTR TOTAL SHOULDER IMPLANT Left 3/16/2021    Procedure: LEFT REVERSE TOTAL SHOULDER ARTHROPLASTY;  Surgeon: Han Blandon MD;  Location: [facility];  Service: Orthopedics       Current Medications:  Patient's Medications   New Prescriptions    No medications on file   Previous Medications    ACETAMINOPHEN (TYLENOL) 325 MG TABLET    Take 325-650 mg by mouth every 4 hours as needed for mild pain    ALBUTEROL (PROAIR HFA/PROVENTIL HFA/VENTOLIN HFA) 108 (90 BASE) MCG/ACT INHALER    Inhale 2 puffs into the lungs every 4 hours as needed for shortness of breath / dyspnea or wheezing (VENTOLIN)    ASPIRIN (ASA) 81 MG CHEWABLE TABLET    Take 81 mg by mouth daily    BUPROPION (WELLBUTRIN XL) 300 MG 24 HR TABLET    Take 300 mg by mouth every morning     CALCITONIN, SALMON, (MIACALCIN) 200 UNIT/ACT NASAL SPRAY    Spray 1 spray into one nostril alternating nostrils daily Alternate nostril each day.    CALCIUM CARBONATE-VITAMIN D (OSCAL) 500-5 MG-MCG TABLET    Take 1 tablet by mouth 2 times daily    CYCLOSPORINE (RESTASIS) 0.05 % OPHTHALMIC EMULSION    Place 1 drop into both eyes 2 times daily    DILTIAZEM (CARDIZEM) 60 MG TABLET    Take 60 mg by mouth 3 times daily    FENTANYL (DURAGESIC) 75 MCG/HR 72 HR PATCH    Place 0.5 patches onto the skin every 72 hours 37.5 mcg dose.  remove old patch.    FERROUS SULFATE (FEROSUL) 325 (65 FE) MG TABLET    Take 325 mg by mouth daily (with breakfast)    FISH OIL-OMEGA-3 FATTY ACIDS 1000 MG CAPSULE    Take 2 g by mouth 2 times daily Stopped 11/21/22 before surgery.     FLUCONAZOLE (DIFLUCAN) 200 MG TABLET    Take 200 mg by mouth daily as needed    FLUTICASONE (FLONASE) 50 MCG/ACT NASAL SPRAY    Spray 2 sprays into both nostrils daily     FLUTICASONE-SALMETEROL (ADVAIR) 100-50 MCG/DOSE INHALER    Inhale 1 puff into the lungs every 12 hours    GABAPENTIN (NEURONTIN) 300 MG CAPSULE    Take 900 mg by mouth 3 times daily (300MG X 3 = 900MG)    HYDROMORPHONE (DILAUDID) 4 MG TABLET    Take 4 mg by mouth every 6 hours as needed for severe pain (7-10)    IMIQUIMOD (ALDARA) 5 % EXTERNAL CREAM    Apply topically nightly as needed    IXEKIZUMAB (TALTZ) 80 MG/ML SOAJ AUTO-INJECTOR    Inject 1 mL (80 mg) Subcutaneous every 28 days    LEFLUNOMIDE (ARAVA) 20 MG TABLET    Take 20 mg by mouth every evening Stopped 11/16/22 before surgery.    LIOTHYRONINE (CYTOMEL) 5 MCG TABLET    Take 1 tablet (5 mcg) by mouth daily    LISINOPRIL (ZESTRIL) 5 MG TABLET    Take 1 tablet (5 mg) by mouth daily    LURASIDONE (LATUDA) 20 MG TABS TABLET    Take 20 mg by mouth At Bedtime     METHYLPREDNISOLONE (MEDROL DOSEPAK) 4 MG TABLET THERAPY PACK    Takes 4 mg daily    MULTIVITAMIN, THERAPEUTIC (THERA-VIT) TABS TABLET    Take 1 tablet by mouth daily Stopped 11/21/22 before surgery.    NALOXONE (NARCAN) 4 MG/0.1ML NASAL SPRAY    Spray 4 mg into one nostril alternating nostrils once as needed for opioid reversal every 2-3 minutes until assistance arrives    NYSTATIN (MYCOSTATIN) 225915 UNIT/GM EXTERNAL CREAM    Apply topically 2 times daily as needed for dry skin     NYSTATIN (MYCOSTATIN) 651906 UNIT/GM EXTERNAL POWDER    Apply topically 4 times daily as needed Under abdominal wall     OMEPRAZOLE (PRILOSEC) 20 MG DR CAPSULE    Take 20 mg by mouth 2 times daily     PHENTERMINE (ADIPEX-P) 37.5 MG TABLET    Take half tablet for 7-10 days in the morning.  If tolerating without problems, can increase to one tablet daily  Stopped 11/21/22 before surgery.    POLYETHYLENE GLYCOL (MIRALAX) 17 G PACKET    Take 1 packet by  mouth daily as needed     POLYVINYL ALCOHOL (LIQUIFILM TEARS) 1.4 % OPHTHALMIC SOLUTION    Place 1 drop into both eyes 3 times daily as needed for dry eyes    PRAVASTATIN (PRAVACHOL) 80 MG TABLET    Take 80 mg by mouth At Bedtime    SEMAGLUTIDE, 2 MG/DOSE, (OZEMPIC, 2 MG/DOSE,) 8 MG/3ML SOPN    Inject 2 mg Subcutaneous once a week    VENLAFAXINE (EFFEXOR-XR) 150 MG 24 HR CAPSULE    Take 150 mg by mouth daily    VITAMIN D2 (ERGOCALCIFEROL) 72906 UNITS (1250 MCG) CAPSULE    Take 50,000 Units by mouth four times a week     XYLITOL 500 MG DISK    Take 1 Dose by mouth every evening as needed   Modified Medications    No medications on file   Discontinued Medications    FENTANYL (DURAGESIC) 12 MCG/HR 72 HR PATCH    Place 1 patch onto the skin every 72 hours remove old patch.    FENTANYL (DURAGESIC) 25 MCG/HR 72 HR PATCH    Place 1 patch onto the skin every 72 hours remove old patch.       ALLERGIES:  Allergies   Allergen Reactions     Avelox [Moxifloxacin] Anaphylaxis     Requiring epinephrine     Cefaclor Hives     Tolerated cephalexin, tolerated cefepime     Sulfa Drugs Hives       Social History  Social History     Tobacco Use     Smoking status: Former     Packs/day: 0.50     Types: Cigarettes     Quit date: 2018     Years since quittin.8     Smokeless tobacco: Never   Substance Use Topics     Alcohol use: Not Currently     Comment: sober for 6 years     Drug use: No       Any Abnormal Recent Diagnostics? Yes  Hemoglobin A1c 5.7 on 2022: Shows good recent control of type 2 diabetes on Ozempic.  We will monitor BG's closely during hospital stay.  Goal BG <180.    Discharge Planning:   Discharge plan according to [facility]:        10/27/22 1430   Discharge Planning   Patient/Family Anticipates Transition to home   Concerns to be Addressed all concerns addressed in this encounter   Living Arrangements   People in Home child(tammy), adult   Type of Residence Private Residence   Is your private  residence a single family home or apartment? Apartment   Number of Stairs, Within Home, Primary none   Stair Railings, Within Home, Primary none   Once home, are you able to live on one level? Yes   Which level? Main Level   Bathroom Shower/Tub Tub/Shower unit   Equipment Currently Used at Home walker, rolling;cane, straight;dressing device;raised toilet seat;shower chair   Support System   Support Systems Children   Do you have someone available to stay with you one or two nights once you are home? Yes       JIMMIE Gamino, CNP   Advanced Practice Nurse Navigator- Orthopedics  [facility]   Phone: [phone]

## 2022-11-30 NOTE — CONSULTS
[facility] MEDICINE CONSULT NOTE   Physician requesting consult: Deandre Correa MD    Reason for consult: Postoperative medical management of medical co-morbidities as below    Assessment and Plan    Swathi Gibbons is a 57 year old   female with history of persistent asthma, former tobacco user, hypertension, DM2, hypothyroidism, dyslipidemia, peptic ulcer disease, GERD, psoriasis, psoriatic arthritis, on immunosuppressants and chronic steroid, superficial thrombophlebitis, depression, anxiety, underwent total knee arthroplasty.  EBL 50 mL.  Left knee pain is stable.[facility] [place] service was asked to evaluate patient for postoperative medical management as follows below. Please resume the home medications as reconciled and further noted below with ordered hold parameters. Vital signs have been stable post-operatively including hemodynamically stable blood pressure and heart rate. Thank you for this consult; we will continue to follow this patient until discharge.    Mild persistent asthma  Stable and asymptomatic  Albuterol inhaler as needed  Advair 100-50 mcg 1 puff twice a day    Essential hypertension  Diltiazem 60 mg 3 times a day  Lisinopril 5 mg daily    DM2  Steroid-induced hyperglycemia  Insulin sliding scale with meals  On Ozempic 2 mg once a week  Lantus 15 unit x 1 while on IV hydrocortisone    Dyslipidemia  Pravastatin 80 mg daily    Hypothyroidism  Levothyroxine 50 mcg daily  Liothyronine 5 mcg twice a day    GERD  Peptic ulcer disease  Resume PPI    Psoriatic arthritis  Chronic immunosuppression  On Arava 20 mg daily, on hold  Ixekizumab infusion every 4 weeks  Methylprednisone 4 mg daily  IV hydrocortisone 60 mg every 6 hours x 4 doses for prevention of adrenal insufficiency    Depression and anxiety  Effexor  mg daily  Latuda 20 mg daily  Wellbutrin  mg daily    Morbid obesity  Modification of lifestyle for weight loss    Chronic back pain  On Dilaudid 4 mg every 6  hours as needed  Gabapentin 900 mg 3 times a day    Obstructive sleep apnea  Compliant with CPAP use    S/p left total knee arthroplasty  Resume routine postoperative  Care and Occupational Therapy  Use incentive spirometry frequently  DVT prophylaxis per orthopedics, aspirin 81 mg twice a day  Pain control with Tylenol 975 mg every 8 hours, 650 mg every 4 hours as needed, oxycodone 5 to 10 mg every 4 hours as needed, IV Dilaudid 0.2 to 0.4 mg every 2 hours as needed  On chronically p.o. Dilaudid 4 mg every 6 hours as needed        -Reviewed the patient's preoperative H and P and updated missing elements.  -Home medication reconciliation has been reviewed.  Medications have been ordered as noted from the home list and changes are documented above     HISTORY     Swathi Gibbons is a 57 year old  female  history of persistent asthma, former tobacco user, hypertension, DM2, hypothyroidism, dyslipidemia, peptic ulcer disease, GERD, psoriasis, psoriatic arthritis, on immunosuppressants and chronic steroid, obstructive sleep apnea, superficial thrombophlebitis, depression, anxiety, underwent total knee arthroplasty.  EBL 50 mL.  Left knee pain is stable.  Has chronic low back pain.  On Dilaudid 4 mg every 6 hours as needed and gabapentin 900 mg 3 times a day at home.  Has mild persistent asthma which is stable and asymptomatic.  On albuterol inhaler and Advair at home.  On diltiazem 60 mg 3 times a day, lisinopril 5 mg daily for hypertension.  Blood pressure is stable.  On Ozempic 2 mg once a week for DM2.  Has psoriasis, on chronic prednisone, Arava and Ixekizumab infusion.  Started on IV hydrocortisone for 4 doses for prevention of's stress-induced adrenal insufficiency.  On levothyroxine 50 mcg daily, liothyronine 5 mcg twice a day for hypothyroidism.  On pravastatin 80 mg daily for dyslipidemia.  Has depression and anxiety.  On Effexor  mg daily, Latuda 20 mg daily, Wellbutrin  mg daily.  She is  compliant with CPAP use.  Does not have history of heart disease, stroke.  Preop physical is reviewed.  History is provided by the patient    Past Medical History     Patient Active Problem List    Diagnosis Date Noted     NABOR (obstructive sleep apnea) 11/18/2022     Priority: Medium     Hyperlipidemia 08/08/2022     Priority: Medium     Difficulty walking 02/07/2022     Priority: Medium     S/P lumbar fusion 12/07/2021     Priority: Medium     BPPV (benign paroxysmal positional vertigo), unspecified laterality 07/16/2021     Priority: Medium     Morbid obesity (H) 05/13/2021     Priority: Medium     Left ureteral stone 04/13/2021     Priority: Medium     Nephrolithiasis 04/12/2021     Priority: Medium     Added automatically from request for surgery [id number]       S/P reverse total shoulder arthroplasty, left 03/16/2021     Priority: Medium     Benign essential tremor 05/02/2019     Priority: Medium     Overactive bladder 05/02/2019     Priority: Medium     Chronic rhinitis      Priority: Medium     Psoriasis      Priority: Medium     Secondary hyperparathyroidism (H)      Priority: Medium     MRSA (methicillin resistant staph aureus) culture positive      Priority: Medium     Esophageal dysmotility      Priority: Medium     Formatting of this note might be different from the original.  EGJ outflow obstruction       Chronic, continuous use of opioids 02/22/2019     Priority: Medium     Diabetes mellitus without complication (H) 02/22/2019     Priority: Medium     Chronic low back pain 06/25/2018     Priority: Medium     Fibromyalgia 09/01/2017     Priority: Medium     Spondylolisthesis, acquired 06/29/2017     Priority: Medium     Osteoarthritis of both knees 03/03/2017     Priority: Medium     Alcohol use disorder, severe, in sustained remission (H) 05/19/2016     Priority: Medium     Hypothyroidism (acquired) 01/28/2016     Priority: Medium     Latent tuberculosis 09/02/2015     Priority: Medium     Formatting of  this note might be different from the original.  Completed 9 months of INH therapy [id number]       Essential hypertension 2014     Priority: Medium     Psoriatic arthritis (H) 2013     Priority: Medium     Nodular goiter 2011     Priority: Medium     Vitamin D deficiency 2011     Priority: Medium     Major depression 2009     Priority: Medium     History of tobacco use disorder 2008     Priority: Medium     Formatting of this note might be different from the original.  Tobacco Abuse       Mild persistent asthma 03/15/2007     Priority: Medium     Anxiety disorder 10/05/2006     Priority: Medium     Formatting of this note might be different from the original.  Anxiety Disorder Generalized       History of peptic ulcer 10/05/2006     Priority: Medium     Formatting of this note might be different from the original.  LW Modifier:  several years ago  ; Ulcer Peptic          Surgical History   She  has a past surgical history that includes back surgery; GI surgery; Breast surgery; orthopedic surgery; orthopedic surgery; Eye surgery; GYN surgery; GYN surgery; ENT surgery; Insert stimulator dorsal column trial (N/A, 2019); Insert stimulator dorsal column (N/A, 2019); Combined Cystoscopy, Insert Stent Ureter(s) (Left, 2021); Laser holmium lithotripsy ureter(s), insert stent, combined (Left, 2021); Ankle Arthroplasty (Left, ); Dilation and curettage (, ); Bunionectomy (Bilateral); other surgical history; Bladder surgery;  Section (); and RECONSTR TOTAL SHOULDER IMPLANT (Left, 3/16/2021).     Past Surgical History:   Procedure Laterality Date     ANKLE ARTHROPLASTY Left     pins     BACK SURGERY      L4-5 Decompression and Fusion     BLADDER SURGERY       BREAST SURGERY      Right breast biopsy, benign     BUNIONECTOMY Bilateral       SECTION       COMBINED CYSTOSCOPY, INSERT STENT URETER(S) Left 2021    Procedure: CYSTOSCOPY,  WITH URETERAL STENT INSERTION;  Surgeon: Luis Antonio Haynes MD;  Location: [facility] OR     DILATION AND CURETTAGE  ,      ENT SURGERY      Tonsillectomy     EYE SURGERY      Lasik     GI SURGERY      Bladder Suspension     GYN SURGERY       Section     GYN SURGERY      Tubal Ligation     INSERT STIMULATOR DORSAL COLUMN N/A 2019    Procedure: INSERTION, SPINAL CORD STIMULATOR, DORSAL COLUMN (MEDTRONIC);  Surgeon: Reginald Pandey MD;  Location: [facility] OR     INSERT STIMULATOR DORSAL COLUMN TRIAL N/A 2019    Procedure: SPINAL CORD STIMULATOR  TRIAL;  Surgeon: Reginald Pandey MD;  Location: [facility] OR     LASER HOLMIUM LITHOTRIPSY URETER(S), INSERT STENT, COMBINED Left 2021    Procedure: CYSTOURETEROSCOPY, WITH  RETROGRADE PYELOGRAMS, INTERPRETATION OF FLUOROSCOPIC IMAGES, STONE BASKET EXTRACTION, LITHOTRIPSY USING LASER AND LEFT URETERAL STENT REMOVAL AND INSERTION;  Surgeon: Luis Antonio Haynes MD;  Location: [facility] OR     ORTHOPEDIC SURGERY      ORIF Ankle Left 2017     ORTHOPEDIC SURGERY      Bunionectomy Nolan.     OTHER SURGICAL HISTORY      transvaginal     ZZC RECONSTR TOTAL SHOULDER IMPLANT Left 3/16/2021    Procedure: LEFT REVERSE TOTAL SHOULDER ARTHROPLASTY;  Surgeon: Han Blandon MD;  Location: [facility];  Service: Orthopedics       Family History    Reviewed, and family history includes Cancer in her father; No Known Problems in her mother.     Social History    Reviewed, and she  reports that she quit smoking about 4 years ago. Her smoking use included cigarettes. She smoked an average of .5 packs per day. She has never used smokeless tobacco. She reports that she does not currently use alcohol. She reports that she does not use drugs.  Social History     Tobacco Use     Smoking status: Former     Packs/day: 0.50     Types: Cigarettes     Quit date: 2018     Years since quittin.8     Smokeless tobacco: Never   Substance Use Topics     Alcohol use: Not Currently      Comment: sober for 6 years        Allergies     Allergies   Allergen Reactions     Avelox [Moxifloxacin] Anaphylaxis     Requiring epinephrine     Cefaclor Hives     Tolerated cephalexin, tolerated cefepime     Sulfa Drugs Hives       Prior to Admission Medications      Medications Prior to Admission   Medication Sig Dispense Refill Last Dose     acetaminophen (TYLENOL) 325 MG tablet Take 325-650 mg by mouth every 4 hours as needed for mild pain   Past Week     albuterol (PROAIR HFA/PROVENTIL HFA/VENTOLIN HFA) 108 (90 Base) MCG/ACT inhaler Inhale 2 puffs into the lungs every 4 hours as needed for shortness of breath / dyspnea or wheezing (VENTOLIN)   Past Week at has with     aspirin (ASA) 81 MG chewable tablet Take 81 mg by mouth daily   11/14/2022     buPROPion (WELLBUTRIN XL) 300 MG 24 hr tablet Take 300 mg by mouth every morning    11/30/2022     calcitonin, salmon, (MIACALCIN) 200 UNIT/ACT nasal spray Spray 1 spray into one nostril alternating nostrils daily Alternate nostril each day.   11/29/2022 at has with     calcium carbonate-vitamin D (OSCAL) 500-5 MG-MCG tablet Take 1 tablet by mouth 2 times daily   11/29/2022     cycloSPORINE (RESTASIS) 0.05 % ophthalmic emulsion Place 1 drop into both eyes 2 times daily   11/29/2022 at not with     diltiazem (CARDIZEM) 60 MG tablet Take 60 mg by mouth 3 times daily   11/30/2022 at am x 1     doxepin (SINEQUAN) 25 MG capsule Take 25-50 mg by mouth nightly as needed for sleep   prn     fentaNYL 37.5 MCG/HR 72 hr patch Place 1 patch onto the skin every 72 hours   11/30/2022     ferrous sulfate (FEROSUL) 325 (65 Fe) MG tablet Take 325 mg by mouth daily (with breakfast)   11/29/2022     fish oil-omega-3 fatty acids 1000 MG capsule Take 2 g by mouth 2 times daily Stopped 11/21/22 before surgery.   11/21/2022     fluticasone (FLONASE) 50 MCG/ACT nasal spray Spray 2 sprays into both nostrils daily    11/29/2022 at has with     fluticasone-salmeterol (ADVAIR) 100-50 MCG/DOSE  inhaler Inhale 1 puff into the lungs every 12 hours   11/29/2022 at has with     gabapentin (NEURONTIN) 300 MG capsule Take 900 mg by mouth 3 times daily (300MG X 3 = 900MG)   11/30/2022 at am x 1     HYDROmorphone (DILAUDID) 4 MG tablet Take 4 mg by mouth every 6 hours as needed for severe pain (7-10)   11/29/2022     leflunomide (ARAVA) 20 MG tablet Take 20 mg by mouth every evening Stopped 11/16/22 before surgery.   11/16/2022     levothyroxine (SYNTHROID/LEVOTHROID) 50 MCG tablet Take 50 mcg by mouth daily   11/30/2022     liothyronine (CYTOMEL) 5 MCG tablet Take 5 mcg by mouth 2 times daily   11/29/2022     lisinopril (ZESTRIL) 5 MG tablet Take 1 tablet (5 mg) by mouth daily   11/29/2022     lurasidone (LATUDA) 20 MG TABS tablet Take 20 mg by mouth At Bedtime    11/29/2022     methylPREDNISolone (MEDROL DOSEPAK) 4 MG tablet therapy pack Take 4 mg by mouth daily Takes 4 mg daily 21 tablet 0 11/30/2022     multivitamin, therapeutic (THERA-VIT) TABS tablet Take 1 tablet by mouth daily Stopped 11/21/22 before surgery.   11/21/2022     naloxone (NARCAN) 4 MG/0.1ML nasal spray Spray 4 mg into one nostril alternating nostrils once as needed for opioid reversal every 2-3 minutes until assistance arrives        nystatin (MYCOSTATIN) 698430 UNIT/GM external powder Apply topically 4 times daily as needed Under abdominal wall    prn     omeprazole (PRILOSEC) 20 MG DR capsule Take 20 mg by mouth 2 times daily    11/29/2022     phentermine (ADIPEX-P) 37.5 MG tablet Take 37.5 mg by mouth every morning (before breakfast) 90 tablet 0 11/21/2022     polyvinyl alcohol (LIQUIFILM TEARS) 1.4 % ophthalmic solution Place 1 drop into both eyes 3 times daily as needed for dry eyes   prn at not with     pravastatin (PRAVACHOL) 80 MG tablet Take 80 mg by mouth At Bedtime   11/29/2022     Semaglutide, 2 MG/DOSE, (OZEMPIC, 2 MG/DOSE,) 8 MG/3ML SOPN Inject 2 mg Subcutaneous once a week 24 mL 1 11/30/2022     venlafaxine (EFFEXOR-XR) 150 MG  "24 hr capsule Take 150 mg by mouth daily   11/30/2022     Vibegron (GEMTESA) 75 MG TABS tablet Take 75 mg by mouth daily   11/29/2022     vitamin D2 (ERGOCALCIFEROL) 26262 units (1250 mcg) capsule Take 50,000 Units by mouth four times a week MWFSA   11/28/2022     ixekizumab (TALTZ) 80 MG/ML SOAJ auto-injector Inject 1 mL (80 mg) Subcutaneous every 28 days          Review of Systems   A 12 point comprehensive review of systems was negative except as noted above.    OBJECTIVE         Physical Exam   Temp:  [97.1  F (36.2  C)-97.2  F (36.2  C)] 97.2  F (36.2  C)  Pulse:  [77-94] 81  Resp:  [10-27] 25  BP: (113-132)/(62-76) 121/74  SpO2:  [89 %-100 %] 91 %  Body mass index is 44.2 kg/m .    GENERAL:  Alert, appears comfortable, in no acute distress, appears stated age   HEAD:  Normocephalic, without obvious abnormality, atraumatic   EYES:  PERRL, conjunctiva  clear,  EOM's intact   NOSE: Nares normal,  mucosa normal, no drainage   THROAT: Lips, mucosa,  gums normal, mouth moist   NECK: Supple, symmetrical, trachea midline   BACK:   Symmetric, no curvature, ROM normal   LUNGS:   Clear to auscultation bilaterally, no rales, rhonchi, or wheezing, symmetric chest rise on inhalation, respirations unlabored   CHEST WALL:  No tenderness or deformity   HEART:  Regular rate and rhythm, S1 and S2 normal, no murmur    ABDOMEN:   Soft, non-tender, bowel sounds active , no masses, no organomegaly, no rebound or guarding   EXTREMITIES: Status post left total knee arthroplasty   SKIN: No rashes   NEURO: Alert, oriented x  3   PSYCH: Cooperative, behavior is appropriate       Imaging Reviewed Personally By Myself    Radiology Results:   Recent Results (from the past 24 hour(s))   POC US Guidance Needle Placement    Narrative    Ultrasound was performed as guidance to an anesthesia procedure.  Click   \"PACS images\" hyperlink below to view any stored images.  For specific   procedure details, view procedure note authored by anesthesia. "       Labs Reviewed Personally By Myself     Hemoglobin 14  Most Recent 3 BMP's:Recent Labs   Lab Test 11/30/22  1623 11/30/22  1336 11/20/21  0349 05/04/21  1004 04/15/21  0703 04/14/21  1650 04/14/21  0554   NA  --   --  138  --  140  --  141   POTASSIUM  --   --  4.1 3.7 4.2   < > 3.3*   CHLORIDE  --   --  104  --  108  --  107   CO2  --   --  32  --  24  --  26   BUN  --   --  11  --  20  --  19   CR  --   --  0.56 0.62 0.89  --  0.83   ANIONGAP  --   --  2*  --  8  --  8   EVELIA  --   --  8.7  --  8.4*  --  8.2*   * 104* 92  --  108*  --  108*    < > = values in this interval not displayed.       Preoperative Labs Reviewed Personally By Myself     Thank you for this consultation.  Appreciate the opportunity to participate in the care of Swathi Gibbons, please feel free to contact us for any questions or concerns.    Jennifer Lozada MD  [facility]  Phone: #[phone]

## 2022-11-30 NOTE — INTERVAL H&P NOTE
"I have reviewed the surgical (or preoperative) H&P that is linked to this encounter, and examined the patient. There are no significant changes    Clinical Conditions Present on Arrival:  Clinically Significant Risk Factors Present on Admission                    # Severe Obesity: Estimated body mass index is 44.2 kg/m  as calculated from the following:    Height as of this encounter: 1.6 m (5' 3\").    Weight as of this encounter: 113.2 kg (249 lb 8 oz).       "

## 2022-11-30 NOTE — PHARMACY-ADMISSION MEDICATION HISTORY
Pharmacy Note - Admission Medication History    Pertinent Provider Information:    ______________________________________________________________________    Prior To Admission (PTA) med list completed and updated in EMR.       PTA Med List   Medication Sig Note Last Dose     acetaminophen (TYLENOL) 325 MG tablet Take 325-650 mg by mouth every 4 hours as needed for mild pain  Past Week     albuterol (PROAIR HFA/PROVENTIL HFA/VENTOLIN HFA) 108 (90 Base) MCG/ACT inhaler Inhale 2 puffs into the lungs every 4 hours as needed for shortness of breath / dyspnea or wheezing (VENTOLIN)  Past Week at has with     aspirin (ASA) 81 MG chewable tablet Take 81 mg by mouth daily 11/28/2022: LD 11/14 11/14/2022     buPROPion (WELLBUTRIN XL) 300 MG 24 hr tablet Take 300 mg by mouth every morning   11/30/2022     calcitonin, salmon, (MIACALCIN) 200 UNIT/ACT nasal spray Spray 1 spray into one nostril alternating nostrils daily Alternate nostril each day.  11/29/2022 at has with     calcium carbonate-vitamin D (OSCAL) 500-5 MG-MCG tablet Take 1 tablet by mouth 2 times daily  11/29/2022     cycloSPORINE (RESTASIS) 0.05 % ophthalmic emulsion Place 1 drop into both eyes 2 times daily  11/29/2022 at not with     diltiazem (CARDIZEM) 60 MG tablet Take 60 mg by mouth 3 times daily  11/30/2022 at am x 1     doxepin (SINEQUAN) 25 MG capsule Take 25-50 mg by mouth nightly as needed for sleep  prn     fentaNYL 37.5 MCG/HR 72 hr patch Place 1 patch onto the skin every 72 hours  11/30/2022     ferrous sulfate (FEROSUL) 325 (65 Fe) MG tablet Take 325 mg by mouth daily (with breakfast)  11/29/2022     fish oil-omega-3 fatty acids 1000 MG capsule Take 2 g by mouth 2 times daily Stopped 11/21/22 before surgery.  11/21/2022     fluticasone (FLONASE) 50 MCG/ACT nasal spray Spray 2 sprays into both nostrils daily   11/29/2022 at has with     fluticasone-salmeterol (ADVAIR) 100-50 MCG/DOSE inhaler Inhale 1 puff into the lungs every 12 hours  11/29/2022 at  has with     gabapentin (NEURONTIN) 300 MG capsule Take 900 mg by mouth 3 times daily (300MG X 3 = 900MG)  11/30/2022 at am x 1     HYDROmorphone (DILAUDID) 4 MG tablet Take 4 mg by mouth every 6 hours as needed for severe pain (7-10)  11/29/2022     leflunomide (ARAVA) 20 MG tablet Take 20 mg by mouth every evening Stopped 11/16/22 before surgery.  11/16/2022     levothyroxine (SYNTHROID/LEVOTHROID) 50 MCG tablet Take 50 mcg by mouth daily  11/30/2022     liothyronine (CYTOMEL) 5 MCG tablet Take 5 mcg by mouth 2 times daily  11/29/2022     lisinopril (ZESTRIL) 5 MG tablet Take 1 tablet (5 mg) by mouth daily  11/29/2022     lurasidone (LATUDA) 20 MG TABS tablet Take 20 mg by mouth At Bedtime   11/29/2022     methylPREDNISolone (MEDROL DOSEPAK) 4 MG tablet therapy pack Take 4 mg by mouth daily Takes 4 mg daily  11/30/2022     multivitamin, therapeutic (THERA-VIT) TABS tablet Take 1 tablet by mouth daily Stopped 11/21/22 before surgery.  11/21/2022     naloxone (NARCAN) 4 MG/0.1ML nasal spray Spray 4 mg into one nostril alternating nostrils once as needed for opioid reversal every 2-3 minutes until assistance arrives       nystatin (MYCOSTATIN) 353371 UNIT/GM external powder Apply topically 4 times daily as needed Under abdominal wall   prn     omeprazole (PRILOSEC) 20 MG DR capsule Take 20 mg by mouth 2 times daily   11/29/2022     phentermine (ADIPEX-P) 37.5 MG tablet Take 37.5 mg by mouth every morning (before breakfast)  11/21/2022     polyvinyl alcohol (LIQUIFILM TEARS) 1.4 % ophthalmic solution Place 1 drop into both eyes 3 times daily as needed for dry eyes  prn at not with     pravastatin (PRAVACHOL) 80 MG tablet Take 80 mg by mouth At Bedtime  11/29/2022     Semaglutide, 2 MG/DOSE, (OZEMPIC, 2 MG/DOSE,) 8 MG/3ML SOPN Inject 2 mg Subcutaneous once a week  11/30/2022     venlafaxine (EFFEXOR-XR) 150 MG 24 hr capsule Take 150 mg by mouth daily  11/30/2022     Vibegron (GEMTESA) 75 MG TABS tablet Take 75 mg by  mouth daily  11/29/2022     vitamin D2 (ERGOCALCIFEROL) 74026 units (1250 mcg) capsule Take 50,000 Units by mouth four times a week MWFSA  11/28/2022       Information source(s): Patient and CareEverywhere/SureScripts    Method of interview communication: in-person    Patient was asked about OTC/herbal products specifically.  PTA med list reflects this.    Based on the pharmacist's assessment, the PTA med list information appears reliable    Allergies were reviewed, assessed, and updated with the patient.      Medications available for use during hospital stay: Albuterol, Calcitonin, Flonase, Advair.      Thank you for the opportunity to participate in the care of this patient.      Raffaele Stallings FABIOLA     11/30/2022     1:06 PM

## 2022-11-30 NOTE — ANESTHESIA CARE TRANSFER NOTE
Patient: Swathi Gibbons    Procedure: Procedure(s):  LEFT TOTAL KNEE ARTHROPLASTY       Diagnosis: Osteoarthritis of left knee [M17.12]  Diagnosis Additional Information: No value filed.    Anesthesia Type:   Spinal     Note:    Oropharynx: oropharynx clear of all foreign objects and spontaneously breathing  Level of Consciousness: drowsy  Oxygen Supplementation: face mask  Level of Supplemental Oxygen (L/min / FiO2): 10  Independent Airway: airway patency satisfactory and stable    Vital Signs Stable: post-procedure vital signs reviewed and stable  Report to RN Given: handoff report given  Patient transferred to: PACU    Handoff Report: Identifed the Patient, Identified the Reponsible Provider, Reviewed the pertinent medical history, Discussed the surgical course, Reviewed Intra-OP anesthesia mangement and issues during anesthesia, Set expectations for post-procedure period and Allowed opportunity for questions and acknowledgement of understanding      Vitals:  Vitals Value Taken Time   /70 11/30/22 1615   Temp 36.2  C (97.2  F) 11/30/22 1615   Pulse 86 11/30/22 1619   Resp 22 11/30/22 1619   SpO2 93 % 11/30/22 1619   Vitals shown include unvalidated device data.    Electronically Signed By: JIMMIE Smith CRNA  November 30, 2022  4:20 PM

## 2022-11-30 NOTE — ANESTHESIA PREPROCEDURE EVALUATION
Anesthesia Pre-Procedure Evaluation    Patient: Swathi Gibbons   MRN: [id number] : 1965        Procedure : Procedure(s):  LEFT TOTAL KNEE ARTHROPLASTY          Past Medical History:   Diagnosis Date     Alcohol use disorder, severe, in sustained remission (H)      Alcoholism (H)      Anxiety and depression      Arthritis     PSORIATIC     Asthma      Back pain      Chronic pain      Chronic rhinitis      Chronic rhinitis      Depression      Depressive disorder      Diabetes (H)     Type II     Esophageal dysmotility     Tortuous esophagus     EtOH dependence (H)      Gastroesophageal reflux disease      History of blood clots     superficial blood clot - has seen Heme d/t family hx of DVT's     HTN (hypertension)      Hyperlipidemia      Hypertension      Hypothyroidism      Hypothyroidism      Latent tuberculosis      MRSA (methicillin resistant staph aureus) culture positive      Nodular goiter      NABOR (obstructive sleep apnea)     C-PAP     Other chronic pain     back pain     Psoriasis      Psoriatic arthritis (H)      Renal disease      Secondary hyperparathyroidism (H)      Sleep apnea      Uncomplicated asthma      Vitamin D deficiency       Past Surgical History:   Procedure Laterality Date     ANKLE ARTHROPLASTY Left     pins     BACK SURGERY      L4-5 Decompression and Fusion     BLADDER SURGERY       BREAST SURGERY      Right breast biopsy, benign     BUNIONECTOMY Bilateral       SECTION       COMBINED CYSTOSCOPY, INSERT STENT URETER(S) Left 2021    Procedure: CYSTOSCOPY, WITH URETERAL STENT INSERTION;  Surgeon: LuisA ntonio Haynes MD;  Location: [facility] OR     DILATION AND CURETTAGE  ,      ENT SURGERY      Tonsillectomy     EYE SURGERY      Lasik     GI SURGERY      Bladder Suspension     GYN SURGERY       Section     GYN SURGERY      Tubal Ligation     INSERT STIMULATOR DORSAL COLUMN N/A 2019    Procedure: INSERTION, SPINAL CORD STIMULATOR, DORSAL COLUMN  ([employer]TRONIC);  Surgeon: Reginald Pandey MD;  Location:  OR     INSERT STIMULATOR DORSAL COLUMN TRIAL N/A 2019    Procedure: SPINAL CORD STIMULATOR  TRIAL;  Surgeon: Reginald Pandey MD;  Location: [facility] OR     LASER HOLMIUM LITHOTRIPSY URETER(S), INSERT STENT, COMBINED Left 2021    Procedure: CYSTOURETEROSCOPY, WITH  RETROGRADE PYELOGRAMS, INTERPRETATION OF FLUOROSCOPIC IMAGES, STONE BASKET EXTRACTION, LITHOTRIPSY USING LASER AND LEFT URETERAL STENT REMOVAL AND INSERTION;  Surgeon: Luis Antonio Haynes MD;  Location: [facility] OR     ORTHOPEDIC SURGERY      ORIF Ankle Left 2017     ORTHOPEDIC SURGERY      Bunionectomy Nolan.     OTHER SURGICAL HISTORY      transvaginal     ZZC RECONSTR TOTAL SHOULDER IMPLANT Left 3/16/2021    Procedure: LEFT REVERSE TOTAL SHOULDER ARTHROPLASTY;  Surgeon: Han Blandon MD;  Location: [facility];  Service: Orthopedics      Allergies   Allergen Reactions     Avelox [Moxifloxacin] Anaphylaxis     Requiring epinephrine     Cefaclor Hives     Tolerated cephalexin, tolerated cefepime     Sulfa Drugs Hives      Social History     Tobacco Use     Smoking status: Former     Packs/day: 0.50     Types: Cigarettes     Quit date: 2018     Years since quittin.8     Smokeless tobacco: Never   Substance Use Topics     Alcohol use: Not Currently     Comment: sober for 6 years      Wt Readings from Last 1 Encounters:   22 113.2 kg (249 lb 8 oz)        Anesthesia Evaluation   Pt has had prior anesthetic.     No history of anesthetic complications       ROS/MED HX  ENT/Pulmonary:     (+) sleep apnea, uses CPAP, asthma     Neurologic:       Cardiovascular:     (+) hypertension-----    METS/Exercise Tolerance:     Hematologic:       Musculoskeletal:   (+) arthritis,     GI/Hepatic:     (+) GERD,     Renal/Genitourinary:     (+) renal disease,     Endo:     (+) type II DM, thyroid problem, Chronic steroid usage for Arthritis. Obesity,     Psychiatric/Substance Use:        Infectious Disease:       Malignancy:       Other:      (+) , H/O Chronic Pain,        Physical Exam    Airway  airway exam normal       TM distance: > 3 FB   Neck ROM: full     Respiratory Devices and Support         Dental  no notable dental history         Cardiovascular   cardiovascular exam normal          Pulmonary   pulmonary exam normal                OUTSIDE LABS:  CBC:   Lab Results   Component Value Date    WBC 8.4 11/20/2021    WBC 4.1 04/15/2021    HGB 10.8 (L) 11/20/2021    HGB 10.6 (L) 05/04/2021    HCT 35.3 11/20/2021    HCT 29.4 (L) 04/15/2021     11/20/2021     05/04/2021     BMP:   Lab Results   Component Value Date     11/20/2021     04/15/2021    POTASSIUM 4.1 11/20/2021    POTASSIUM 3.7 05/04/2021    CHLORIDE 104 11/20/2021    CHLORIDE 108 04/15/2021    CO2 32 11/20/2021    CO2 24 04/15/2021    BUN 11 11/20/2021    BUN 20 04/15/2021    CR 0.56 11/20/2021    CR 0.62 05/04/2021     (H) 11/30/2022    GLC 92 11/20/2021     COAGS: No results found for: PTT, INR, FIBR  POC:   Lab Results   Component Value Date     (H) 05/04/2021     HEPATIC:   Lab Results   Component Value Date    ALBUMIN 3.2 (L) 04/12/2021    PROTTOTAL 6.1 04/12/2021    ALT 14 04/12/2021    AST 15 04/12/2021    GGT 30 09/14/2007    ALKPHOS 78 04/12/2021    BILITOTAL 0.4 04/12/2021     OTHER:   Lab Results   Component Value Date    LACT 1.7 04/13/2021    A1C 5.6 04/13/2021    EVELIA 8.7 11/20/2021    PHOS 3.5 09/14/2007    MAG 2.1 09/14/2007    LIPASE <9 04/12/2021    TSH 2.73 09/14/2007    CRP 14.4 (H) 04/12/2021       Anesthesia Plan    ASA Status:  3   NPO Status:  NPO Appropriate    Anesthesia Type: Spinal.              Consents    Anesthesia Plan(s) and associated risks, benefits, and realistic alternatives discussed. Questions answered and patient/representative(s) expressed understanding.    - Discussed:     - Discussed with:  Patient         Postoperative Care    Pain management:  Peripheral nerve block (Single Shot).   PONV prophylaxis: Ondansetron (or other 5HT-3), Dexamethasone or Solumedrol     Comments:    Other Comments: GETA Backup  Stress dose steroids            Anuel Croft MD

## 2022-11-30 NOTE — OP NOTE
Operative Note    Name:  Swathi Gibbons  Location: [facility] Main OR  Procedure Date:  11/30/2022  PCP:  Estelle Collins      Procedure(s):  LEFT TOTAL KNEE ARTHROPLASTY     Implant Name Type Inv. Item Serial No.  Lot No. LRB No. Used Action   IMP BASEPLATE TIBIAL STRK TRIATHLN KNEE SZ 4 5536-B-400 - [id number] Total Joint Component/Insert IMP BASEPLATE TIBIAL STRK TRIATHLN KNEE SZ 4 5536-B-400  [id number] Left 1 Implanted   IMP COMP FEM STRK TRIATHLN CR BD W/PA LT 4 5517-F-401 - [id number] Total Joint Component/Insert IMP COMP FEM STRK TRIATHLN CR BD W/PA LT 4 5517-F-401  [employer] [id number] Left 1 Implanted   IMP INSERT TIBIAL STRK TRI 4X11MM [id number] - [id number] Total Joint Component/Insert IMP INSERT TIBIAL STRK TRI 4X11MM [id number]  [employer] [id number] Left 1 Implanted       Pre-Procedure Diagnosis:  Osteoarthritis of left knee [M17.12]     Post-Procedure Diagnosis:    same    Surgeon(s):  Xavier Schroeder, Deandre Delarosa MD      Assistants:  Required for patient positioning, intraoperative retraction, patient safety, and wound closure.    Anesthesia Type:  Spinal with Block     Findings:  Arthritis    Operative Report:      After satisfactory infiltration of regional block anesthetic in the preoperative holding area, the patient was taken to the operating room.  Spinal anesthesia was administered.  The patient's operative extremity was then prepped and draped sterile.  A timeout was then taken to ensure proper surgical site.  A medial parapatellar arthrotomy was then performed.  The menisci and fat pad were sacrificed.  The distal femoral cut was made taking 8mm off, 5 degrees valgus.  Osteophytes were then removed.  Proximal tibia cut was then made.  The proper rotation of the distal femur was then set, sized, chamfer cuts were made.  At this point an intraoperative standardized cocktail was infiltrated around the periarticular soft tissues.  Trials  were implanted.  Patella was everted and denervated with cautery.  Osteophytes removed.  Trials showed excellent flexion and extension gaps, excellent range of motion with excellent patellar tracking.  Trials were removed and the tibial keel punch was then made.  The wound then copiously irrigated.  Components then impacted into place.  The wound irrigated once more.  The wound then closed in layers: #1 Vicryl for the extensor mechanism, 2-0 Vicryl for the subcutaneous tissues, 3-0 Monocryl for the skin followed by Dermabond.  Sterile dressings were applied.  The patient was then awakened and taken to the [facility] stable.    Plan:  Initiate DVT prophylaxis protocol including early ambulation, mechanical and chemical prophylaxis.    Estimated Blood Loss:   50cc       Complications:    None    Deandre Correa MD     Date: 11/30/2022  Time: 3:38 PM

## 2022-12-01 ENCOUNTER — APPOINTMENT (OUTPATIENT)
Dept: PHYSICAL THERAPY | Facility: CLINIC | Age: 57
End: 2022-12-01
Attending: ORTHOPAEDIC SURGERY
Payer: COMMERCIAL

## 2022-12-01 ENCOUNTER — APPOINTMENT (OUTPATIENT)
Dept: OCCUPATIONAL THERAPY | Facility: CLINIC | Age: 57
End: 2022-12-01
Attending: ORTHOPAEDIC SURGERY
Payer: COMMERCIAL

## 2022-12-01 VITALS
TEMPERATURE: 97.8 F | HEIGHT: 63 IN | DIASTOLIC BLOOD PRESSURE: 89 MMHG | WEIGHT: 252.6 LBS | HEART RATE: 80 BPM | RESPIRATION RATE: 17 BRPM | SYSTOLIC BLOOD PRESSURE: 139 MMHG | OXYGEN SATURATION: 92 % | BODY MASS INDEX: 44.76 KG/M2

## 2022-12-01 LAB
ANION GAP SERPL CALCULATED.3IONS-SCNC: 11 MMOL/L (ref 5–18)
BUN SERPL-MCNC: 11 MG/DL (ref 8–22)
CALCIUM SERPL-MCNC: 8.9 MG/DL (ref 8.5–10.5)
CHLORIDE BLD-SCNC: 103 MMOL/L (ref 98–107)
CO2 SERPL-SCNC: 25 MMOL/L (ref 22–31)
CREAT SERPL-MCNC: 0.67 MG/DL (ref 0.6–1.1)
ERYTHROCYTE [DISTWIDTH] IN BLOOD BY AUTOMATED COUNT: 13.5 % (ref 10–15)
GFR SERPL CREATININE-BSD FRML MDRD: >90 ML/MIN/1.73M2
GLUCOSE BLD-MCNC: 173 MG/DL (ref 70–125)
GLUCOSE BLDC GLUCOMTR-MCNC: 118 MG/DL (ref 70–99)
GLUCOSE BLDC GLUCOMTR-MCNC: 148 MG/DL (ref 70–99)
HCT VFR BLD AUTO: 36.6 % (ref 35–47)
HGB BLD-MCNC: 11.7 G/DL (ref 11.7–15.7)
MAGNESIUM SERPL-MCNC: 1.8 MG/DL (ref 1.8–2.6)
MCH RBC QN AUTO: 31 PG (ref 26.5–33)
MCHC RBC AUTO-ENTMCNC: 32 G/DL (ref 31.5–36.5)
MCV RBC AUTO: 97 FL (ref 78–100)
PLATELET # BLD AUTO: 190 10E3/UL (ref 150–450)
POTASSIUM BLD-SCNC: 4 MMOL/L (ref 3.5–5)
RBC # BLD AUTO: 3.78 10E6/UL (ref 3.8–5.2)
SODIUM SERPL-SCNC: 139 MMOL/L (ref 136–145)
WBC # BLD AUTO: 11.7 10E3/UL (ref 4–11)

## 2022-12-01 PROCEDURE — 97535 SELF CARE MNGMENT TRAINING: CPT | Mod: GO

## 2022-12-01 PROCEDURE — 97161 PT EVAL LOW COMPLEX 20 MIN: CPT | Mod: GP

## 2022-12-01 PROCEDURE — 36415 COLL VENOUS BLD VENIPUNCTURE: CPT | Performed by: FAMILY MEDICINE

## 2022-12-01 PROCEDURE — 97110 THERAPEUTIC EXERCISES: CPT | Mod: GP

## 2022-12-01 PROCEDURE — 250N000011 HC RX IP 250 OP 636: Performed by: PAIN MEDICINE

## 2022-12-01 PROCEDURE — 83735 ASSAY OF MAGNESIUM: CPT | Performed by: PAIN MEDICINE

## 2022-12-01 PROCEDURE — 85027 COMPLETE CBC AUTOMATED: CPT | Performed by: FAMILY MEDICINE

## 2022-12-01 PROCEDURE — 250N000011 HC RX IP 250 OP 636

## 2022-12-01 PROCEDURE — 99417 PROLNG OP E/M EACH 15 MIN: CPT | Performed by: PAIN MEDICINE

## 2022-12-01 PROCEDURE — 250N000011 HC RX IP 250 OP 636: Performed by: FAMILY MEDICINE

## 2022-12-01 PROCEDURE — 250N000013 HC RX MED GY IP 250 OP 250 PS 637

## 2022-12-01 PROCEDURE — 250N000013 HC RX MED GY IP 250 OP 250 PS 637: Performed by: PAIN MEDICINE

## 2022-12-01 PROCEDURE — 97116 GAIT TRAINING THERAPY: CPT | Mod: GP

## 2022-12-01 PROCEDURE — 97166 OT EVAL MOD COMPLEX 45 MIN: CPT | Mod: GO

## 2022-12-01 PROCEDURE — 250N000013 HC RX MED GY IP 250 OP 250 PS 637: Performed by: FAMILY MEDICINE

## 2022-12-01 PROCEDURE — 82962 GLUCOSE BLOOD TEST: CPT

## 2022-12-01 PROCEDURE — 82310 ASSAY OF CALCIUM: CPT | Performed by: FAMILY MEDICINE

## 2022-12-01 PROCEDURE — 96372 THER/PROPH/DIAG INJ SC/IM: CPT | Performed by: FAMILY MEDICINE

## 2022-12-01 PROCEDURE — 250N000012 HC RX MED GY IP 250 OP 636 PS 637: Performed by: FAMILY MEDICINE

## 2022-12-01 PROCEDURE — 99215 OFFICE O/P EST HI 40 MIN: CPT | Performed by: PAIN MEDICINE

## 2022-12-01 RX ORDER — MAGNESIUM SULFATE HEPTAHYDRATE 40 MG/ML
2 INJECTION, SOLUTION INTRAVENOUS ONCE
Status: COMPLETED | OUTPATIENT
Start: 2022-12-01 | End: 2022-12-01

## 2022-12-01 RX ORDER — HYDROMORPHONE HYDROCHLORIDE 2 MG/1
4 TABLET ORAL 3 TIMES DAILY
Status: DISCONTINUED | OUTPATIENT
Start: 2022-12-01 | End: 2022-12-01 | Stop reason: HOSPADM

## 2022-12-01 RX ORDER — KETOROLAC TROMETHAMINE 15 MG/ML
15 INJECTION, SOLUTION INTRAMUSCULAR; INTRAVENOUS ONCE
Status: COMPLETED | OUTPATIENT
Start: 2022-12-01 | End: 2022-12-01

## 2022-12-01 RX ORDER — FENTANYL 37.5 UG/H
1 PATCH, EXTENDED RELEASE TRANSDERMAL
Qty: 1 PATCH | Refills: 0 | Status: SHIPPED | OUTPATIENT
Start: 2022-12-01

## 2022-12-01 RX ORDER — HYDROMORPHONE HYDROCHLORIDE 4 MG/1
4 TABLET ORAL
Qty: 20 TABLET | Refills: 0 | Status: SHIPPED | OUTPATIENT
Start: 2022-12-01

## 2022-12-01 RX ORDER — HYDROMORPHONE HYDROCHLORIDE 2 MG/1
4 TABLET ORAL
Status: DISCONTINUED | OUTPATIENT
Start: 2022-12-01 | End: 2022-12-01 | Stop reason: HOSPADM

## 2022-12-01 RX ADMIN — HYDROMORPHONE HYDROCHLORIDE 0.2 MG: 0.2 INJECTION, SOLUTION INTRAMUSCULAR; INTRAVENOUS; SUBCUTANEOUS at 00:26

## 2022-12-01 RX ADMIN — DILTIAZEM HYDROCHLORIDE 60 MG: 30 TABLET, FILM COATED ORAL at 09:12

## 2022-12-01 RX ADMIN — LISINOPRIL 5 MG: 5 TABLET ORAL at 09:13

## 2022-12-01 RX ADMIN — INSULIN ASPART 1 UNITS: 100 INJECTION, SOLUTION INTRAVENOUS; SUBCUTANEOUS at 09:30

## 2022-12-01 RX ADMIN — CEFAZOLIN SODIUM 2 G: 2 INJECTION, SOLUTION INTRAVENOUS at 06:19

## 2022-12-01 RX ADMIN — Medication 1 TABLET: at 09:14

## 2022-12-01 RX ADMIN — BUPROPION HYDROCHLORIDE 300 MG: 300 TABLET, EXTENDED RELEASE ORAL at 09:13

## 2022-12-01 RX ADMIN — ACETAMINOPHEN 975 MG: 325 TABLET ORAL at 14:05

## 2022-12-01 RX ADMIN — MAGNESIUM SULFATE HEPTAHYDRATE 2 G: 40 INJECTION, SOLUTION INTRAVENOUS at 09:14

## 2022-12-01 RX ADMIN — HYDROMORPHONE HYDROCHLORIDE 4 MG: 2 TABLET ORAL at 04:11

## 2022-12-01 RX ADMIN — HYDROCORTISONE SODIUM SUCCINATE 60 MG: 100 INJECTION, POWDER, FOR SOLUTION INTRAMUSCULAR; INTRAVENOUS at 01:43

## 2022-12-01 RX ADMIN — KETOROLAC TROMETHAMINE 15 MG: 15 INJECTION, SOLUTION INTRAMUSCULAR; INTRAVENOUS at 09:12

## 2022-12-01 RX ADMIN — HYDROCORTISONE SODIUM SUCCINATE 60 MG: 100 INJECTION, POWDER, FOR SOLUTION INTRAMUSCULAR; INTRAVENOUS at 09:10

## 2022-12-01 RX ADMIN — ASPIRIN 81 MG: 81 TABLET, COATED ORAL at 09:14

## 2022-12-01 RX ADMIN — HYDROMORPHONE HYDROCHLORIDE 4 MG: 2 TABLET ORAL at 09:08

## 2022-12-01 RX ADMIN — LIOTHYRONINE SODIUM 5 MCG: 5 TABLET ORAL at 09:12

## 2022-12-01 RX ADMIN — DILTIAZEM HYDROCHLORIDE 60 MG: 30 TABLET, FILM COATED ORAL at 14:37

## 2022-12-01 RX ADMIN — ACETAMINOPHEN 975 MG: 325 TABLET ORAL at 06:18

## 2022-12-01 RX ADMIN — PANTOPRAZOLE SODIUM 40 MG: 20 TABLET, DELAYED RELEASE ORAL at 06:19

## 2022-12-01 RX ADMIN — VENLAFAXINE HYDROCHLORIDE 150 MG: 150 CAPSULE, EXTENDED RELEASE ORAL at 09:13

## 2022-12-01 RX ADMIN — GABAPENTIN 900 MG: 300 CAPSULE ORAL at 14:37

## 2022-12-01 RX ADMIN — SENNOSIDES AND DOCUSATE SODIUM 1 TABLET: 50; 8.6 TABLET ORAL at 09:13

## 2022-12-01 RX ADMIN — FLUTICASONE PROPIONATE AND SALMETEROL 1 PUFF: 100; 50 POWDER RESPIRATORY (INHALATION) at 09:13

## 2022-12-01 RX ADMIN — POLYETHYLENE GLYCOL 3350 17 G: 17 POWDER, FOR SOLUTION ORAL at 09:12

## 2022-12-01 RX ADMIN — GABAPENTIN 900 MG: 300 CAPSULE ORAL at 09:13

## 2022-12-01 RX ADMIN — LEVOTHYROXINE SODIUM 50 MCG: 50 TABLET ORAL at 06:18

## 2022-12-01 RX ADMIN — HYDROMORPHONE HYDROCHLORIDE 4 MG: 2 TABLET ORAL at 14:04

## 2022-12-01 ASSESSMENT — ACTIVITIES OF DAILY LIVING (ADL)
ADLS_ACUITY_SCORE: 22
PREVIOUS_RESPONSIBILITIES: MEAL PREP;HOUSEKEEPING;LAUNDRY;SHOPPING;DRIVING
ADLS_ACUITY_SCORE: 22
ADLS_ACUITY_SCORE: 22

## 2022-12-01 NOTE — PLAN OF CARE
Goal Outcome Evaluation: ongoing.   Pt A&Ox4. Denies chest pain or SOB. Weaned down to 1L/RA, wore CPAP half of the night. Prn medications given for pain mainly L knee pain (prn dilaudid and oxy). Also has fentanyl patch on L shoulder. Voiding. Walked to [place] last night and then up to bathroom- 1 assist with walker. Ace wrap in place. PIV SL- pt tolerating diet/po.  Hemoglobin stable this am. Will continue to monitor.     Problem: Plan of Care - These are the overarching goals to be used throughout the patient stay.    Goal: Absence of Hospital-Acquired Illness or Injury  Intervention: Identify and Manage Fall Risk  Recent Flowsheet Documentation  Taken 12/1/2022 0459 by Mariah Estrada RN  Safety Promotion/Fall Prevention:    activity supervised    assistive device/personal items within reach    fall prevention program maintained    clutter free environment maintained  Taken 12/1/2022 0030 by Mariah Estrada RN  Safety Promotion/Fall Prevention:    activity supervised    assistive device/personal items within reach    fall prevention program maintained    clutter free environment maintained  Taken 11/30/2022 2010 by Mariah Estrada RN  Safety Promotion/Fall Prevention:    activity supervised    assistive device/personal items within reach    fall prevention program maintained    clutter free environment maintained  Intervention: Prevent Skin Injury  Recent Flowsheet Documentation  Taken 12/1/2022 0459 by Mariah Estrada RN  Body Position: position changed independently  Taken 12/1/2022 0030 by Mariah Estrada RN  Body Position: position changed independently  Taken 11/30/2022 2010 by Mariah Estrada RN  Body Position: position changed independently  Intervention: Prevent and Manage VTE (Venous Thromboembolism) Risk  Recent Flowsheet Documentation  Taken 12/1/2022 0459 by Mariah Estrada RN  VTE Prevention/Management: foot pump device on  Taken 12/1/2022 0030 by Mariah Estrada  RN  VTE Prevention/Management: foot pump device on  Taken 11/30/2022 2010 by Mariah Estrada RN  VTE Prevention/Management: foot pump device on  Goal: Optimal Comfort and Wellbeing  Intervention: Monitor Pain and Promote Comfort  Recent Flowsheet Documentation  Taken 12/1/2022 0545 by Mariah Estrada, RN  Pain Management Interventions: rest  Taken 12/1/2022 0456 by Mariah Estrada, RN  Pain Management Interventions: rest  Taken 12/1/2022 0041 by Mariah Estrada, RN  Pain Management Interventions: rest  Taken 11/30/2022 2342 by Mariah Estrada, RN  Pain Management Interventions: rest  Taken 11/30/2022 2113 by Mariah Estrada, RN  Pain Management Interventions: medication (see MAR)

## 2022-12-01 NOTE — PROGRESS NOTES
Occupational Therapy Discharge Summary    Reason for therapy discharge:    All goals and outcomes met, no further needs identified.    Progress towards therapy goal(s). See goals on Care Plan in [facility] electronic health record for goal details.  Goals met    Therapy recommendation(s):    No further therapy is recommended.

## 2022-12-01 NOTE — CONSULTS
[facility] ACUTE PAIN SERVICE CONSULTATION ([facility], [facility], [facility])     Date of Admission:  11/30/2022  Date of Consult (When I saw the patient): 12/01/22  Physician requesting consult: Rodríguez Mayorga and Xavier Schroeder   Reason for consult: postop pain     Assessment/Plan:     Swathi Gibbons is a 57 year old female who was admitted on 11/30/2022.  1 Day Post-Op. I was asked to see the patient for postop pain after left total knee arthroplasty. Admitted for planned surgery. History of severe obesity, HLD, HTN, psoriatic arthritis, asthma, NABOR, DM, chronic pain.. Pain began years ago - secondary to psoriatic arthritis (feet, hands, shoulder , knee pain) and has taken fentanyl patches and TID dilaudid for year. Describes pain as 5/10 and moderately controlled. The patient denies constipation and does not take stool softeners with this high dose opioid plan. The patient does not smoke and past chemical dependency history- past etoh use disorder.     PLAN:   1) Left knee Pain, secondary toLTKA ,in the setting of very high opioid tolerance. CAn continue fentantyl patch. Discontinue oxycodone and optimize dilaudid. Continue home gabapentin 900mg tid. Monitor for constipation. Pt is on O2 and also has NABOR- use pain medication with caution.   2)Multimodal Medication Therapy  Topical:  Ace wrap   Adjuvants:  Tyenol 1G TID, Vistaril 25-50mg Q6h PRN and Gabapentin 900mg TID, will add 1 dose of ketorolac for headache and 2 g of IV mag for headache  Antidepressants/anxiolytics: Wellbutrin per home dose & effexor   Opioids:  Oxycodonecan be dc'ed  and Hydromorphone(Dilaudid)4mg Q3h PRN and add TID scheduled per home dose   IV Pain medication: Dilaudid. and Try to minimize & give po first.  3)Non-medication interventions- Ice, Heat, physical therapy  4)Constipation Prophylaxis- senna and miralax    5) Follow up   -Opioid prescriber has been Sonya Saavedra at [facility]          History of Present Illness (HPI):       Swathi Gibbons is a 57 year old old female who presented for surgery.  The patient reports that the acute pain is in the left knee and it does not radiates.  Current pain is rated at 5/10 and goal is 0/10. The patient is with ongonig chronic pain, as well in many joints. Per MN  review today- chronic fills. The patient has a high opioid tolerance. Opioid induced side effects noted, including decreased SPO2    Discussed multimodal interventions, interventions other than systemic pharmacologic treatments for chronic pain including: behavioral approaches such as: ice     Review of medical record/Summary of labs and care everywhere. Looked at clinic note from Rodríguez Mayorga.  This indicates history of addiction.    Past pain treatments have included pain clinic-[facility] pain clinic    Last UA:    Amphetamine Qual Urine   Date Value Ref Range Status   09/14/2007 Negative NEG Final     Amphetamines Urine   Date Value Ref Range Status   04/12/2021 Screen Negative Screen Negative Final     Barbiturates Qual Urine   Date Value Ref Range Status   09/14/2007 Negative NEG Final     Barbiturates Urine   Date Value Ref Range Status   04/12/2021 Screen Negative Screen Negative Final     Benzodiazepine Qual Urine   Date Value Ref Range Status   09/14/2007 Negative NEG Final     Cannabinoids Qual Urine   Date Value Ref Range Status   09/14/2007 Negative NEG Final     Cannabinoids Urine   Date Value Ref Range Status   04/12/2021 Screen Negative Screen Negative Final     Cocaine Qual Urine   Date Value Ref Range Status   09/14/2007 Negative NEG Final     Cocaine Urine   Date Value Ref Range Status   04/12/2021 Screen Negative Screen Negative Final     Opiates Qualitative Urine   Date Value Ref Range Status   09/14/2007 Negative NEG Final     Opiates Urine   Date Value Ref Range Status   04/12/2021 (A) Screen Negative Final    Screen Positive (Confirmation available on request)     PCP  Qual Urine   Date Value Ref Range Status   12/04/2006 Not Detected  Final     Comment:             Reference Range:  Not Detected     PCP Urine   Date Value Ref Range Status   04/12/2021 Screen Negative Screen Negative Final        5 A's of treatment:   1. Level of analgesia:  Moderately     2. Presence of adverse effects: headache   3. Level of activity low    4. Aberrant drug related behavior unclear   5. Affect pleasant      A - alcohol use - past    B - Benzodiazepines and other drug use NA    C - Clearance and metabolism of drug  - CK1    D - Delirium, dementia and falls risk - fall risk    P - Psychiatric comorbidities - present     Q - Query the Prescription Monitoring Program - checked the  today  R - Respiratory insufficiency and sleep apnea - present    S - Safe driving, work, storage and disposal - information placed in discharge summary     Pain Assessment:   Description of Pain: corinna, left knee, comes and goes, 5/10    MN -pulled from system on 12/01/22. Last refill on 11/21 and 11/15. This indicated ongoing opioid use. 8 total number of prescribing providers noted.       Medical History   has a past medical history of Alcohol use disorder, severe, in sustained remission (H), Alcoholism (H), Anxiety and depression, Arthritis, Asthma, Back pain, Chronic pain, Chronic rhinitis, Chronic rhinitis, Depression, Depressive disorder, Diabetes (H), Esophageal dysmotility, EtOH dependence (H), Gastroesophageal reflux disease, History of blood clots, HTN (hypertension), Hyperlipidemia, Hypertension, Hypothyroidism, Hypothyroidism, Latent tuberculosis, MRSA (methicillin resistant staph aureus) culture positive, Nodular goiter, NABOR (obstructive sleep apnea), Other chronic pain, Psoriasis, Psoriatic arthritis (H), Renal disease, Secondary hyperparathyroidism (H), Sleep apnea, Uncomplicated asthma, and Vitamin D deficiency.    She has no past medical history of Cancer (H), Cerebral infarction (H), Congestive  heart failure (H), COPD (chronic obstructive pulmonary disease) (H), Difficult intubation, Heart disease, History of blood transfusion, Malignant hyperthermia, Motion sickness, or PONV (postoperative nausea and vomiting).       Surgical History   has a past surgical history that includes back surgery; GI surgery; Breast surgery; orthopedic surgery; orthopedic surgery; Eye surgery; GYN surgery; GYN surgery; ENT surgery; Insert stimulator dorsal column trial (N/A, 2019); Insert stimulator dorsal column (N/A, 2019); Combined Cystoscopy, Insert Stent Ureter(s) (Left, 2021); Laser holmium lithotripsy ureter(s), insert stent, combined (Left, 2021); Ankle Arthroplasty (Left, ); Dilation and curettage (, ); Bunionectomy (Bilateral); other surgical history; Bladder surgery;  Section (); and RECONSTR TOTAL SHOULDER IMPLANT (Left, 3/16/2021).     Allergies     Allergies   Allergen Reactions     Avelox [Moxifloxacin] Anaphylaxis     Requiring epinephrine     Cefaclor Hives     Tolerated cephalexin, tolerated cefepime     Sulfa Drugs Hives        Current Home Medications   Prior to Admission medications    Medication Sig Start Date End Date Taking? Authorizing Provider   acetaminophen (TYLENOL) 325 MG tablet Take 2 tablets (650 mg) by mouth every 4 hours as needed for other (mild pain) 22  Yes Xavier Schroeder PA-C   acetaminophen (TYLENOL) 325 MG tablet Take 325-650 mg by mouth every 4 hours as needed for mild pain   Yes Unknown, Entered By History   albuterol (PROAIR HFA/PROVENTIL HFA/VENTOLIN HFA) 108 (90 Base) MCG/ACT inhaler Inhale 2 puffs into the lungs every 4 hours as needed for shortness of breath / dyspnea or wheezing (VENTOLIN)   Yes Reported, Patient   aspirin (ASA) 81 MG chewable tablet Take 81 mg by mouth daily   Yes Reported, Patient   aspirin 81 MG EC tablet Take 1 tablet (81 mg) by mouth 2 times daily 22  Yes Xavier Schroeder PA-C   bisacodyl (DULCOLAX) 10  MG suppository Place 1 suppository (10 mg) rectally daily Discontinue if having bowel movements. 11/30/22  Yes Xavier Schroeder PA-C   buPROPion (WELLBUTRIN XL) 300 MG 24 hr tablet Take 300 mg by mouth every morning    Yes Reported, Patient   calcitonin, salmon, (MIACALCIN) 200 UNIT/ACT nasal spray Spray 1 spray into one nostril alternating nostrils daily Alternate nostril each day.   Yes Unknown, Entered By History   calcium carbonate-vitamin D (OSCAL) 500-5 MG-MCG tablet Take 1 tablet by mouth 2 times daily 11/18/22  Yes June Willard PA-C   cycloSPORINE (RESTASIS) 0.05 % ophthalmic emulsion Place 1 drop into both eyes 2 times daily   Yes Reported, Patient   diltiazem (CARDIZEM) 60 MG tablet Take 60 mg by mouth 3 times daily   Yes Reported, Patient   doxepin (SINEQUAN) 25 MG capsule Take 25-50 mg by mouth nightly as needed for sleep   Yes Unknown, Entered By History   fentaNYL 37.5 MCG/HR 72 hr patch Place 1 patch onto the skin every 72 hours   Yes Unknown, Entered By History   ferrous sulfate (FEROSUL) 325 (65 Fe) MG tablet Take 325 mg by mouth daily (with breakfast)   Yes Unknown, Entered By History   fish oil-omega-3 fatty acids 1000 MG capsule Take 2 g by mouth 2 times daily Stopped 11/21/22 before surgery.   Yes Unknown, Entered By History   fluticasone (FLONASE) 50 MCG/ACT nasal spray Spray 2 sprays into both nostrils daily    Yes Reported, Patient   fluticasone-salmeterol (ADVAIR) 100-50 MCG/DOSE inhaler Inhale 1 puff into the lungs every 12 hours   Yes Unknown, Entered By History   gabapentin (NEURONTIN) 300 MG capsule Take 900 mg by mouth 3 times daily (300MG X 3 = 900MG)   Yes Reported, Patient   HYDROmorphone (DILAUDID) 4 MG tablet Take 4 mg by mouth every 6 hours as needed for severe pain (7-10)   Yes Reported, Patient   leflunomide (ARAVA) 20 MG tablet Take 20 mg by mouth every evening Stopped 11/16/22 before surgery.   Yes Reported, Patient   levothyroxine (SYNTHROID/LEVOTHROID) 50 MCG  tablet Take 50 mcg by mouth daily   Yes Unknown, Entered By History   liothyronine (CYTOMEL) 5 MCG tablet Take 5 mcg by mouth 2 times daily 11/18/22  Yes June Willard PA-C   lisinopril (ZESTRIL) 5 MG tablet Take 1 tablet (5 mg) by mouth daily 11/18/22  Yes June Willard PA-C   lurasidone (LATUDA) 20 MG TABS tablet Take 20 mg by mouth At Bedtime    Yes Unknown, Entered By History   methylPREDNISolone (MEDROL DOSEPAK) 4 MG tablet therapy pack Take 4 mg by mouth daily Takes 4 mg daily 11/18/22  Yes June Willard PA-C   multivitamin, therapeutic (THERA-VIT) TABS tablet Take 1 tablet by mouth daily Stopped 11/21/22 before surgery.   Yes Reported, Patient   naloxone (NARCAN) 4 MG/0.1ML nasal spray Spray 4 mg into one nostril alternating nostrils once as needed for opioid reversal every 2-3 minutes until assistance arrives   Yes Reported, Patient   nystatin (MYCOSTATIN) 329107 UNIT/GM external powder Apply topically 4 times daily as needed Under abdominal wall    Yes Reported, Patient   omeprazole (PRILOSEC) 20 MG DR capsule Take 20 mg by mouth 2 times daily    Yes Reported, Patient   oxyCODONE (ROXICODONE) 5 MG tablet Take 1-2 tablets (5-10 mg) by mouth every 4 hours as needed for moderate to severe pain 11/30/22  Yes Xavier Schroeder PA-C   phentermine (ADIPEX-P) 37.5 MG tablet Take 37.5 mg by mouth every morning (before breakfast) 11/18/22  Yes June Willard PA-C   polyethylene glycol (MIRALAX) 17 g packet Take 17 g by mouth daily 11/30/22  Yes Xavier Schroeder PA-C   polyvinyl alcohol (LIQUIFILM TEARS) 1.4 % ophthalmic solution Place 1 drop into both eyes 3 times daily as needed for dry eyes   Yes Unknown, Entered By History   pravastatin (PRAVACHOL) 80 MG tablet Take 80 mg by mouth At Bedtime   Yes Unknown, Entered By History   Semaglutide, 2 MG/DOSE, (OZEMPIC, 2 MG/DOSE,) 8 MG/3ML SOPN Inject 2 mg Subcutaneous once a week 11/18/22  Yes June Willard,  FRANCISCA   senna-docusate (SENOKOT-S/PERICOLACE) 8.6-50 MG tablet Take 1-2 tablets by mouth 2 times daily Take while on oral narcotics to prevent or treat constipation. 11/30/22  Yes Xavier Schroeder PA-C   venlafaxine (EFFEXOR-XR) 150 MG 24 hr capsule Take 150 mg by mouth daily   Yes Unknown, Entered By History   Vibegron (GEMTESA) 75 MG TABS tablet Take 75 mg by mouth daily   Yes Unknown, Entered By History   vitamin D2 (ERGOCALCIFEROL) 68766 units (1250 mcg) capsule Take 50,000 Units by mouth four times a week MWFSA   Yes Reported, Patient   ixekizumab (TALTZ) 80 MG/ML SOAJ auto-injector Inject 1 mL (80 mg) Subcutaneous every 28 days 11/18/22   June Willard PA-C          Social History  Reviewed, and she  reports that she quit smoking about 4 years ago. Her smoking use included cigarettes. She smoked an average of .5 packs per day. She has never used smokeless tobacco. She reports that she does not currently use alcohol. She reports that she does not use drugs.      Family History- Reviewed care everywhere to find family history- Nothing relevant to pain consult    Reviewed, and family history includes Cancer in her father; No Known Problems in her mother.    Review of Systems  Complete ROS reviewed, unless noted  , all other systems reviewed (with patient) and all others found to be negative.         Objective:     Vitals:  B/P: 139/89, T: 97.8, P: 80, R: 17     Weight:   252 lbs 9.6 oz  Body mass index is 44.75 kg/m .      Physical Exam:     General Appearance:  Alert, cooperative, no distress, appears stated age  Patient is sitting up in bed   Head:  Normocephalic, without obvious abnormality, atraumatic   Eyes:  Pupils are reactive    ENT/Throat: Lips dry  Teeth missing    Lymph/Neck: Supple, symmetrical, trachea midline, no adenopathy, thyroid: not enlarged, symmetric    Lungs:     respirations unlabored   Chest Wall:  No tenderness or deformity   Cardiovascular/Heart:  No SOB      Abdomen:    Soft, non-tender, bowel sounds active all four quadrants,  no masses, no organomegaly   Musculoskeletal: Left knee covered with ace wrap    Skin: Skin color pale   Neurologic: Alert and oriented X 3, Moves all 4 extremities       Psych: Affect is pleasant   Grooming is borderline              Labs Reviewed Personally By Myself    Sodium   Date Value Ref Range Status   12/01/2022 139 136 - 145 mmol/L Final   04/15/2021 140 133 - 144 mmol/L Final     Potassium   Date Value Ref Range Status   12/01/2022 4.0 3.5 - 5.0 mmol/L Final   05/04/2021 3.7 3.4 - 5.3 mmol/L Final     Chloride   Date Value Ref Range Status   12/01/2022 103 98 - 107 mmol/L Final   04/15/2021 108 94 - 109 mmol/L Final     Carbon Dioxide   Date Value Ref Range Status   04/15/2021 24 20 - 32 mmol/L Final     Carbon Dioxide (CO2)   Date Value Ref Range Status   12/01/2022 25 22 - 31 mmol/L Final     Anion Gap   Date Value Ref Range Status   12/01/2022 11 5 - 18 mmol/L Final   04/15/2021 8 3 - 14 mmol/L Final     Glucose   Date Value Ref Range Status   12/01/2022 173 (H) 70 - 125 mg/dL Final   04/15/2021 108 (H) 70 - 99 mg/dL Final     Urea Nitrogen   Date Value Ref Range Status   12/01/2022 11 8 - 22 mg/dL Final   04/15/2021 20 7 - 30 mg/dL Final     Creatinine   Date Value Ref Range Status   12/01/2022 0.67 0.60 - 1.10 mg/dL Final   05/04/2021 0.62 0.52 - 1.04 mg/dL Final     GFR Estimate   Date Value Ref Range Status   12/01/2022 >90 >60 mL/min/1.73m2 Final     Comment:     Effective December 21, 2021 eGFRcr in adults is calculated using the 2021 CKD-EPI creatinine equation which includes age and gender (Andrei manley al., NEJM, DOI: 10.1056/VUFCqt9630329)   05/04/2021 >90 >60 mL/min/[1.73_m2] Final     Comment:     Non  GFR Calc  Starting 12/18/2018, serum creatinine based estimated GFR (eGFR) will be   calculated using the Chronic Kidney Disease Epidemiology Collaboration   (CKD-EPI) equation.       Calcium   Date Value Ref Range Status    12/01/2022 8.9 8.5 - 10.5 mg/dL Final   04/15/2021 8.4 (L) 8.5 - 10.1 mg/dL Final             Total time spent 82 minutes with greater than 50% in consultation, education and coordination of care.     Also discussed with RNs, PA. I did reviewed medication plan with PA.  Reviewed labs and history of CKD 1.  Also reviewed hemoglobin.  It appears that we can safely give 1 dose of ketorolac.  Check magnesium level which appears to be 1.8.  We will also bolus with magnesium IV now.  Also reviewed outside prescription monitoring program database.  Reviewed pain clinic history as well as H&P.  Met with patient at the bedside and discussed with 3 nurses.    Thank you for this consultation.          Connie SAMUEL, CNS-BC, CNP, ACHPN  Acute Care Pain Management Program   Hours of pain coverage 7a-1700- after 1700 please call the house officer   [facility] (WW, Joes, JNs)   Page via [facility]- Click HERE to page Connie or call [phone]

## 2022-12-01 NOTE — PLAN OF CARE
Discharge plan reviewed with patient, verbalized understanding. All personal belongings with patient. Copy of AVS given to patient. IV removed. Patient discharged to home with son providing transportation.     Problem: Pain Acute  Goal: Optimal Pain Control and Function  Intervention: Prevent or Manage Pain  Recent Flowsheet Documentation  Taken 12/1/2022 0900 by Britt Morgan  Medication Review/Management: medications reviewed     Problem: Pain Chronic (Persistent)  Goal: Optimal Pain Control and Function  Outcome: Adequate for Care Transition   Goal Outcome Evaluation:

## 2022-12-01 NOTE — DISCHARGE SUMMARY
[facility] Discharge Summary    Swathi Gibbons MRN# [id number]   Age: 57 year old YOB: 1965     Date of Admission:  11/30/2022  Date of Discharge::  12/1/2022  Admitting Physician:  Deandre Correa MD  Discharge Physician:  Kaur Blanton PA-C          Admission Diagnoses:   Osteoarthritis of left knee [M17.12]  S/P total knee arthroplasty, left [Z96.652]          Discharge Diagnosis:   Patient Active Problem List    Diagnosis     NABOR (obstructive sleep apnea)     Hyperlipidemia     Difficulty walking     S/P lumbar fusion     BPPV (benign paroxysmal positional vertigo), unspecified laterality     Morbid obesity (H)     Left ureteral stone     Nephrolithiasis     S/P reverse total shoulder arthroplasty, left     Benign essential tremor     Overactive bladder     Chronic rhinitis     Psoriasis     Secondary hyperparathyroidism (H)     MRSA (methicillin resistant staph aureus) culture positive     Esophageal dysmotility     Chronic, continuous use of opioids     Diabetes mellitus without complication (H)     Chronic low back pain     Fibromyalgia     Spondylolisthesis, acquired     Osteoarthritis of both knees     Alcohol use disorder, severe, in sustained remission (H)     Hypothyroidism (acquired)     Latent tuberculosis     Essential hypertension     Psoriatic arthritis (H)     Nodular goiter     Vitamin D deficiency     Major depression     History of tobacco use disorder     Mild persistent asthma     Anxiety disorder     History of peptic ulcer             Procedures:   Procedure(s): Total knee arthoplasty (Left)       No other procedures performed during this admission           Medications Prior to Admission:     Medications Prior to Admission   Medication Sig Dispense Refill Last Dose     albuterol (PROAIR HFA/PROVENTIL HFA/VENTOLIN HFA) 108 (90 Base) MCG/ACT inhaler Inhale 2 puffs into the lungs every 4 hours as needed for shortness of breath / dyspnea or wheezing (VENTOLIN)    Past Week at has with     buPROPion (WELLBUTRIN XL) 300 MG 24 hr tablet Take 300 mg by mouth every morning    11/30/2022     calcitonin, salmon, (MIACALCIN) 200 UNIT/ACT nasal spray Spray 1 spray into one nostril alternating nostrils daily Alternate nostril each day.   11/29/2022 at has with     calcium carbonate-vitamin D (OSCAL) 500-5 MG-MCG tablet Take 1 tablet by mouth 2 times daily   11/29/2022     cycloSPORINE (RESTASIS) 0.05 % ophthalmic emulsion Place 1 drop into both eyes 2 times daily   11/29/2022 at not with     diltiazem (CARDIZEM) 60 MG tablet Take 60 mg by mouth 3 times daily   11/30/2022 at am x 1     doxepin (SINEQUAN) 25 MG capsule Take 25-50 mg by mouth nightly as needed for sleep   prn     ferrous sulfate (FEROSUL) 325 (65 Fe) MG tablet Take 325 mg by mouth daily (with breakfast)   11/29/2022     fish oil-omega-3 fatty acids 1000 MG capsule Take 2 g by mouth 2 times daily Stopped 11/21/22 before surgery.   11/21/2022     fluticasone (FLONASE) 50 MCG/ACT nasal spray Spray 2 sprays into both nostrils daily    11/29/2022 at has with     fluticasone-salmeterol (ADVAIR) 100-50 MCG/DOSE inhaler Inhale 1 puff into the lungs every 12 hours   11/29/2022 at has with     gabapentin (NEURONTIN) 300 MG capsule Take 900 mg by mouth 3 times daily (300MG X 3 = 900MG)   11/30/2022 at am x 1     leflunomide (ARAVA) 20 MG tablet Take 20 mg by mouth every evening Stopped 11/16/22 before surgery.   11/16/2022     levothyroxine (SYNTHROID/LEVOTHROID) 50 MCG tablet Take 50 mcg by mouth daily   11/30/2022     liothyronine (CYTOMEL) 5 MCG tablet Take 5 mcg by mouth 2 times daily   11/29/2022     lisinopril (ZESTRIL) 5 MG tablet Take 1 tablet (5 mg) by mouth daily   11/29/2022     lurasidone (LATUDA) 20 MG TABS tablet Take 20 mg by mouth At Bedtime    11/29/2022     methylPREDNISolone (MEDROL DOSEPAK) 4 MG tablet therapy pack Take 4 mg by mouth daily Takes 4 mg daily 21 tablet 0 11/30/2022     multivitamin, therapeutic  (THERA-VIT) TABS tablet Take 1 tablet by mouth daily Stopped 11/21/22 before surgery.   11/21/2022     naloxone (NARCAN) 4 MG/0.1ML nasal spray Spray 4 mg into one nostril alternating nostrils once as needed for opioid reversal every 2-3 minutes until assistance arrives        nystatin (MYCOSTATIN) 025494 UNIT/GM external powder Apply topically 4 times daily as needed Under abdominal wall    prn     omeprazole (PRILOSEC) 20 MG DR capsule Take 20 mg by mouth 2 times daily    11/29/2022     phentermine (ADIPEX-P) 37.5 MG tablet Take 37.5 mg by mouth every morning (before breakfast) 90 tablet 0 11/21/2022     polyvinyl alcohol (LIQUIFILM TEARS) 1.4 % ophthalmic solution Place 1 drop into both eyes 3 times daily as needed for dry eyes   prn at not with     pravastatin (PRAVACHOL) 80 MG tablet Take 80 mg by mouth At Bedtime   11/29/2022     Semaglutide, 2 MG/DOSE, (OZEMPIC, 2 MG/DOSE,) 8 MG/3ML SOPN Inject 2 mg Subcutaneous once a week 24 mL 1 11/30/2022     venlafaxine (EFFEXOR-XR) 150 MG 24 hr capsule Take 150 mg by mouth daily   11/30/2022     Vibegron (GEMTESA) 75 MG TABS tablet Take 75 mg by mouth daily   11/29/2022     vitamin D2 (ERGOCALCIFEROL) 42521 units (1250 mcg) capsule Take 50,000 Units by mouth four times a week MWFSA   11/28/2022     ixekizumab (TALTZ) 80 MG/ML SOAJ auto-injector Inject 1 mL (80 mg) Subcutaneous every 28 days        [DISCONTINUED] acetaminophen (TYLENOL) 325 MG tablet Take 325-650 mg by mouth every 4 hours as needed for mild pain   Past Week     [DISCONTINUED] aspirin (ASA) 81 MG chewable tablet Take 81 mg by mouth daily   11/14/2022     [DISCONTINUED] fentaNYL 37.5 MCG/HR 72 hr patch Place 1 patch onto the skin every 72 hours   11/30/2022     [DISCONTINUED] HYDROmorphone (DILAUDID) 4 MG tablet Take 4 mg by mouth every 6 hours as needed for severe pain (7-10)   11/29/2022             Discharge Medications:     Current Discharge Medication List      START taking these medications     Details   aspirin 81 MG EC tablet Take 1 tablet (81 mg) by mouth 2 times daily  Qty: 60 tablet, Refills: 0    Associated Diagnoses: S/P total knee arthroplasty, left      bisacodyl (DULCOLAX) 10 MG suppository Place 1 suppository (10 mg) rectally daily Discontinue if having bowel movements.  Qty: 12 suppository, Refills: 0    Associated Diagnoses: S/P total knee arthroplasty, left      polyethylene glycol (MIRALAX) 17 g packet Take 17 g by mouth daily  Qty: 7 packet, Refills: 0    Associated Diagnoses: S/P total knee arthroplasty, left      senna-docusate (SENOKOT-S/PERICOLACE) 8.6-50 MG tablet Take 1-2 tablets by mouth 2 times daily Take while on oral narcotics to prevent or treat constipation.  Qty: 30 tablet, Refills: 0    Comments: While taking narcotics  Associated Diagnoses: S/P total knee arthroplasty, left     FentaNYL 37.5 MCG/HR 72 hr patch apply 1 patch every 72 hours to skin        CONTINUE these medications which have CHANGED    Details   acetaminophen (TYLENOL) 325 MG tablet Take 2 tablets (650 mg) by mouth every 4 hours as needed for other (mild pain)  Qty: 100 tablet, Refills: 0    Associated Diagnoses: S/P total knee arthroplasty, left      HYDROmorphone (DILAUDID) 4 MG tablet Take 1 tablet (4 mg) by mouth every 3 hours as needed for severe pain (7-10)  Qty: 20 tablet, Refills: 0    Associated Diagnoses: S/P total knee arthroplasty, left         CONTINUE these medications which have NOT CHANGED    Details   albuterol (PROAIR HFA/PROVENTIL HFA/VENTOLIN HFA) 108 (90 Base) MCG/ACT inhaler Inhale 2 puffs into the lungs every 4 hours as needed for shortness of breath / dyspnea or wheezing (VENTOLIN)      buPROPion (WELLBUTRIN XL) 300 MG 24 hr tablet Take 300 mg by mouth every morning       calcitonin, salmon, (MIACALCIN) 200 UNIT/ACT nasal spray Spray 1 spray into one nostril alternating nostrils daily Alternate nostril each day.      calcium carbonate-vitamin D (OSCAL) 500-5 MG-MCG tablet Take 1 tablet  by mouth 2 times daily      cycloSPORINE (RESTASIS) 0.05 % ophthalmic emulsion Place 1 drop into both eyes 2 times daily      diltiazem (CARDIZEM) 60 MG tablet Take 60 mg by mouth 3 times daily      doxepin (SINEQUAN) 25 MG capsule Take 25-50 mg by mouth nightly as needed for sleep      ferrous sulfate (FEROSUL) 325 (65 Fe) MG tablet Take 325 mg by mouth daily (with breakfast)      fish oil-omega-3 fatty acids 1000 MG capsule Take 2 g by mouth 2 times daily Stopped 11/21/22 before surgery.      fluticasone (FLONASE) 50 MCG/ACT nasal spray Spray 2 sprays into both nostrils daily       fluticasone-salmeterol (ADVAIR) 100-50 MCG/DOSE inhaler Inhale 1 puff into the lungs every 12 hours      gabapentin (NEURONTIN) 300 MG capsule Take 900 mg by mouth 3 times daily (300MG X 3 = 900MG)      leflunomide (ARAVA) 20 MG tablet Take 20 mg by mouth every evening Stopped 11/16/22 before surgery.      levothyroxine (SYNTHROID/LEVOTHROID) 50 MCG tablet Take 50 mcg by mouth daily      liothyronine (CYTOMEL) 5 MCG tablet Take 5 mcg by mouth 2 times daily      lisinopril (ZESTRIL) 5 MG tablet Take 1 tablet (5 mg) by mouth daily      lurasidone (LATUDA) 20 MG TABS tablet Take 20 mg by mouth At Bedtime       methylPREDNISolone (MEDROL DOSEPAK) 4 MG tablet therapy pack Take 4 mg by mouth daily Takes 4 mg daily  Qty: 21 tablet, Refills: 0      multivitamin, therapeutic (THERA-VIT) TABS tablet Take 1 tablet by mouth daily Stopped 11/21/22 before surgery.      naloxone (NARCAN) 4 MG/0.1ML nasal spray Spray 4 mg into one nostril alternating nostrils once as needed for opioid reversal every 2-3 minutes until assistance arrives      nystatin (MYCOSTATIN) 421417 UNIT/GM external powder Apply topically 4 times daily as needed Under abdominal wall       omeprazole (PRILOSEC) 20 MG DR capsule Take 20 mg by mouth 2 times daily       phentermine (ADIPEX-P) 37.5 MG tablet Take 37.5 mg by mouth every morning (before breakfast)  Qty: 90 tablet, Refills:  0      polyvinyl alcohol (LIQUIFILM TEARS) 1.4 % ophthalmic solution Place 1 drop into both eyes 3 times daily as needed for dry eyes      pravastatin (PRAVACHOL) 80 MG tablet Take 80 mg by mouth At Bedtime      Semaglutide, 2 MG/DOSE, (OZEMPIC, 2 MG/DOSE,) 8 MG/3ML SOPN Inject 2 mg Subcutaneous once a week  Qty: 24 mL, Refills: 1      venlafaxine (EFFEXOR-XR) 150 MG 24 hr capsule Take 150 mg by mouth daily      Vibegron (GEMTESA) 75 MG TABS tablet Take 75 mg by mouth daily      vitamin D2 (ERGOCALCIFEROL) 25984 units (1250 mcg) capsule Take 50,000 Units by mouth four times a week MWFSA      ixekizumab (TALTZ) 80 MG/ML SOAJ auto-injector Inject 1 mL (80 mg) Subcutaneous every 28 days         STOP taking these medications       aspirin (ASA) 81 MG chewable tablet Comments:   Reason for Stopping:                              Consultations:   Consultation during this admission received from internal medicine and pain mgt          Brief History of Illness:   Reason for admission requiring a surgical or invasive procedure:   Patient Active Problem List    Diagnosis     NABOR (obstructive sleep apnea)     Hyperlipidemia     Difficulty walking     S/P lumbar fusion     BPPV (benign paroxysmal positional vertigo), unspecified laterality     Morbid obesity (H)     Left ureteral stone     Nephrolithiasis     S/P reverse total shoulder arthroplasty, left     Benign essential tremor     Overactive bladder     Chronic rhinitis     Psoriasis     Secondary hyperparathyroidism (H)     MRSA (methicillin resistant staph aureus) culture positive     Esophageal dysmotility     Chronic, continuous use of opioids     Diabetes mellitus without complication (H)     Chronic low back pain     Fibromyalgia     Spondylolisthesis, acquired     Osteoarthritis of both knees     Alcohol use disorder, severe, in sustained remission (H)     Hypothyroidism (acquired)     Latent tuberculosis     Essential hypertension     Psoriatic arthritis (H)      Nodular goiter     Vitamin D deficiency     Major depression     History of tobacco use disorder     Mild persistent asthma     Anxiety disorder     History of peptic ulcer      The patient underwent the following procedure(s):   Total knee arthoplasty (Left)   There were no immediate complications during this procedure.    Please refer to the full operative summary for details.           Hospital Course:   The patient's hospital course was unremarkable.  She recovered as anticipated and experienced no post-operative complications.           Discharge Instructions and Follow-Up:   Discharge diet: Regular   Discharge activity: Activity as tolerated  Keep leg elevated as much as possible  Increase activity as tolerated  Up as tolerated  Devices: walker, shower chair  Driving restricitIons: no driving while taking narcotic analgesics   Discharge follow-up: Follow up with Dr. Correa in 2 weeks   Wound care: Dressing changes: Dressing to remain in place until 2 week post-op  Ice to area for comfort  Keep wound clean and dry. Cover when showering.  Do not submerge in hot tub, bath tub, pool           Discharge Disposition:   Discharged to home      Attestation:  Amount of time performed on this discharge summary: 30 minutes.    Kaur Blanton PA-C

## 2022-12-01 NOTE — ANESTHESIA POSTPROCEDURE EVALUATION
Patient: Swathi Gibbons    Procedure: Procedure(s):  LEFT TOTAL KNEE ARTHROPLASTY       Anesthesia Type:  Spinal    Note:  Disposition: Inpatient   Postop Pain Control: Uneventful            Sign Out: Well controlled pain   PONV: No   Neuro/Psych: Uneventful            Sign Out: Acceptable/Baseline neuro status   Airway/Respiratory: Uneventful            Sign Out: Acceptable/Baseline resp. status   CV/Hemodynamics: Uneventful            Sign Out: Acceptable CV status; No obvious hypovolemia; No obvious fluid overload   Other NRE: NONE   DID A NON-ROUTINE EVENT OCCUR? No           Last vitals:  Vitals Value Taken Time   /67 11/30/22 1800   Temp 36.7  C (98  F) 11/30/22 1800   Pulse 91 11/30/22 1822   Resp 15 11/30/22 1800   SpO2 94 % 11/30/22 1822   Vitals shown include unvalidated device data.    Electronically Signed By: Shon Serrano MD  November 30, 2022  7:40 PM

## 2022-12-01 NOTE — PROGRESS NOTES
"[facility] Progress Note    POD #: 1  S/P: Procedure(s):  LEFT TOTAL KNEE ARTHROPLASTY    Subjective:  Patient reports 4-5/10 pain today. Has been utilizing ice packs. Undergoing PT, states walking went really well. Feels overall well today. Pt denies CP, SOB, lightheadedness, dizziness.  (-) N/V  (+) flatus    Objective:  /89 (BP Location: Left arm)   Pulse 80   Temp 97.8  F (36.6  C) (Oral)   Resp 17   Ht 1.6 m (5' 3\")   Wt 114.6 kg (252 lb 9.6 oz)   SpO2 92%   BMI 44.75 kg/m      Intake/Output Summary (Last 24 hours) at 12/1/2022 1138  Last data filed at 12/1/2022 0900  Gross per 24 hour   Intake 1680 ml   Output 1400 ml   Net 280 ml     Physical Examination:  Incision: Mepilex dressing in place at left anterior knee. Notable for mild swelling. C/D/I.  The patient is A&Ox3. Appears comfortable, sitting up at bedside.  Sensation is intact to light touch in bilateral L2-S1 dermatomes.  Foot/Ankle Movement intact.   5/5 strength with ankle DF, PF and EHL bilaterally.  Brisk capillary refill in toes.   Palpable dorsalis pedis pulses bilaterally.  Calves are soft and non-tender.    Assessment:  S/P Procedure(s):  LEFT TOTAL KNEE ARTHROPLASTY    Plan:  -Internal Med following. Appreciate their recs.  -Continue with pain management   -Diet per protocol  -PT/OT  -Weight bearing status: WBAT  -DVT prophylaxis: aspirin 81mg BID (OR home anticoagulant) per surgical team  -Per pain mgt: will discharge with dilaudid 4mg Q3H prn for pain  -Pt will follow-up with Dr. Correa in 2 weeks  -Pt states she has shower chair and walker with wheels at home.    Pt plans to discharge home today.    Kaur Blanton PA-C  [facility]   [phone]   December 1, 2022 at 11:38 AM      "

## 2022-12-01 NOTE — PROGRESS NOTES
[facility] MEDICINE PROGRESS NOTE      Identification/Summary: Swathi Gibbons is a 57 year old female with a past medical history of mild persistent asthma, former tobacco user, hypertension, DM2, hypothyroidism, dyslipidemia, peptic ulcer disease, GERD, psoriasis, psoriatic arthritis , on immunosuppressants and chronic steroid, superficial thrombophlebitis, depression, anxiety, underwent total knee arthroplasty. POD 1.Left knee pain is stable.    Assessment and Plan:  Mild persistent asthma  Stable and asymptomatic  Albuterol inhaler as needed  Advair 100-50 mcg 1 puff twice a day    Essential hypertension  Blood pressure is stable  Diltiazem 60 mg 3 times a day  Lisinopril 5 mg daily    DM2  Ozempic 2 mg once a week  Status post Lantus 15 unit once while on IV hydrocortisone  Blood glucose numbers are stable    Dyslipidemia  Atorvastatin 80 mg daily    Hypothyroidism  Levothyroxine 50 mcg daily  Liothyronine 5 mcg twice a day     GERD  Peptic ulcer disease  Resume PPI     Psoriatic arthritis  Chronic immunosuppression  On Arava 20 mg daily, on hold  Ixekizumab infusion every 4 weeks  Methylprednisone 4 mg daily  IV hydrocortisone 60 mg every 6 hours x 4 doses for prevention of adrenal insufficiency     Depression and anxiety  Effexor  mg daily  Latuda 20 mg daily  Wellbutrin  mg daily     Morbid obesity  Modification of lifestyle for weight loss     Chronic back pain  On Dilaudid 4 mg every 6 hours as needed  Gabapentin 900 mg 3 times a day     Obstructive sleep apnea  Compliant with CPAP use    Morbid obesity Body mass index is 44.75 kg/m .   Modification of lifestyle for weight loss    S/p left total knee arthroplasty  Resume routine postoperative  Care and Occupational Therapy  Use incentive spirometry frequently  DVT prophylaxis per orthopedics, aspirin 81 mg twice a day  Pain control with Tylenol 975 mg every 8 hours, 650 mg every 4 hours as needed, p.o. Dilaudid 4 mg  "3 times a day and 4 mg every 3 hours as needed  Appreciate acute pain team  Hemoglobin is stable at 11.7      Jennifer Lozada MD  [facility]  Phone: #[phone]    Interval History/Subjective:  Resting comfortably.  Left knee pain is stable.  No new concern.  Denies headache, chest pain, breathing difficulty, palpitation, nausea, vomiting, abdominal pain or urinary symptoms.    Physical Exam/Objective:  Temp:  [97.1  F (36.2  C)-98.6  F (37  C)] 97.8  F (36.6  C)  Pulse:  [74-94] 80  Resp:  [10-27] 17  BP: (113-175)/(62-91) 139/89  SpO2:  [89 %-100 %] 92 %  Body mass index is 44.75 kg/m .     GENERAL:  Alert, appears comfortable, in no acute distress, appears stated age, obese   HEAD:  Normocephalic, without obvious abnormality, atraumatic   EYES:  PERRL, conjunctiva  clear,   EOM's intact   NOSE: Nares normal,  mucosa normal, no drainage   THROAT: Lips, mucosa,  gums normal, mouth moist   NECK: Supple, symmetrical, trachea midline   BACK:   Symmetric, no curvature, ROM normal   LUNGS:   Clear to auscultation bilaterally, no rales, rhonchi, or wheezing, symmetric chest rise on inhalation, respirations unlabored   CHEST WALL:  No tenderness or deformity   HEART:  Regular rate and rhythm, S1 and S2 normal, no murmur    ABDOMEN:   Soft, non-tender, bowel sounds active , no masses, no organomegaly, no rebound or guarding   EXTREMITIES: Status post left knee arthroplasty   SKIN: No rashes   NEURO: Alert, oriented x3    PSYCH: Cooperative, behavior is appropriate      Data reviewed today: I personally reviewed all new medications, labs, imaging/diagnostics reports over the past 24 hours. Pertinent findings include:    Imaging:   Recent Results (from the past 24 hour(s))   POC US Guidance Needle Placement    Narrative    Ultrasound was performed as guidance to an anesthesia procedure.  Click   \"PACS images\" hyperlink below to view any stored images.  For specific "   procedure details, view procedure note authored by anesthesia.   XR Knee Port Left 1/2 Views    Narrative    EXAM: XR KNEE PORT LEFT 1/2 VIEWS  LOCATION: [facility]  DATE/TIME: 11/30/2022 4:57 PM    INDICATION: Post op total knee.  COMPARISON: Radiographs from 09/21/2016.      Impression    IMPRESSION: Recent left total knee arthroplasty with adjacent gas. Excellent position and alignment of components. There is no evidence of complication or periprosthetic fracture. Calcified intra-articular body in the popliteus sheath incidentally noted.       Labs:  Most Recent 3 CBC's:Recent Labs   Lab Test 12/01/22  0458 11/20/21  0349 05/04/21  1004 04/15/21  0703   WBC 11.7* 8.4  --  4.1   HGB 11.7 10.8* 10.6* 9.1*   MCV 97 96  --  89    214 235 119*     Most Recent 3 BMP's:Recent Labs   Lab Test 12/01/22  0731 12/01/22  0458 11/30/22  2150 11/30/22  1336 11/20/21  0349 05/04/21  1004 04/15/21  0703   NA  --  139  --   --  138  --  140   POTASSIUM  --  4.0  --   --  4.1 3.7 4.2   CHLORIDE  --  103  --   --  104  --  108   CO2  --  25  --   --  32  --  24   BUN  --  11  --   --  11  --  20   CR  --  0.67  --   --  0.56 0.62 0.89   ANIONGAP  --  11  --   --  2*  --  8   EVELIA  --  8.9  --   --  8.7  --  8.4*   * 173* 186*   < > 92  --  108*    < > = values in this interval not displayed.       Medications:   Personally Reviewed.  Medications     lactated ringers         acetaminophen  975 mg Oral Q8H     aspirin  81 mg Oral BID     buPROPion  300 mg Oral QAM     calcium carbonate-vitamin D  1 tablet Oral BID     cycloSPORINE  1 drop Both Eyes BID     diltiazem  60 mg Oral TID     fluticasone-salmeterol  1 puff Inhalation Q12H     gabapentin  900 mg Oral TID     hydrocortisone sodium succinate PF  60 mg Intravenous Q6H     HYDROmorphone  4 mg Oral TID     insulin aspart  1-7 Units Subcutaneous TID AC     levothyroxine  50 mcg Oral Daily     liothyronine  5 mcg Oral BID     lisinopril  5  mg Oral Daily     lurasidone  20 mg Oral At Bedtime     methylPREDNISolone  4 mg Oral Daily     pantoprazole  40 mg Oral BID AC     polyethylene glycol  17 g Oral Daily     povidone-iodine 0.28% (BETADINE) in sterile water 500 mL irrigation   Irrigation Once     pravastatin  80 mg Oral At Bedtime     senna-docusate  1 tablet Oral BID     sodium chloride (PF)  3 mL Intracatheter Q8H     venlafaxine  150 mg Oral Daily

## 2022-12-01 NOTE — CONSULTS
"ACUPUNCTURIST TREATMENT NOTE    Name: Swathi Gibbons  :  1965  MRN:  [id number]    Acupuncture Treatment  Patient Type: Orthopedic  Intervention Reason: Headache  Pre-session Headache Ratin  Post-session Headache Ratin  Patient complaint:: headache  Initial insertions: (R) GB 34, (R) GB 40, GB 21, (L) LI 4, (R) Eveline 3, (R) Sp 9, Adalberto Lewis, Du 20, Yin Mariee         \"Risks and benefits of acupuncture were discussed with patient. Consent for treatment was given. We thank you for the referral.\"     Anju Escobedo L.Ac.     Date:  2022  Time:  11:30 AM    "

## 2022-12-01 NOTE — PROGRESS NOTES
12/01/22 0930   Appointment Info   Signing Clinician's Name / Credentials (OT) Rudy Santoyo OTR/L   Quick Adds   Quick Adds Certification   Living Environment   People in Home child(tammy), adult  (Son will stay with her for a few weeks.)   Current Living Arrangements apartment   Living Environment Comments Tub/shower with trsfs bench, RTS with [employer] GB   Self-Care   Usual Activity Tolerance good   Current Activity Tolerance moderate   Equipment Currently Used at Home cane, straight;walker, rolling   Instrumental Activities of Daily Living (IADL)   Previous Responsibilities meal prep;housekeeping;laundry;shopping;driving   General Information   Onset of Illness/Injury or Date of Surgery 11/30/22   Referring Physician Dr. Deandre Correa   Patient/Family Therapy Goal Statement (OT) To return home today   Additional Occupational Profile Info/Pertinent History of Current Problem PMH:  hyperlip., HTN, Asthma, NABOR, T2DM, Obesity, chronic  pain, Psoriatic Arthritis.   Left Lower Extremity (Weight-bearing Status) weight-bearing as tolerated (WBAT)   Cognitive Status Examination   Orientation Status orientation to person, place and time   Follows Commands WNL   Visual Perception   Visual Impairment/Limitations corrective lenses for reading  (HA at home.)   Bed Mobility   Bed Mobility supine-sit;sit-supine   Supine-Sit [place] (Bed Mobility) supervision;verbal cues   Sit-Supine [place] (Bed Mobility) supervision;verbal cues   Assistive Device (Bed Mobility) bed rails  (has bed rail at  home.)   Transfers   Transfers bed-chair transfer;sit-stand transfer;toilet transfer   Transfer Skill: Bed to Chair/Chair to Bed   Bed-Chair [place] (Transfers) supervision;verbal cues   Assistive Device (Bed-Chair Transfers) rolling walker   Sit-Stand Transfer   Sit-Stand [place] (Transfers) supervision;verbal cues   Assistive Device (Sit-Stand Transfers) walker, 4-wheeled   Toilet Transfer   Type (Toilet Transfer)  sit-stand;stand-sit   [place] Level (Toilet Transfer) supervision;verbal cues   Assistive Device (Toilet Transfer) walker, 4-wheeled   Balance   Balance Assessment standing balance: dynamic   Balance Comments good   Activities of Daily Living   BADL Assessment/Intervention upper body dressing;lower body dressing;toileting   Upper Body Dressing Assessment/Training   Position (Upper Body Dressing) supported sitting   [place] Level (Upper Body Dressing) independent   Lower Body Dressing Assessment/Training   Position (Lower Body Dressing) supported sitting;supported standing   Assistive Devices (Lower Body Dressing) reacher;sock-aid   [place] Level (Lower Body Dressing) supervision;verbal cues   Toileting   Position (Toileting) supported sitting;supported standing   Assistive Devices (Toileting) raised toilet seat;grab bar, toilet   [place] Level (Toileting) supervision;verbal cues   Clinical Impression   Criteria for Skilled Therapeutic Interventions Met (OT) Yes, treatment indicated   OT Diagnosis Decreased indep with ADLs due to TKA   OT Problem List-Impairments impacting ADL problems related to;mobility   Assessment of Occupational Performance 3-5 Performance Deficits   Identified Performance Deficits dressing, toileting, bathing,  G/H, trsfs   Planned Therapy Interventions (OT) ADL retraining   Clinical Decision Making Complexity (OT) moderate complexity   Risk & Benefits of therapy have been explained evaluation/treatment results reviewed;participants included;patient   OT Total Evaluation Time   OT Eval, Moderate Complexity Minutes (53830) 15   Therapy Certification   Medical Diagnosis TKA   Start of Care Date 12/01/22   Certification date from 12/01/22   Certification date to 01/01/23   OT Goals   Therapy Frequency (OT) Daily   OT Predicted Duration/Target Date for Goal Attainment 12/01/22   OT Goals Lower Body Dressing;Transfers   OT: Lower Body Dressing Modified independent;Completed   OT:  Transfer Modified independent;Completed   Interventions   Interventions Quick Adds Self-Care/Home Management   Self-Care/Home Management   Self-Care/Home Mgmt/ADL, Compensatory, Meal Prep Minutes (38638) 30   Symptoms Noted During/After Treatment (Meal Preparation/Planning Training) none   Treatment Detail/Skilled Intervention Pt up in chair when therapist arrived.  Initially, Pt needed SBA for VC  for hand placement and correct technique for room trsfs.  By end of session, Pt was mod indep using her 4WW for chair, bed, toilet with RTS and GB and tub/shower using trsf bench (ther demo - Pt understood).  Therapist also review car trsf and kitchen mobility.  Pt understood correct technique to use at home.  At end of session, Pt back in bed with bed alarm on and ice applied to her L knee.  Call light in hand.  RN aware.   [place] Level (Grooming Training) independent   Assistance (Grooming Training) supervision;verbal cues  (VC to hold vanity for support.)   [place] Level (Upper Body Dressing Training) independent   Lower Body Dressing Training Assistance independent   Lower Body Dressing Training Assistance supervision;verbal cues  (VC for using the reacher and sock aid.)   Lower Body Dressing Training Assistance - Assistive Device reacher;sock-aid   [place] Level (Toilet Training) independent   Assistance (Toilet Training) supervision;verbal cues  (for hand placement)   Toilet Training Assistance - Assistive Device commode overlay   OT Discharge Planning   OT Plan D/C OT   OT Discharge Recommendation (DC Rec) (S)  home with assist   OT Rationale for DC Rec Pt will  have assist from her Son upon D/C home.   OT Brief overview of current status All OT goals met.  Mod indep using her 4WW   Total Session Time   Timed Code Treatment Minutes 30   Total Session Time (sum of timed and untimed services) 45    [facility]  OUTPATIENT OCCUPATIONAL THERAPY  EVALUATION  PLAN OF  TREATMENT FOR OUTPATIENT REHABILITATION  (COMPLETE FOR INITIAL CLAIMS ONLY)  Patient's Last Name, First Name, M.I.  YOB: 1965  Swathi Gibbons                          Provider's Name  [facility] Medical Record No.  [id number]                             Onset Date:  11/30/22   Start of Care Date:  12/01/22   Type:     ___PT   _X_OT   ___SLP Medical Diagnosis:  TKA                    OT Diagnosis:  Decreased indep with ADLs due to TKA Visits from SOC:  1     See note for plan of treatment, functional goals and certification details    I CERTIFY THE NEED FOR THESE SERVICES FURNISHED UNDER        THIS PLAN OF TREATMENT AND WHILE UNDER MY CARE     (Physician co-signature of this document indicates review and certification of the therapy plan).

## 2022-12-01 NOTE — PROGRESS NOTES
12/01/22 0815   Appointment Info   Signing Clinician's Name / Credentials (PT) Donavan Jones, PT, DPT   Quick Adds   Quick Adds Certification   Living Environment   People in Home child(tammy), adult   Current Living Arrangements apartment   Home Accessibility no concerns   Living Environment Comments Son coming to stay for first couple weeks after d/c   Self-Care   Usual Activity Tolerance good   Current Activity Tolerance moderate   Equipment Currently Used at Home cane, straight;walker, rolling   Fall history within last six months no   General Information   Onset of Illness/Injury or Date of Surgery 11/30/22   Referring Physician Dr. Correa   Patient/Family Therapy Goals Statement (PT) Decrease pain with mobility   Pertinent History of Current Problem (include personal factors and/or comorbidities that impact the POC) s/p TKA   Existing Precautions/Restrictions weight bearing   Weight-Bearing Status - LLE weight-bearing as tolerated   Weight-Bearing Status - RLE full weight-bearing   Range of Motion (ROM)   ROM Comment WFL, decreased LLE s/p TKA   Strength (Manual Muscle Testing)   Strength Comments WFL   Bed Mobility   Bed Mobility supine-sit   Supine-Sit [place] (Bed Mobility) modified independence   Transfers   Transfers sit-stand transfer   Sit-Stand Transfer   Sit-Stand [place] (Transfers) contact guard   Assistive Device (Sit-Stand Transfers) walker, 4-wheeled   Gait/Stairs (Locomotion)   [place] Level (Gait) contact guard   Assistive Device (Gait) walker, 4-wheeled   Distance in Feet (Required for LE Total Joints) 10'   Distance in Feet (Gait) 120'   Pattern (Gait) step-through   Deviations/Abnormal Patterns (Gait) antalgic;jay decreased;gait speed decreased;weight shifting decreased   Clinical Impression   Criteria for Skilled Therapeutic Intervention Yes, treatment indicated   PT Diagnosis (PT) Impaired functional mobility   Influenced by the following impairments Weakness, pain    Functional limitations due to impairments Transfers, gait   Clinical Presentation (PT Evaluation Complexity) Stable/Uncomplicated   Clinical Presentation Rationale Pt presents as medically diagnosed   Clinical Decision Making (Complexity) low complexity   Planned Therapy Interventions (PT) gait training;home exercise program;patient/family education;ROM (range of motion);strengthening;transfer training   Anticipated Equipment Needs at Discharge (PT) walker, rolling   Risk & Benefits of therapy have been explained care plan/treatment goals reviewed;patient   PT Total Evaluation Time   PT Eval, Low Complexity Minutes (51385) 5   Plan of Care Review   Plan of Care Reviewed With patient   Therapy Certification   Start of care date 12/01/22   Certification date from 12/01/22   Certification date to 01/01/23   Medical Diagnosis s/p TKA   Physical Therapy Goals   PT Frequency One time eval and treatment only   PT Predicted Duration/Target Date for Goal Attainment 12/01/22   PT Goals Transfers;Gait;PT Goal 1   PT: Transfers Supervision/stand-by assist;Sit to/from stand;Goal Met   PT: Gait Supervision/stand-by assist;Rolling walker;150 feet;Goal Met   PT: Goal 1 Independent with HEP for TKA rehab following initial cueing and education, Goal Met   Interventions   Interventions Quick Adds Gait Training;Therapeutic Activity;Therapeutic Procedure   Therapeutic Procedure/Exercise   Ther. Procedure: strength, endurance, ROM, flexibillity Minutes (32801) 10   Symptoms Noted During/After Treatment increased pain   Treatment Detail/Skilled Intervention Completed TKA HEP including LAQ, knee flex/ext stretch, heel slides, SLR, hip ABD/ADD, AP, QS, and GS after initial education and cueing.   Therapeutic Activity   Therapeutic Activities: dynamic activities to improve functional performance Minutes (69504) 7   Symptoms Noted During/After Treatment Increased pain   Treatment Detail/Skilled Intervention Supine to sit Mod I with HOB  elevated, increased time for set up and completion of exs, sit<.stand CGA using 4WW to lean on. No concerns with transfers.   Gait Training   Gait Training Minutes (94690) 8   Symptoms Noted During/After Treatment (Gait Training) increased pain   Treatment Detail/Skilled Intervention Pt amb moderately well in the halls with 4WW CGA, no LOB or adverse s/s, educated on walking program upon d/c. Pt moving slowly but safely, no stairs at home. Cues for posture, WB, and knee ROM with amb.   [place] Level (Gait Training) contact guard   Physical Assistance Level (Gait Training) supervision;verbal cues   Weight Bearing (Gait Training) weight-bearing as tolerated   Assistive Device (Gait Training) rolling walker   Pattern Analysis (Gait Training) swing-through gait   Gait Analysis Deviations decreased jay;decreased step length;decreased weight-shifting ability   PT Discharge Planning   PT Plan D/C PT   PT Discharge Recommendation (DC Rec) (S)  home with assist;home with outpatient physical therapy   PT Rationale for DC Rec Son will be staying w/ pt for first couple weeks to assist as needed, OP PT has been set up, ambulating well and does not have to do stairs, no mobility concerns with d/c at this time.   PT Brief overview of current status Ax1 for safety, amb 120' with 4WW, son to stay w/ pt   Total Session Time   Timed Code Treatment Minutes 25   Total Session Time (sum of timed and untimed services) 30   [facility]  OUTPATIENT PHYSICAL THERAPY EVALUATION  PLAN OF TREATMENT FOR OUTPATIENT REHABILITATION  (COMPLETE FOR INITIAL CLAIMS ONLY)  Patient's Last Name, First Name, M.I.  YOB: 1965  Swathi Gibbons                        Provider's Name  [facility] Medical Record No.  [id number]                             Onset Date:  11/30/22   Start of Care Date:  12/01/22   Type:     _X_PT   ___OT   ___SLP Medical Diagnosis:  s/p TKA               PT Diagnosis:  Impaired functional mobility Visits from SOC:  1     See note for plan of treatment, functional goals and certification details    I CERTIFY THE NEED FOR THESE SERVICES FURNISHED UNDER        THIS PLAN OF TREATMENT AND WHILE UNDER MY CARE     (Physician co-signature of this document indicates review and certification of the therapy plan).

## 2023-04-22 ENCOUNTER — HEALTH MAINTENANCE LETTER (OUTPATIENT)
Age: 58
End: 2023-04-22

## 2023-07-15 ENCOUNTER — HEALTH MAINTENANCE LETTER (OUTPATIENT)
Age: 58
End: 2023-07-15

## 2023-09-23 ENCOUNTER — HEALTH MAINTENANCE LETTER (OUTPATIENT)
Age: 58
End: 2023-09-23

## 2023-12-02 ENCOUNTER — HEALTH MAINTENANCE LETTER (OUTPATIENT)
Age: 58
End: 2023-12-02

## 2024-02-10 ENCOUNTER — HEALTH MAINTENANCE LETTER (OUTPATIENT)
Age: 59
End: 2024-02-10

## 2024-06-23 ENCOUNTER — HEALTH MAINTENANCE LETTER (OUTPATIENT)
Age: 59
End: 2024-06-23

## 2024-09-01 ENCOUNTER — HEALTH MAINTENANCE LETTER (OUTPATIENT)
Age: 59
End: 2024-09-01

## 2024-11-10 ENCOUNTER — HEALTH MAINTENANCE LETTER (OUTPATIENT)
Age: 59
End: 2024-11-10

## 2025-03-02 ENCOUNTER — HEALTH MAINTENANCE LETTER (OUTPATIENT)
Age: 60
End: 2025-03-02

## 2025-06-21 ENCOUNTER — HEALTH MAINTENANCE LETTER (OUTPATIENT)
Age: 60
End: 2025-06-21

## (undated) DEVICE — DECANTER VIAL 2006S

## (undated) DEVICE — DRAPE STERI TOWEL LG 1010

## (undated) DEVICE — DRAPE IOBAN INCISE 23X17" 6650EZ

## (undated) DEVICE — GUIDEWIRE SENSOR DUAL FLEX STR 0.035"X150CM M0066703080

## (undated) DEVICE — DRAPE C-ARM 60X42" 1013

## (undated) DEVICE — GLOVE BIOGEL INDICATOR 7.5 LF 41675

## (undated) DEVICE — STPL SKIN 35W 6.9MM  PXW35

## (undated) DEVICE — MANIFOLD NEPTUNE 4 PORT 700-20

## (undated) DEVICE — DRAPE C-ARM W/STRAPS 42X72" 07-CA104

## (undated) DEVICE — SU VICRYL 2-0 CT-2 27" UND J269H

## (undated) DEVICE — BLADE KNIFE SURG 10 371110

## (undated) DEVICE — BASKET STONE EXTRACTOR NITINOL NCIRCLE 2.2FRX115CM G18788

## (undated) DEVICE — SOL NACL 0.9% IRRIG 3000ML BAG 2B7477

## (undated) DEVICE — SYR 07ML EPIDURAL LOSS OF RESISTANCE PULSATOR 4905

## (undated) DEVICE — SU VICRYL 3-0 SH 27" UND J416H

## (undated) DEVICE — PACK SPINE SM CUSTOM SNE15SSFSK

## (undated) DEVICE — SOL NACL 0.9% IRRIG 1000ML BOTTLE 2F7124

## (undated) DEVICE — SU STRATAFIX PDS PLUS 1 CT-1 18" SXPP1A404

## (undated) DEVICE — BASKET STONE EXTRACTOR NITINOL NCIRCLE 1.5FRX115CM G46206

## (undated) DEVICE — SUCTION MANIFOLD NEPTUNE 2 SYS 4 PORT 0702-020-000

## (undated) DEVICE — GLOVE UNDER INDICATOR PI SZ 7.0 LF 41670

## (undated) DEVICE — GLOVE UNDER INDICATOR PI SZ 8.5 LF 41685

## (undated) DEVICE — LINEN TOWEL PACK X5 5464

## (undated) DEVICE — BLADE SAW SAGITTAL STRK DUAL CUT 4118-135-090

## (undated) DEVICE — SPECIMEN CONTAINER 5OZ STERILE 2600SA

## (undated) DEVICE — LINEN GOWN X4 5410

## (undated) DEVICE — SU MONOCRYL 3-0 PS-2 18" UND MCP497G

## (undated) DEVICE — SOL NACL 0.9% INJ 1000ML BAG 2B1324X

## (undated) DEVICE — STRAP KNEE/BODY 31143004

## (undated) DEVICE — CATH URETERAL OPEN END 5FRX70CM M0064002010

## (undated) DEVICE — NDL ACCESSORY KIT 14GA 6" CVD TIP 3550-43

## (undated) DEVICE — SU MONOCRYL 3-0 PS-2 27" Y427H

## (undated) DEVICE — SHEATH URETERAL ACCESS NAVIGATOR HD 12/14FRX36CM M0062502250

## (undated) DEVICE — PAD CHUX UNDERPAD 30X36" P3036C

## (undated) DEVICE — GLOVE PROTEXIS W/NEU-THERA 7.5  2D73TE75

## (undated) DEVICE — CUSTOM PACK TOTAL KNEE SOP5BTKHEC

## (undated) DEVICE — SUTURE VICRYL+ 2-0 27IN CT-1 UND VCP259H

## (undated) DEVICE — BANDAGE ELASTIC 6X550 LF DBL 593-96LF

## (undated) DEVICE — CAST PADDING 6" STERILE 9046S

## (undated) DEVICE — DRESSING MEPILEX AG SILVER 4X12 395990

## (undated) DEVICE — GLOVE BIOGEL PI SZ 8.5 40885

## (undated) DEVICE — HOOD SURG T7 PLUS STRL LF DISP 0416-801-200

## (undated) DEVICE — ESU GROUND PAD ADULT W/CORD E7507

## (undated) DEVICE — CUFF TOURN 34IN STRL DISP

## (undated) DEVICE — CATH FOLEY 16FR 5ML LUBRICATH LATEX 0165L16

## (undated) DEVICE — PLATE GROUNDING ADULT W/CORD 9165L

## (undated) DEVICE — TUBING SET THERMEDX UROLOGY SGL USE LL0006

## (undated) DEVICE — SOL WATER IRRIG 1000ML BOTTLE 2F7114

## (undated) DEVICE — SUTURE VICRYL+ 1 27IN CT-1 UND VCP261H

## (undated) DEVICE — SU SILK 0 SH 30" K834H

## (undated) DEVICE — Device

## (undated) DEVICE — STIMULATOR CHARGER NEUROSTIM INTELLIS 97755

## (undated) DEVICE — SYR 30ML LL W/O NDL 302832

## (undated) DEVICE — LINEN POUCH DBL 5427

## (undated) DEVICE — DRSG STERI STRIP 1/2X4" R1547

## (undated) DEVICE — DRSG TEGADERM 6X8" 1628

## (undated) DEVICE — GOWN IMPERVIOUS BREATHABLE SMART LG 89015

## (undated) DEVICE — WIRELESS EXTERNAL NEUROSTIMULATOR

## (undated) DEVICE — GUIDEWIRE AMPLATZ SUPER STIFF .035 X 145CM M0066401080

## (undated) DEVICE — CONTROLLER

## (undated) DEVICE — GLOVE BIOGEL PI SZ 6.5 40865

## (undated) DEVICE — CUSTOM PACK TOTAL KNEE ACCESSORY SOP5BTAHEA

## (undated) DEVICE — GLOVE SURG PI ULTRA TOUCH M SZ 7-1/2 LF

## (undated) DEVICE — SU VICRYL 0 CT-2 27" J334H

## (undated) DEVICE — HOLDER LIMB VELCRO OR 0814-1533

## (undated) RX ORDER — FENTANYL CITRATE-0.9 % NACL/PF 10 MCG/ML
PLASTIC BAG, INJECTION (ML) INTRAVENOUS
Status: DISPENSED
Start: 2021-05-04

## (undated) RX ORDER — FENTANYL CITRATE 50 UG/ML
INJECTION, SOLUTION INTRAMUSCULAR; INTRAVENOUS
Status: DISPENSED
Start: 2021-04-13

## (undated) RX ORDER — FENTANYL CITRATE 50 UG/ML
INJECTION, SOLUTION INTRAMUSCULAR; INTRAVENOUS
Status: DISPENSED
Start: 2019-05-09

## (undated) RX ORDER — LIDOCAINE HYDROCHLORIDE 20 MG/ML
JELLY TOPICAL
Status: DISPENSED
Start: 2021-05-13

## (undated) RX ORDER — ONDANSETRON 2 MG/ML
INJECTION INTRAMUSCULAR; INTRAVENOUS
Status: DISPENSED
Start: 2019-04-04

## (undated) RX ORDER — FENTANYL CITRATE 50 UG/ML
INJECTION, SOLUTION INTRAMUSCULAR; INTRAVENOUS
Status: DISPENSED
Start: 2021-05-04

## (undated) RX ORDER — LIDOCAINE HYDROCHLORIDE 20 MG/ML
INJECTION, SOLUTION EPIDURAL; INFILTRATION; INTRACAUDAL; PERINEURAL
Status: DISPENSED
Start: 2021-05-04

## (undated) RX ORDER — PROPOFOL 10 MG/ML
INJECTION, EMULSION INTRAVENOUS
Status: DISPENSED
Start: 2021-05-04

## (undated) RX ORDER — FENTANYL CITRATE 50 UG/ML
INJECTION, SOLUTION INTRAMUSCULAR; INTRAVENOUS
Status: DISPENSED
Start: 2019-04-04

## (undated) RX ORDER — KETAMINE HCL IN NACL, ISO-OSM 100MG/10ML
SYRINGE (ML) INJECTION
Status: DISPENSED
Start: 2019-05-09

## (undated) RX ORDER — OXYCODONE HYDROCHLORIDE 5 MG/1
TABLET ORAL
Status: DISPENSED
Start: 2021-05-04

## (undated) RX ORDER — DEXAMETHASONE SODIUM PHOSPHATE 4 MG/ML
INJECTION, SOLUTION INTRA-ARTICULAR; INTRALESIONAL; INTRAMUSCULAR; INTRAVENOUS; SOFT TISSUE
Status: DISPENSED
Start: 2019-04-04

## (undated) RX ORDER — EPHEDRINE SULFATE 50 MG/ML
INJECTION, SOLUTION INTRAMUSCULAR; INTRAVENOUS; SUBCUTANEOUS
Status: DISPENSED
Start: 2021-04-13

## (undated) RX ORDER — DEXAMETHASONE SODIUM PHOSPHATE 4 MG/ML
INJECTION, SOLUTION INTRA-ARTICULAR; INTRALESIONAL; INTRAMUSCULAR; INTRAVENOUS; SOFT TISSUE
Status: DISPENSED
Start: 2019-05-09

## (undated) RX ORDER — LIDOCAINE HYDROCHLORIDE 20 MG/ML
INJECTION, SOLUTION EPIDURAL; INFILTRATION; INTRACAUDAL; PERINEURAL
Status: DISPENSED
Start: 2019-05-09

## (undated) RX ORDER — FENTANYL CITRATE-0.9 % NACL/PF 10 MCG/ML
PLASTIC BAG, INJECTION (ML) INTRAVENOUS
Status: DISPENSED
Start: 2021-04-13

## (undated) RX ORDER — FENTANYL CITRATE 50 UG/ML
INJECTION, SOLUTION INTRAMUSCULAR; INTRAVENOUS
Status: DISPENSED
Start: 2022-11-30

## (undated) RX ORDER — PROPOFOL 10 MG/ML
INJECTION, EMULSION INTRAVENOUS
Status: DISPENSED
Start: 2019-05-09

## (undated) RX ORDER — PROPOFOL 10 MG/ML
INJECTION, EMULSION INTRAVENOUS
Status: DISPENSED
Start: 2019-04-04

## (undated) RX ORDER — CLINDAMYCIN PHOSPHATE 900 MG/50ML
INJECTION, SOLUTION INTRAVENOUS
Status: DISPENSED
Start: 2019-04-04

## (undated) RX ORDER — CLINDAMYCIN PHOSPHATE 900 MG/50ML
INJECTION, SOLUTION INTRAVENOUS
Status: DISPENSED
Start: 2021-05-04

## (undated) RX ORDER — LIDOCAINE HYDROCHLORIDE 20 MG/ML
INJECTION, SOLUTION EPIDURAL; INFILTRATION; INTRACAUDAL; PERINEURAL
Status: DISPENSED
Start: 2019-04-04

## (undated) RX ORDER — LIDOCAINE HYDROCHLORIDE 20 MG/ML
JELLY TOPICAL
Status: DISPENSED
Start: 2021-04-13

## (undated) RX ORDER — ONDANSETRON 2 MG/ML
INJECTION INTRAMUSCULAR; INTRAVENOUS
Status: DISPENSED
Start: 2019-05-09

## (undated) RX ORDER — ONDANSETRON 2 MG/ML
INJECTION INTRAMUSCULAR; INTRAVENOUS
Status: DISPENSED
Start: 2021-05-04